# Patient Record
Sex: MALE | Race: WHITE | NOT HISPANIC OR LATINO | Employment: FULL TIME | URBAN - METROPOLITAN AREA
[De-identification: names, ages, dates, MRNs, and addresses within clinical notes are randomized per-mention and may not be internally consistent; named-entity substitution may affect disease eponyms.]

---

## 2018-03-29 ENCOUNTER — COMMUNICATION - HEALTHEAST (OUTPATIENT)
Dept: TELEHEALTH | Facility: CLINIC | Age: 48
End: 2018-03-29

## 2018-03-29 ENCOUNTER — COMMUNICATION - HEALTHEAST (OUTPATIENT)
Dept: FAMILY MEDICINE | Facility: CLINIC | Age: 48
End: 2018-03-29

## 2018-03-29 ENCOUNTER — OFFICE VISIT - HEALTHEAST (OUTPATIENT)
Dept: FAMILY MEDICINE | Facility: CLINIC | Age: 48
End: 2018-03-29

## 2018-03-29 DIAGNOSIS — Z00.00 ROUTINE GENERAL MEDICAL EXAMINATION AT A HEALTH CARE FACILITY: ICD-10-CM

## 2018-03-29 DIAGNOSIS — Z23 IMMUNIZATION DUE: ICD-10-CM

## 2018-03-29 DIAGNOSIS — L30.9 ECZEMA: ICD-10-CM

## 2018-03-29 DIAGNOSIS — E66.9 OBESITY: ICD-10-CM

## 2018-03-29 DIAGNOSIS — E11.9 TYPE 2 DIABETES MELLITUS WITHOUT COMPLICATION (H): ICD-10-CM

## 2018-03-29 DIAGNOSIS — I10 HYPERTENSION GOAL BP (BLOOD PRESSURE) < 140/90: ICD-10-CM

## 2018-03-29 DIAGNOSIS — F43.23 ADJUSTMENT DISORDER WITH MIXED ANXIETY AND DEPRESSED MOOD: ICD-10-CM

## 2018-03-29 DIAGNOSIS — E78.5 HYPERLIPIDEMIA: ICD-10-CM

## 2018-03-29 DIAGNOSIS — J20.9 ACUTE BRONCHITIS: ICD-10-CM

## 2018-03-29 LAB
ALBUMIN SERPL-MCNC: 3.9 G/DL (ref 3.5–5)
ALP SERPL-CCNC: 86 U/L (ref 45–120)
ALT SERPL W P-5'-P-CCNC: 65 U/L (ref 0–45)
ANION GAP SERPL CALCULATED.3IONS-SCNC: 11 MMOL/L (ref 5–18)
AST SERPL W P-5'-P-CCNC: 37 U/L (ref 0–40)
BILIRUB SERPL-MCNC: 0.7 MG/DL (ref 0–1)
BUN SERPL-MCNC: 14 MG/DL (ref 8–22)
CALCIUM SERPL-MCNC: 9.9 MG/DL (ref 8.5–10.5)
CHLORIDE BLD-SCNC: 102 MMOL/L (ref 98–107)
CO2 SERPL-SCNC: 25 MMOL/L (ref 22–31)
CREAT SERPL-MCNC: 0.69 MG/DL (ref 0.7–1.3)
CREAT UR-MCNC: 67.1 MG/DL
GFR SERPL CREATININE-BSD FRML MDRD: >60 ML/MIN/1.73M2
GLUCOSE BLD-MCNC: 112 MG/DL (ref 70–125)
HBA1C MFR BLD: 7 % (ref 3.5–6)
LDLC SERPL CALC-MCNC: 83 MG/DL
MICROALBUMIN UR-MCNC: 1.09 MG/DL (ref 0–1.99)
MICROALBUMIN/CREAT UR: 16.2 MG/G
POTASSIUM BLD-SCNC: 4.4 MMOL/L (ref 3.5–5)
PROT SERPL-MCNC: 8.3 G/DL (ref 6–8)
SODIUM SERPL-SCNC: 138 MMOL/L (ref 136–145)

## 2018-03-29 ASSESSMENT — MIFFLIN-ST. JEOR: SCORE: 1904.78

## 2018-04-27 ENCOUNTER — OFFICE VISIT - HEALTHEAST (OUTPATIENT)
Dept: BEHAVIORAL HEALTH | Facility: CLINIC | Age: 48
End: 2018-04-27

## 2018-04-27 DIAGNOSIS — F33.0 MILD EPISODE OF RECURRENT MAJOR DEPRESSIVE DISORDER (H): ICD-10-CM

## 2018-04-27 DIAGNOSIS — F10.20 MODERATE ALCOHOL USE DISORDER (H): ICD-10-CM

## 2018-04-27 DIAGNOSIS — F41.1 GENERALIZED ANXIETY DISORDER: ICD-10-CM

## 2018-04-27 DIAGNOSIS — Z63.4 BEREAVEMENT, UNCOMPLICATED: ICD-10-CM

## 2018-04-27 SDOH — SOCIAL STABILITY - SOCIAL INSECURITY: DISSAPEARANCE AND DEATH OF FAMILY MEMBER: Z63.4

## 2018-05-17 ENCOUNTER — AMBULATORY - HEALTHEAST (OUTPATIENT)
Dept: BEHAVIORAL HEALTH | Facility: CLINIC | Age: 48
End: 2018-05-17

## 2018-05-17 ENCOUNTER — OFFICE VISIT - HEALTHEAST (OUTPATIENT)
Dept: BEHAVIORAL HEALTH | Facility: CLINIC | Age: 48
End: 2018-05-17

## 2018-05-17 ENCOUNTER — COMMUNICATION - HEALTHEAST (OUTPATIENT)
Dept: BEHAVIORAL HEALTH | Facility: CLINIC | Age: 48
End: 2018-05-17

## 2018-05-17 DIAGNOSIS — F10.20 MODERATE ALCOHOL USE DISORDER (H): ICD-10-CM

## 2018-05-17 DIAGNOSIS — Z63.4 BEREAVEMENT, UNCOMPLICATED: ICD-10-CM

## 2018-05-17 DIAGNOSIS — F33.0 MILD EPISODE OF RECURRENT MAJOR DEPRESSIVE DISORDER (H): ICD-10-CM

## 2018-05-17 DIAGNOSIS — F41.1 GENERALIZED ANXIETY DISORDER: ICD-10-CM

## 2018-05-17 SDOH — SOCIAL STABILITY - SOCIAL INSECURITY: DISSAPEARANCE AND DEATH OF FAMILY MEMBER: Z63.4

## 2018-05-29 ENCOUNTER — AMBULATORY - HEALTHEAST (OUTPATIENT)
Dept: EDUCATION SERVICES | Facility: CLINIC | Age: 48
End: 2018-05-29

## 2018-05-29 DIAGNOSIS — E11.9 TYPE 2 DIABETES MELLITUS WITHOUT COMPLICATION (H): ICD-10-CM

## 2018-05-31 ENCOUNTER — OFFICE VISIT - HEALTHEAST (OUTPATIENT)
Dept: BEHAVIORAL HEALTH | Facility: CLINIC | Age: 48
End: 2018-05-31

## 2018-05-31 DIAGNOSIS — F41.1 GENERALIZED ANXIETY DISORDER: ICD-10-CM

## 2018-05-31 DIAGNOSIS — F33.0 MILD EPISODE OF RECURRENT MAJOR DEPRESSIVE DISORDER (H): ICD-10-CM

## 2018-05-31 DIAGNOSIS — Z63.4 BEREAVEMENT, UNCOMPLICATED: ICD-10-CM

## 2018-05-31 DIAGNOSIS — F10.20 MODERATE ALCOHOL USE DISORDER (H): ICD-10-CM

## 2018-05-31 SDOH — SOCIAL STABILITY - SOCIAL INSECURITY: DISSAPEARANCE AND DEATH OF FAMILY MEMBER: Z63.4

## 2018-06-18 ENCOUNTER — OFFICE VISIT - HEALTHEAST (OUTPATIENT)
Dept: BEHAVIORAL HEALTH | Facility: CLINIC | Age: 48
End: 2018-06-18

## 2018-06-18 DIAGNOSIS — Z63.4 BEREAVEMENT, UNCOMPLICATED: ICD-10-CM

## 2018-06-18 DIAGNOSIS — F10.20 MODERATE ALCOHOL USE DISORDER (H): ICD-10-CM

## 2018-06-18 DIAGNOSIS — F41.1 GENERALIZED ANXIETY DISORDER: ICD-10-CM

## 2018-06-18 DIAGNOSIS — F33.0 MILD EPISODE OF RECURRENT MAJOR DEPRESSIVE DISORDER (H): ICD-10-CM

## 2018-06-18 SDOH — SOCIAL STABILITY - SOCIAL INSECURITY: DISSAPEARANCE AND DEATH OF FAMILY MEMBER: Z63.4

## 2018-07-09 ENCOUNTER — OFFICE VISIT - HEALTHEAST (OUTPATIENT)
Dept: BEHAVIORAL HEALTH | Facility: CLINIC | Age: 48
End: 2018-07-09

## 2018-07-09 DIAGNOSIS — F33.0 MILD EPISODE OF RECURRENT MAJOR DEPRESSIVE DISORDER (H): ICD-10-CM

## 2018-07-09 DIAGNOSIS — F41.1 GENERALIZED ANXIETY DISORDER: ICD-10-CM

## 2018-07-23 ENCOUNTER — COMMUNICATION - HEALTHEAST (OUTPATIENT)
Dept: FAMILY MEDICINE | Facility: CLINIC | Age: 48
End: 2018-07-23

## 2018-07-23 ENCOUNTER — OFFICE VISIT - HEALTHEAST (OUTPATIENT)
Dept: BEHAVIORAL HEALTH | Facility: CLINIC | Age: 48
End: 2018-07-23

## 2018-07-23 DIAGNOSIS — F41.1 GENERALIZED ANXIETY DISORDER: ICD-10-CM

## 2018-07-23 DIAGNOSIS — F43.23 ADJUSTMENT DISORDER WITH MIXED ANXIETY AND DEPRESSED MOOD: ICD-10-CM

## 2018-07-23 DIAGNOSIS — I10 HYPERTENSION GOAL BP (BLOOD PRESSURE) < 140/90: ICD-10-CM

## 2018-07-23 DIAGNOSIS — E78.5 HYPERLIPIDEMIA: ICD-10-CM

## 2018-07-23 DIAGNOSIS — F33.0 MILD EPISODE OF RECURRENT MAJOR DEPRESSIVE DISORDER (H): ICD-10-CM

## 2018-08-01 ENCOUNTER — AMBULATORY - HEALTHEAST (OUTPATIENT)
Dept: EDUCATION SERVICES | Facility: CLINIC | Age: 48
End: 2018-08-01

## 2018-08-01 DIAGNOSIS — E08.65 DIABETES MELLITUS DUE TO UNDERLYING CONDITION WITH HYPERGLYCEMIA (H): ICD-10-CM

## 2018-08-01 LAB — HBA1C MFR BLD: 7.2 % (ref 3.5–6)

## 2018-08-14 ENCOUNTER — AMBULATORY - HEALTHEAST (OUTPATIENT)
Dept: BEHAVIORAL HEALTH | Facility: CLINIC | Age: 48
End: 2018-08-14

## 2018-08-14 ENCOUNTER — OFFICE VISIT - HEALTHEAST (OUTPATIENT)
Dept: BEHAVIORAL HEALTH | Facility: CLINIC | Age: 48
End: 2018-08-14

## 2018-08-14 DIAGNOSIS — F33.0 MILD EPISODE OF RECURRENT MAJOR DEPRESSIVE DISORDER (H): ICD-10-CM

## 2018-08-14 DIAGNOSIS — F41.1 GENERALIZED ANXIETY DISORDER: ICD-10-CM

## 2018-08-22 ENCOUNTER — COMMUNICATION - HEALTHEAST (OUTPATIENT)
Dept: BEHAVIORAL HEALTH | Facility: CLINIC | Age: 48
End: 2018-08-22

## 2019-04-02 ENCOUNTER — COMMUNICATION - HEALTHEAST (OUTPATIENT)
Dept: FAMILY MEDICINE | Facility: CLINIC | Age: 49
End: 2019-04-02

## 2019-04-02 ENCOUNTER — OFFICE VISIT - HEALTHEAST (OUTPATIENT)
Dept: FAMILY MEDICINE | Facility: CLINIC | Age: 49
End: 2019-04-02

## 2019-04-02 DIAGNOSIS — I10 HYPERTENSION GOAL BP (BLOOD PRESSURE) < 140/90: ICD-10-CM

## 2019-04-02 DIAGNOSIS — R05.9 COUGH: ICD-10-CM

## 2019-04-02 DIAGNOSIS — R07.0 THROAT PAIN: ICD-10-CM

## 2019-04-02 DIAGNOSIS — J18.9 PNEUMONIA OF LEFT LOWER LOBE DUE TO INFECTIOUS ORGANISM: ICD-10-CM

## 2019-04-02 LAB — DEPRECATED S PYO AG THROAT QL EIA: NORMAL

## 2019-04-03 LAB — GROUP A STREP BY PCR: NORMAL

## 2019-04-04 ENCOUNTER — OFFICE VISIT - HEALTHEAST (OUTPATIENT)
Dept: FAMILY MEDICINE | Facility: CLINIC | Age: 49
End: 2019-04-04

## 2019-04-04 DIAGNOSIS — I10 ESSENTIAL HYPERTENSION, BENIGN: ICD-10-CM

## 2019-04-04 DIAGNOSIS — E11.9 TYPE 2 DIABETES MELLITUS WITHOUT COMPLICATION, WITHOUT LONG-TERM CURRENT USE OF INSULIN (H): ICD-10-CM

## 2019-04-04 DIAGNOSIS — E78.00 PURE HYPERCHOLESTEROLEMIA: ICD-10-CM

## 2019-04-04 DIAGNOSIS — J18.9 PNEUMONIA OF LEFT LOWER LOBE DUE TO INFECTIOUS ORGANISM: ICD-10-CM

## 2019-04-04 LAB — HBA1C MFR BLD: 6.7 % (ref 3.5–6)

## 2019-04-05 ENCOUNTER — COMMUNICATION - HEALTHEAST (OUTPATIENT)
Dept: FAMILY MEDICINE | Facility: CLINIC | Age: 49
End: 2019-04-05

## 2019-04-05 LAB
ALBUMIN SERPL-MCNC: 3.7 G/DL (ref 3.5–5)
ALP SERPL-CCNC: 74 U/L (ref 45–120)
ALT SERPL W P-5'-P-CCNC: 49 U/L (ref 0–45)
ANION GAP SERPL CALCULATED.3IONS-SCNC: 10 MMOL/L (ref 5–18)
AST SERPL W P-5'-P-CCNC: 30 U/L (ref 0–40)
BILIRUB SERPL-MCNC: 0.4 MG/DL (ref 0–1)
BUN SERPL-MCNC: 14 MG/DL (ref 8–22)
CALCIUM SERPL-MCNC: 9.9 MG/DL (ref 8.5–10.5)
CHLORIDE BLD-SCNC: 100 MMOL/L (ref 98–107)
CO2 SERPL-SCNC: 28 MMOL/L (ref 22–31)
CREAT SERPL-MCNC: 0.78 MG/DL (ref 0.7–1.3)
GFR SERPL CREATININE-BSD FRML MDRD: >60 ML/MIN/1.73M2
GLUCOSE BLD-MCNC: 189 MG/DL (ref 70–125)
LDLC SERPL CALC-MCNC: 77 MG/DL
POTASSIUM BLD-SCNC: 4 MMOL/L (ref 3.5–5)
PROT SERPL-MCNC: 7.6 G/DL (ref 6–8)
SODIUM SERPL-SCNC: 138 MMOL/L (ref 136–145)

## 2019-04-17 ENCOUNTER — COMMUNICATION - HEALTHEAST (OUTPATIENT)
Dept: FAMILY MEDICINE | Facility: CLINIC | Age: 49
End: 2019-04-17

## 2019-04-17 DIAGNOSIS — I10 HYPERTENSION GOAL BP (BLOOD PRESSURE) < 140/90: ICD-10-CM

## 2019-04-17 DIAGNOSIS — E78.5 HYPERLIPIDEMIA: ICD-10-CM

## 2019-05-18 ENCOUNTER — COMMUNICATION - HEALTHEAST (OUTPATIENT)
Dept: FAMILY MEDICINE | Facility: CLINIC | Age: 49
End: 2019-05-18

## 2019-05-18 DIAGNOSIS — E11.9 TYPE 2 DIABETES MELLITUS WITHOUT COMPLICATION (H): ICD-10-CM

## 2019-07-02 ENCOUNTER — COMMUNICATION - HEALTHEAST (OUTPATIENT)
Dept: FAMILY MEDICINE | Facility: CLINIC | Age: 49
End: 2019-07-02

## 2019-07-02 DIAGNOSIS — I10 HYPERTENSION GOAL BP (BLOOD PRESSURE) < 140/90: ICD-10-CM

## 2019-10-16 ENCOUNTER — OFFICE VISIT - HEALTHEAST (OUTPATIENT)
Dept: FAMILY MEDICINE | Facility: CLINIC | Age: 49
End: 2019-10-16

## 2019-10-16 DIAGNOSIS — G44.009 VASOMOTOR HEADACHE: ICD-10-CM

## 2019-10-16 DIAGNOSIS — I10 ESSENTIAL HYPERTENSION, BENIGN: ICD-10-CM

## 2019-10-16 DIAGNOSIS — E11.9 TYPE 2 DIABETES MELLITUS WITHOUT COMPLICATION, WITHOUT LONG-TERM CURRENT USE OF INSULIN (H): ICD-10-CM

## 2019-10-16 DIAGNOSIS — Z00.00 ROUTINE GENERAL MEDICAL EXAMINATION AT A HEALTH CARE FACILITY: ICD-10-CM

## 2019-10-16 DIAGNOSIS — E78.00 PURE HYPERCHOLESTEROLEMIA: ICD-10-CM

## 2019-10-16 DIAGNOSIS — R94.5 ABNORMAL RESULTS OF LIVER FUNCTION STUDIES: ICD-10-CM

## 2019-10-16 DIAGNOSIS — F43.23 ADJUSTMENT DISORDER WITH MIXED ANXIETY AND DEPRESSED MOOD: ICD-10-CM

## 2019-10-16 DIAGNOSIS — F41.1 GENERALIZED ANXIETY DISORDER: ICD-10-CM

## 2019-10-16 LAB
ALT SERPL W P-5'-P-CCNC: 52 U/L (ref 0–45)
CREAT UR-MCNC: 113.6 MG/DL
HBA1C MFR BLD: 6.8 % (ref 3.5–6)
MICROALBUMIN UR-MCNC: 1.39 MG/DL (ref 0–1.99)
MICROALBUMIN/CREAT UR: 12.2 MG/G

## 2019-10-16 ASSESSMENT — MIFFLIN-ST. JEOR: SCORE: 1901.95

## 2019-10-16 ASSESSMENT — PATIENT HEALTH QUESTIONNAIRE - PHQ9: SUM OF ALL RESPONSES TO PHQ QUESTIONS 1-9: 6

## 2019-10-17 ENCOUNTER — COMMUNICATION - HEALTHEAST (OUTPATIENT)
Dept: FAMILY MEDICINE | Facility: CLINIC | Age: 49
End: 2019-10-17

## 2019-10-18 ENCOUNTER — AMBULATORY - HEALTHEAST (OUTPATIENT)
Dept: OTHER | Facility: CLINIC | Age: 49
End: 2019-10-18

## 2019-10-18 ENCOUNTER — DOCUMENTATION ONLY (OUTPATIENT)
Dept: OTHER | Facility: CLINIC | Age: 49
End: 2019-10-18

## 2019-10-24 ENCOUNTER — COMMUNICATION - HEALTHEAST (OUTPATIENT)
Dept: FAMILY MEDICINE | Facility: CLINIC | Age: 49
End: 2019-10-24

## 2019-11-06 ENCOUNTER — RECORDS - HEALTHEAST (OUTPATIENT)
Dept: ADMINISTRATIVE | Facility: OTHER | Age: 49
End: 2019-11-06

## 2019-11-07 ENCOUNTER — COMMUNICATION - HEALTHEAST (OUTPATIENT)
Dept: FAMILY MEDICINE | Facility: CLINIC | Age: 49
End: 2019-11-07

## 2019-11-07 DIAGNOSIS — G44.009 VASOMOTOR HEADACHE: ICD-10-CM

## 2019-11-11 ENCOUNTER — RECORDS - HEALTHEAST (OUTPATIENT)
Dept: HEALTH INFORMATION MANAGEMENT | Facility: CLINIC | Age: 49
End: 2019-11-11

## 2020-03-13 ENCOUNTER — VIRTUAL VISIT (OUTPATIENT)
Dept: FAMILY MEDICINE | Facility: OTHER | Age: 50
End: 2020-03-13

## 2020-03-15 ENCOUNTER — OFFICE VISIT - HEALTHEAST (OUTPATIENT)
Dept: FAMILY MEDICINE | Facility: CLINIC | Age: 50
End: 2020-03-15

## 2020-03-15 DIAGNOSIS — J02.9 SORE THROAT: ICD-10-CM

## 2020-03-15 LAB — DEPRECATED S PYO AG THROAT QL EIA: NORMAL

## 2020-03-16 LAB — GROUP A STREP BY PCR: NORMAL

## 2020-03-18 NOTE — PROGRESS NOTES
"Date: 2020 10:17:57  Clinician: Grabiel Carey  Clinician NPI: [id number]  Patient: Grant Slaughter  Patient : 1970  Patient Address: [id number], [place] [id number]  Patient Phone: [phone]  Visit Protocol: URI  Patient Summary:  Grant is a 49 year old ( : 1970 ) male who initiated a Visit for cold, sinus infection, or influenza. When asked the question \"Please sign me up to receive news, health information and promotions. \", Grant responded \"No\".    Grant states his symptoms started gradually 7-9 days ago. After his symptoms started, they improved and then got worse again.   His symptoms consist of chills, wheezing, a sore throat, a cough, nasal congestion, malaise, rhinitis, and myalgia. He is experiencing mild difficulty breathing with activities but can speak normally in full sentences. Grant also feels feverish but was unable to measure his temperature.   Symptom details     Nasal secretions: The color of his mucus is green, clear, yellow, white, and blood-tinged.    Cough: Grant coughs almost every minute and his cough is not more bothersome at night. Phlegm comes into his throat when he coughs. He does not believe his cough is caused by post-nasal drip. The color of the phlegm is white and clear.     Sore throat: Grant reports having severe throat pain (7-9 on a 10 point pain scale), has exudate on his tonsils, and can swallow liquids. He is not sure if the lymph nodes in his neck are enlarged. A rash has not appeared on the skin since the sore throat started.     Wheezing: Grant has not ever been diagnosed with asthma. The wheezing interferes with his normal daily activities.     Grant denies having teeth pain, ear pain, headache, and facial pain or pressure. He also denies taking antibiotic medication for the symptoms, having recent facial or sinus surgery in the past 60 days, and having a sinus infection within the past year.   Precipitating events  Within the past week, Grant " has not been exposed to someone with strep throat. He has not recently been exposed to someone with influenza. Grant has been in close contact with the following high risk individuals: people with asthma, heart disease or diabetes.   Pertinent COVID-19 (Coronavirus) information  Grant has not traveled internationally or to the areas where COVID-19 (Coronavirus) is widespread in the last 14 days before the start of his symptoms.   Grant has not had close contact with a laboratory-confirmed positive COVID-19 patient or someone under quarantine for suspected COVID-19 within 14 days of symptom onset.   Grant is not a healthcare worker or does not work in a healthcare facility.   Triage Point(s) temporarily suspended for COVID-19 (Coronavirus) screening  Grant reported the following symptoms which were previously protocol referral points. These protocol referral points have temporarily been removed for purposes of COVID-19 (Coronavirus) screening.   Wheezing that keeps Grant from doing daily activities   Pertinent medical history  Grant needs a return to work/school note.   Weight: 225 lbs   Grant does not smoke or use smokeless tobacco.   Weight: 225 lbs    MEDICATIONS: citalopram oral, atorvastatin oral, amlodipine-benazepril oral, metformin oral, amiloride-hydrochlorothiazide oral, losartan oral, ALLERGIES: Allegra  Clinician Response:  Dear Grant,   Dear Grant,  Based on the information you have provided, it does not appear you need Coronavirus (COVID-19) testing. Unfortunately, based on your information we are also not able to provide a diagnosis. We feel an in-person visit with a provider is more appropriate for your condition based on your wheezing and worsening symptoms. We'd be honored to see you in one of our clinics or urgent cares. Please call [phone] to schedule an appointment.  We request that you bring documentation of your completed [facility] visit to your clinic appointment. Failure to do so may result in a  request to repeat your [facility] visit. Documentation could include a printout from your visit or show the  the completed visit on your phone.  Why no testing?  At this time, we recommend testing primarily for those people who have typical symptoms of cough and fever and have either traveled to a known high risk area of infection or who have been exposed to someone with Coronavirus by close contact.   For more information about COVID19 and options for caring for yourself at home, please visit the CDC website at https://www.cdc.gov/coronavirus/2019-ncov/about/steps-when-sick.html   For more options for care at [facility], please visit our website at [email or web address]     COVID-19 (Coronavirus) General Information  With the increase in the number of COVID-19 (Coronavirus) cases, we understand you may have some questions. Below is some helpful information on COVID-19 (Coronavirus).  How can I protect myself and others from the COVID-19 (Coronavirus)?  Because there is currently no vaccine to prevent infection, the best way to protect yourself is to avoid being exposed to this virus. Put distance between yourself and other people if COVID-19 (Coronavirus) is spreading in your community. The virus is thought to spread mainly from person-to-person.     Between people who are in close contact with one another (within about 6 about) for prolonged period (10 minutes or longer).    Through respiratory droplets produced when an infected person coughs or sneezes.     The CDC recommends the following additional steps to protect yourself and others:     Wash your hands often with soap and water for at least 20 seconds, especially after blowing your nose, coughing, or sneezing; going to the bathroom; and before eating or preparing food.  Use an alcohol-based hand  that contains at least 60 percent alcohol if soap and water are not available.        Avoid touching your eyes,  nose and mouth with unwashed hands.    Avoid close contact with people who are sick.    Stay home when you are sick.    Cover your cough or sneeze with a tissue, then throw the tissue in the trash.    Clean and disinfect frequently touched objects and surfaces.     You can help stop COVID-19 (Coronavirus) by knowing the signs and symptoms:     Fever    Cough    Shortness of breath     Contact your healthcare provider if   Develop symptoms   AND   Have been in close contact with a person known to have COVID-19 (Coronavirus) or live in or have recently traveled from an area with ongoing spread of COVID-19 (Coronavirus). Call ahead before you go to a doctor's office or emergency room. Tell them about your recent travel and your symptoms.   For the most up to date information, visit the CDC's website.  Steps to help prevent the spread of COVID-19 (Coronavirus) if you are sick  If you are sick with COVID-19 (Coronavirus) or suspect you are infected with the virus that causes COVID-19 (Coronavirus), follow the steps below to help prevent the disease from spreading&nbsp;to people in your home and community.     Stay home except to get medical care. Home isolation may be started in consultation with your healthcare clinician.    Separate yourself from other people and animals in your home.    Call ahead before visiting your doctor if you have a medical appointment.    Wear a facemask when you are around other people.    Cover your cough and sneezes.    Clean your hands often.    Avoid sharing personal household items.    Clean and disinfect frequently touched objects and surfaces everyday.    You will need to have someone drop off medications or household supplies (if needed) at your house without coming inside or in contact with you or others living in your house.    Monitor your symptoms and seek prompt medical care if your illness is worsening (e.g. Difficulty breathing).    Discontinue home isolation only in  "consultation with your healthcare provider.     For more detailed and up to date information on what to do if you are sick, visit this link: What to Do If You Are Sick With Coronavirus Disease 2019 (COVID-19).  Do I need to be tested for COVID-19 (Coronavirus)?     At this time, the limited number of tests available are controlled by the state and local health departments and are being reserved for more seriously ill patients, those with known exposure to confirmed patients, and those with recent travel (within 14 days) to countries with high rates of COVID-19 (Coronavirus).    Decisions on which patients receive testing will be based on the local spread of COVID-19 (Coronavirus) as well as the symptoms. Your healthcare provider will make the final decision on whether you should be tested.    In the meantime, if you have concerns that you may have been exposed, it is reasonable to practice \"social distancing.\"&nbsp; If you are ill with a cold or flu-like illness, please monitor your symptoms and reach out to your healthcare provider if your symptoms worsen.    For more up to date information, visit this link: COVID-19 (Coronavirus) Frequently Asked Questions and Answers.      Diagnosis: Cough  Diagnosis ICD: R05  "

## 2020-03-27 ENCOUNTER — COMMUNICATION - HEALTHEAST (OUTPATIENT)
Dept: FAMILY MEDICINE | Facility: CLINIC | Age: 50
End: 2020-03-27

## 2020-03-27 DIAGNOSIS — I10 HYPERTENSION GOAL BP (BLOOD PRESSURE) < 140/90: ICD-10-CM

## 2020-04-06 ENCOUNTER — OFFICE VISIT - HEALTHEAST (OUTPATIENT)
Dept: FAMILY MEDICINE | Facility: CLINIC | Age: 50
End: 2020-04-06

## 2020-04-06 DIAGNOSIS — E11.9 TYPE 2 DIABETES MELLITUS WITHOUT COMPLICATION (H): ICD-10-CM

## 2020-04-06 DIAGNOSIS — F43.23 ADJUSTMENT DISORDER WITH MIXED ANXIETY AND DEPRESSED MOOD: ICD-10-CM

## 2020-04-06 DIAGNOSIS — R74.8 ABNORMAL LIVER ENZYMES: ICD-10-CM

## 2020-04-06 DIAGNOSIS — F10.20 MODERATE ALCOHOL USE DISORDER (H): ICD-10-CM

## 2020-04-06 DIAGNOSIS — E78.00 PURE HYPERCHOLESTEROLEMIA: ICD-10-CM

## 2020-04-06 DIAGNOSIS — E78.5 HYPERLIPIDEMIA: ICD-10-CM

## 2020-04-06 DIAGNOSIS — E11.9 TYPE 2 DIABETES MELLITUS WITHOUT COMPLICATION, WITHOUT LONG-TERM CURRENT USE OF INSULIN (H): ICD-10-CM

## 2020-04-06 DIAGNOSIS — I10 HYPERTENSION GOAL BP (BLOOD PRESSURE) < 140/90: ICD-10-CM

## 2020-04-06 RX ORDER — LOSARTAN POTASSIUM 100 MG/1
100 TABLET ORAL DAILY
Qty: 90 TABLET | Refills: 3 | Status: SHIPPED | OUTPATIENT
Start: 2020-04-06 | End: 2022-05-03

## 2020-04-06 RX ORDER — HYDROCHLOROTHIAZIDE 25 MG/1
25 TABLET ORAL DAILY
Qty: 90 TABLET | Refills: 3 | Status: SHIPPED | OUTPATIENT
Start: 2020-04-06 | End: 2024-04-30

## 2020-04-07 ENCOUNTER — AMBULATORY - HEALTHEAST (OUTPATIENT)
Dept: LAB | Facility: CLINIC | Age: 50
End: 2020-04-07

## 2020-04-07 DIAGNOSIS — F10.20 MODERATE ALCOHOL USE DISORDER (H): ICD-10-CM

## 2020-04-07 DIAGNOSIS — R74.8 ABNORMAL LIVER ENZYMES: ICD-10-CM

## 2020-04-07 DIAGNOSIS — E78.00 PURE HYPERCHOLESTEROLEMIA: ICD-10-CM

## 2020-04-07 DIAGNOSIS — E11.9 TYPE 2 DIABETES MELLITUS WITHOUT COMPLICATION, WITHOUT LONG-TERM CURRENT USE OF INSULIN (H): ICD-10-CM

## 2020-04-07 LAB
ALBUMIN SERPL-MCNC: 3.8 G/DL (ref 3.5–5)
ALP SERPL-CCNC: 76 U/L (ref 45–120)
ALT SERPL W P-5'-P-CCNC: 39 U/L (ref 0–45)
ANION GAP SERPL CALCULATED.3IONS-SCNC: 12 MMOL/L (ref 5–18)
AST SERPL W P-5'-P-CCNC: 23 U/L (ref 0–40)
BILIRUB SERPL-MCNC: 0.3 MG/DL (ref 0–1)
BUN SERPL-MCNC: 16 MG/DL (ref 8–22)
CALCIUM SERPL-MCNC: 9.4 MG/DL (ref 8.5–10.5)
CHLORIDE BLD-SCNC: 103 MMOL/L (ref 98–107)
CO2 SERPL-SCNC: 27 MMOL/L (ref 22–31)
CREAT SERPL-MCNC: 0.78 MG/DL (ref 0.7–1.3)
GFR SERPL CREATININE-BSD FRML MDRD: >60 ML/MIN/1.73M2
GLUCOSE BLD-MCNC: 179 MG/DL (ref 70–125)
HBA1C MFR BLD: 7 % (ref 3.5–6)
LDLC SERPL CALC-MCNC: 99 MG/DL
POTASSIUM BLD-SCNC: 4.1 MMOL/L (ref 3.5–5)
PROT SERPL-MCNC: 7.5 G/DL (ref 6–8)
SODIUM SERPL-SCNC: 142 MMOL/L (ref 136–145)

## 2020-04-08 ENCOUNTER — COMMUNICATION - HEALTHEAST (OUTPATIENT)
Dept: FAMILY MEDICINE | Facility: CLINIC | Age: 50
End: 2020-04-08

## 2021-04-16 ENCOUNTER — OFFICE VISIT - HEALTHEAST (OUTPATIENT)
Dept: FAMILY MEDICINE | Facility: CLINIC | Age: 51
End: 2021-04-16

## 2021-04-16 DIAGNOSIS — I10 ESSENTIAL HYPERTENSION, BENIGN: ICD-10-CM

## 2021-04-16 DIAGNOSIS — J31.0 CHRONIC RHINITIS: ICD-10-CM

## 2021-04-16 DIAGNOSIS — F33.0 MILD EPISODE OF RECURRENT MAJOR DEPRESSIVE DISORDER (H): ICD-10-CM

## 2021-04-16 DIAGNOSIS — E78.00 PURE HYPERCHOLESTEROLEMIA: ICD-10-CM

## 2021-04-16 DIAGNOSIS — Z00.00 HEALTHCARE MAINTENANCE: ICD-10-CM

## 2021-04-16 DIAGNOSIS — E11.9 TYPE 2 DIABETES MELLITUS WITHOUT COMPLICATION, WITHOUT LONG-TERM CURRENT USE OF INSULIN (H): ICD-10-CM

## 2021-04-16 DIAGNOSIS — F10.20 MODERATE ALCOHOL USE DISORDER (H): ICD-10-CM

## 2021-04-16 LAB
ALBUMIN SERPL-MCNC: 4.1 G/DL (ref 3.5–5)
ALP SERPL-CCNC: 77 U/L (ref 45–120)
ALT SERPL W P-5'-P-CCNC: 59 U/L (ref 0–45)
ANION GAP SERPL CALCULATED.3IONS-SCNC: 11 MMOL/L (ref 5–18)
AST SERPL W P-5'-P-CCNC: 37 U/L (ref 0–40)
BILIRUB SERPL-MCNC: 0.6 MG/DL (ref 0–1)
BUN SERPL-MCNC: 8 MG/DL (ref 8–22)
CALCIUM SERPL-MCNC: 9.3 MG/DL (ref 8.5–10.5)
CHLORIDE BLD-SCNC: 99 MMOL/L (ref 98–107)
CO2 SERPL-SCNC: 28 MMOL/L (ref 22–31)
CREAT SERPL-MCNC: 0.74 MG/DL (ref 0.7–1.3)
CREAT UR-MCNC: 78.5 MG/DL
ERYTHROCYTE [DISTWIDTH] IN BLOOD BY AUTOMATED COUNT: 12.6 % (ref 11–14.5)
GFR SERPL CREATININE-BSD FRML MDRD: >60 ML/MIN/1.73M2
GLUCOSE BLD-MCNC: 116 MG/DL (ref 70–125)
HBA1C MFR BLD: 7.1 %
HCT VFR BLD AUTO: 45.8 % (ref 40–54)
HGB BLD-MCNC: 15.5 G/DL (ref 14–18)
HIV 1+2 AB+HIV1 P24 AG SERPL QL IA: NEGATIVE
MCH RBC QN AUTO: 30.5 PG (ref 27–34)
MCHC RBC AUTO-ENTMCNC: 33.8 G/DL (ref 32–36)
MCV RBC AUTO: 90 FL (ref 80–100)
MICROALBUMIN UR-MCNC: 2.36 MG/DL (ref 0–1.99)
MICROALBUMIN/CREAT UR: 30.1 MG/G
PLATELET # BLD AUTO: 254 THOU/UL (ref 140–440)
PMV BLD AUTO: 9 FL (ref 7–10)
POTASSIUM BLD-SCNC: 3.8 MMOL/L (ref 3.5–5)
PROT SERPL-MCNC: 7.7 G/DL (ref 6–8)
RBC # BLD AUTO: 5.09 MILL/UL (ref 4.4–6.2)
SODIUM SERPL-SCNC: 138 MMOL/L (ref 136–145)
WBC: 6.6 THOU/UL (ref 4–11)

## 2021-04-16 ASSESSMENT — MIFFLIN-ST. JEOR: SCORE: 1887.78

## 2021-04-16 NOTE — ASSESSMENT & PLAN NOTE
A1c 7.1, controlled on Metformin extended release 500 twice daily.  Referral to ophthalmology.  Also on losartan, atorvastatin 20.

## 2021-04-16 NOTE — ASSESSMENT & PLAN NOTE
Patient indicates that he drinks heavily at times, mostly in social situations and not at other times.  Does not think he has a problem with this.  Discussed healthy drinking offered help.  Patient declined

## 2021-04-16 NOTE — ASSESSMENT & PLAN NOTE
Borderline blood pressure today on losartan 50, amlodipine 5 and hydrochlorothiazide 25.  Check BMP.  No changes for now, follow-up 6months

## 2021-04-16 NOTE — ASSESSMENT & PLAN NOTE
Patient has believe this is been allergies.  Has some sneezing and clear discharge but a lot of pressure.  We will treat for nonspecific specific rhinitis with fluticasone.

## 2021-04-19 ENCOUNTER — COMMUNICATION - HEALTHEAST (OUTPATIENT)
Dept: FAMILY MEDICINE | Facility: CLINIC | Age: 51
End: 2021-04-19

## 2021-04-19 DIAGNOSIS — E78.5 HYPERLIPIDEMIA: ICD-10-CM

## 2021-04-19 DIAGNOSIS — F43.23 ADJUSTMENT DISORDER WITH MIXED ANXIETY AND DEPRESSED MOOD: ICD-10-CM

## 2021-04-19 DIAGNOSIS — I10 HYPERTENSION GOAL BP (BLOOD PRESSURE) < 140/90: ICD-10-CM

## 2021-04-19 LAB — HCV AB SERPL QL IA: NEGATIVE

## 2021-04-20 ENCOUNTER — AMBULATORY - HEALTHEAST (OUTPATIENT)
Dept: NURSING | Facility: CLINIC | Age: 51
End: 2021-04-20

## 2021-04-20 ENCOUNTER — COMMUNICATION - HEALTHEAST (OUTPATIENT)
Dept: FAMILY MEDICINE | Facility: CLINIC | Age: 51
End: 2021-04-20

## 2021-04-20 RX ORDER — AMLODIPINE BESYLATE 5 MG/1
TABLET ORAL
Qty: 90 TABLET | Refills: 3 | Status: SHIPPED | OUTPATIENT
Start: 2021-04-20 | End: 2022-04-11

## 2021-04-20 RX ORDER — CITALOPRAM HYDROBROMIDE 20 MG/1
TABLET ORAL
Qty: 90 TABLET | Refills: 3 | Status: SHIPPED | OUTPATIENT
Start: 2021-04-20 | End: 2022-04-11

## 2021-04-20 RX ORDER — ATORVASTATIN CALCIUM 20 MG/1
TABLET, FILM COATED ORAL
Qty: 90 TABLET | Refills: 3 | Status: SHIPPED | OUTPATIENT
Start: 2021-04-20 | End: 2022-04-11

## 2021-04-28 ENCOUNTER — RECORDS - HEALTHEAST (OUTPATIENT)
Dept: ADMINISTRATIVE | Facility: OTHER | Age: 51
End: 2021-04-28

## 2021-04-28 LAB — RETINOPATHY: NEGATIVE

## 2021-05-03 ENCOUNTER — RECORDS - HEALTHEAST (OUTPATIENT)
Dept: HEALTH INFORMATION MANAGEMENT | Facility: CLINIC | Age: 51
End: 2021-05-03

## 2021-05-11 ENCOUNTER — AMBULATORY - HEALTHEAST (OUTPATIENT)
Dept: NURSING | Facility: CLINIC | Age: 51
End: 2021-05-11

## 2021-05-26 ASSESSMENT — PATIENT HEALTH QUESTIONNAIRE - PHQ9: SUM OF ALL RESPONSES TO PHQ QUESTIONS 1-9: 6

## 2021-05-27 ENCOUNTER — RECORDS - HEALTHEAST (OUTPATIENT)
Dept: ADMINISTRATIVE | Facility: CLINIC | Age: 51
End: 2021-05-27

## 2021-05-27 NOTE — PROGRESS NOTES
Cough last week.  waxes and wanes   Stayed home   Walk in clinic    Scan   Dx pneumonia  3:48    Now is feeling better.   Cough was bad today at work.  On antibiotic 48 hours.  A little weak.   Will go lay on couch after today.    No shortness of breath  No fever    Works at [employer].   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    Missed three days.  This week Mon - Wed    EXAM DATE:         04/02/2019     EXAM: X-RAY CHEST, 2 VIEWS, FRONTAL AND LATERAL  LOCATION: [facility]  DATE/TIME: 4/2/2019 3:30 PM     INDICATION: cough with chills  COMPARISON: None.     FINDINGS: There appears to be somewhat nodular infiltrate of the base of the  left lung concerning for pneumonia. Heart size and pulmonary vascularity are  normal. No pleural effusion or pneumothorax.     NOTE: ABNORMAL REPORT     THE DICTATION ABOVE DESCRIBES AN ABNORMALITY FOR WHICH FOLLOW-UP IS NEEDED.      Non smoker  No known prior lung disease      Diabetes denies polyuria.  Hyperlipidemia on statin denies muscle pain  Hypertension denies chest pain or headache.  Adjustment disorder with depressed mood denies suicidal ideation    No longer seeing therapist         OBJECTIVE:   Vitals:    04/04/19 1540   BP: 130/70   Pulse: 82   Resp: 20   Temp: 97.2  F (36.2  C)   SpO2: 95%      Wt is noted.  No diaphoresis  Eyes: nl eom, anicteric   External ears, nose: nl    Neck: nl nodes, supple, thyroid normal   Lungs: clear to ausc   Heart: regular rhythm  Abd: soft nontender     No cva (renal) tenderness  Neuro: no weakness  Skin no rash  Joints: uninflamed   No ketotic breath odor noted  Mental: euthymic  Ext: nontender calves   Gait: normal    Body mass index is 32.11 kg/m .  ASSESSMENT/PLAN:    1. Type 2 diabetes mellitus without complication, without long-term current use of insulin (H)  Glycosylated Hemoglobin A1c   2. Essential hypertension, benign  Comprehensive Metabolic Panel   3. Pure  hypercholesterolemia  LDL Cholesterol, Direct   4. Pneumonia of left lower lobe due to infectious organism (H)       3 wks follow up for film  Chronic issues stable/ same treatment.

## 2021-05-27 NOTE — PROGRESS NOTES
Chief Complaint   Patient presents with     Cough     x1wk, ST, weakness         HPI:    A week of dry cough with chills, and throat pain, fatigue. Minimal nasal congestion. No objective fever, chest pain, shortness of breath.      ROS: Pertinent ROS noted in HPI.     Allergies   Allergen Reactions     No Known Drug Allergies        Patient Active Problem List   Diagnosis     Obesity     ACP Staging Stage 2 Hypertension: Greater Than Or = 160/100     Eczema     Acanthosis Nigricans     Generalized anxiety disorder     Mild episode of recurrent major depressive disorder (H)     Moderate alcohol use disorder (H)     Bereavement, uncomplicated       No family history on file.    Social History     Socioeconomic History     Marital status: Single     Spouse name: Not on file     Number of children: Not on file     Years of education: Not on file     Highest education level: Not on file   Occupational History     Not on file   Social Needs     Financial resource strain: Not on file     Food insecurity:     Worry: Not on file     Inability: Not on file     Transportation needs:     Medical: Not on file     Non-medical: Not on file   Tobacco Use     Smoking status: Never Smoker     Smokeless tobacco: Never Used   Substance and Sexual Activity     Alcohol use: Not on file     Drug use: Not on file     Sexual activity: Not on file   Lifestyle     Physical activity:     Days per week: Not on file     Minutes per session: Not on file     Stress: Not on file   Relationships     Social connections:     Talks on phone: Not on file     Gets together: Not on file     Attends Mandaen service: Not on file     Active member of club or organization: Not on file     Attends meetings of clubs or organizations: Not on file     Relationship status: Not on file     Intimate partner violence:     Fear of current or ex partner: Not on file     Emotionally abused: Not on file     Physically abused: Not on file     Forced sexual activity: Not  on file   Other Topics Concern     Not on file   Social History Narrative     Not on file     Objective:    Vitals:    04/02/19 1509   BP: 127/82   Pulse: 87   Resp: 16   Temp: 98.3  F (36.8  C)   SpO2: 95%       Gen:NAD  Throat: oropharynx clear, tonsils normal  Ears: TMs clear without effusion, ear canals normal with small cerumen  Nose: no discharge  Neck: No adenopathy  CV: RRR, normal S1S2, no M, R, G  Pulm: CTAB, normal effort    Recent Results (from the past 24 hour(s))   Rapid Strep A Screen-Throat swab   Result Value Ref Range    Rapid Strep A Antigen No Group A Strep detected, presumptive negative No Group A Strep detected, presumptive negative     CXR  - ordered and reviewed by me, noted left lung base infiltrates c/w pneumonia, discussed during visit.   Xr Chest 2 Views    Result Date: 4/2/2019  EXAM DATE:         04/02/2019 EXAM: X-RAY CHEST, 2 VIEWS, FRONTAL AND LATERAL LOCATION: [facility] DATE/TIME: 4/2/2019 3:30 PM INDICATION: cough with chills COMPARISON: None. FINDINGS: There appears to be somewhat nodular infiltrate of the base of the left lung concerning for pneumonia. Heart size and pulmonary vascularity are normal. No pleural effusion or pneumothorax. NOTE: ABNORMAL REPORT THE DICTATION ABOVE DESCRIBES AN ABNORMALITY FOR WHICH FOLLOW-UP IS NEEDED.         Pneumonia of left lower lobe due to infectious organism (H)  -     doxycycline (VIBRA-TABS) 100 MG tablet; Take 1 tablet (100 mg total) by mouth 2 (two) times a day for 7 days.    Cough  -     XR Chest 2 Views  -     benzonatate (TESSALON) 200 MG capsule; Take 1 capsule (200 mg total) by mouth 3 (three) times a day as needed for cough.    Throat pain  -     Rapid Strep A Screen-Throat swab  -     Group A Strep, RNA Direct Detection, Throat    F/u as directed.

## 2021-05-27 NOTE — TELEPHONE ENCOUNTER
RN cannot approve Refill Request    RN can NOT refill this medication PCP messaged that patient is overdue for Labs and Office Visit.       Allison Celestin, [facility] Triage/Med Refill 4/3/2019    Requested Prescriptions   Pending Prescriptions Disp Refills     losartan (COZAAR) 100 MG tablet [Pharmacy Med Name: LOSARTAN POTASSIUM 100 MG TAB] 90 tablet 2     Sig: TAKE 1 TABLET (100 MG TOTAL) BY MOUTH DAILY.    Angiotensin Receptor Blocker Protocol Failed - 4/2/2019  1:07 AM       Failed - PCP or prescribing provider visit in past 12 months      Last office visit with prescriber/PCP: Visit date not found OR same dept: Visit date not found OR same specialty: Visit date not found  Last physical: 3/29/2018 Last MTM visit: Visit date not found   Next visit within 3 mo: Visit date not found  Next physical within 3 mo: Visit date not found  Prescriber OR PCP: Martinez Carey MD  Last diagnosis associated with med order: 1. Hypertension goal BP (blood pressure) < 140/90  - losartan (COZAAR) 100 MG tablet [Pharmacy Med Name: LOSARTAN POTASSIUM 100 MG TAB]; Take 1 tablet (100 mg total) by mouth daily.  Dispense: 90 tablet; Refill: 2  - hydroCHLOROthiazide (HYDRODIURIL) 25 MG tablet [Pharmacy Med Name: HYDROCHLOROTHIAZIDE 25 MG TAB]; Take 1 tablet (25 mg total) by mouth daily.  Dispense: 90 tablet; Refill: 2    If protocol passes may refill for 12 months if within 3 months of last provider visit (or a total of 15 months).            Failed - Serum potassium within the past 12 months    No results found for: LN-POTASSIUM         Failed - Blood pressure filed in past 12 months    BP Readings from Last 1 Encounters:   04/02/19 127/82            Failed - Serum creatinine within the past 12 months    Creatinine   Date Value Ref Range Status   03/29/2018 0.69 (L) 0.70 - 1.30 mg/dL Final             hydroCHLOROthiazide (HYDRODIURIL) 25 MG tablet [Pharmacy Med Name: HYDROCHLOROTHIAZIDE 25 MG TAB] 90 tablet 2     Sig: TAKE 1 TABLET (25  MG TOTAL) BY MOUTH DAILY.    Diuretics/Combination Diuretics Refill Protocol  Failed - 4/2/2019  1:07 AM       Failed - Visit with PCP or prescribing provider visit in past 12 months    Last office visit with prescriber/PCP: Visit date not found OR same dept: Visit date not found OR same specialty: Visit date not found  Last physical: 3/29/2018 Last MTM visit: Visit date not found   Next visit within 3 mo: Visit date not found  Next physical within 3 mo: Visit date not found  Prescriber OR PCP: Martinez Carey MD  Last diagnosis associated with med order: 1. Hypertension goal BP (blood pressure) < 140/90  - losartan (COZAAR) 100 MG tablet [Pharmacy Med Name: LOSARTAN POTASSIUM 100 MG TAB]; Take 1 tablet (100 mg total) by mouth daily.  Dispense: 90 tablet; Refill: 2  - hydroCHLOROthiazide (HYDRODIURIL) 25 MG tablet [Pharmacy Med Name: HYDROCHLOROTHIAZIDE 25 MG TAB]; Take 1 tablet (25 mg total) by mouth daily.  Dispense: 90 tablet; Refill: 2    If protocol passes may refill for 12 months if within 3 months of last provider visit (or a total of 15 months).            Failed - Serum Potassium in past 12 months     No results found for: LN-POTASSIUM         Failed - Serum Sodium in past 12 months     No results found for: LN-SODIUM         Failed - Blood pressure on file in past 12 months    BP Readings from Last 1 Encounters:   04/02/19 127/82            Failed - Serum Creatinine in past 12 months     Creatinine   Date Value Ref Range Status   03/29/2018 0.69 (L) 0.70 - 1.30 mg/dL Final

## 2021-05-28 NOTE — TELEPHONE ENCOUNTER
Refill Approved    Rx renewed per Medication Renewal Policy. Medication was last renewed on 3/29/18.    Allison Celestin, [facility] Triage/Med Refill 4/18/2019     Requested Prescriptions   Pending Prescriptions Disp Refills     amLODIPine (NORVASC) 5 MG tablet [Pharmacy Med Name: AMLODIPINE BESYLATE 5 MG TAB] 90 tablet 3     Sig: TAKE 1 TABLET (5 MG TOTAL) BY MOUTH DAILY.       Calcium-Channel Blockers Protocol Passed - 4/17/2019  1:28 AM        Passed - PCP or prescribing provider visit in past 12 months or next 3 months     Last office visit with prescriber/PCP: 4/4/2019 Martinez Carey MD OR same dept: 4/4/2019 Martinez Carey MD OR same specialty: 4/4/2019 Martinez Carey MD  Last physical: 3/29/2018 Last MTM visit: Visit date not found   Next visit within 3 mo: Visit date not found  Next physical within 3 mo: Visit date not found  Prescriber OR PCP: Martinez Carey MD  Last diagnosis associated with med order: 1. Hypertension goal BP (blood pressure) < 140/90  - amLODIPine (NORVASC) 5 MG tablet [Pharmacy Med Name: AMLODIPINE BESYLATE 5 MG TAB]; Take 1 tablet (5 mg total) by mouth daily.  Dispense: 90 tablet; Refill: 3    2. Hyperlipidemia  - atorvastatin (LIPITOR) 20 MG tablet [Pharmacy Med Name: ATORVASTATIN 20 MG TABLET]; Take 1 tablet (20 mg total) by mouth daily.  Dispense: 90 tablet; Refill: 3    If protocol passes may refill for 12 months if within 3 months of last provider visit (or a total of 15 months).             Passed - Blood pressure filed in past 12 months     BP Readings from Last 1 Encounters:   04/04/19 130/70             atorvastatin (LIPITOR) 20 MG tablet [Pharmacy Med Name: ATORVASTATIN 20 MG TABLET] 90 tablet 3     Sig: TAKE 1 TABLET (20 MG TOTAL) BY MOUTH DAILY.       Statins Refill Protocol (Hmg CoA Reductase Inhibitors) Passed - 4/17/2019  1:28 AM        Passed - PCP or prescribing provider visit in past 12 months      Last office visit with prescriber/PCP: 4/4/2019 Martinez Carey MD OR same dept:  4/4/2019 Martinez Carey MD OR same specialty: 4/4/2019 Martinez Carey MD  Last physical: 3/29/2018 Last MTM visit: Visit date not found   Next visit within 3 mo: Visit date not found  Next physical within 3 mo: Visit date not found  Prescriber OR PCP: Martinez Carey MD  Last diagnosis associated with med order: 1. Hypertension goal BP (blood pressure) < 140/90  - amLODIPine (NORVASC) 5 MG tablet [Pharmacy Med Name: AMLODIPINE BESYLATE 5 MG TAB]; Take 1 tablet (5 mg total) by mouth daily.  Dispense: 90 tablet; Refill: 3    2. Hyperlipidemia  - atorvastatin (LIPITOR) 20 MG tablet [Pharmacy Med Name: ATORVASTATIN 20 MG TABLET]; Take 1 tablet (20 mg total) by mouth daily.  Dispense: 90 tablet; Refill: 3    If protocol passes may refill for 12 months if within 3 months of last provider visit (or a total of 15 months).

## 2021-05-28 NOTE — TELEPHONE ENCOUNTER
RN cannot approve Refill Request    RN can NOT refill this medication Protocol failed and NO refill given.         Allison Celestin, [facility] Triage/Med Refill 5/20/2019    Requested Prescriptions   Pending Prescriptions Disp Refills     metFORMIN (GLUCOPHAGE-XR) 500 MG 24 hr tablet [Pharmacy Med Name: METFORMIN  MG TABLET] 180 tablet 3     Sig: TAKE 1 TABLET BY MOUTH 2 TIMES A DAY.       Metformin Refill Protocol Failed - 5/18/2019  8:27 AM        Failed - Microalbumin in last year      Microalbumin, Random Urine   Date Value Ref Range Status   03/29/2018 1.09 0.00 - 1.99 mg/dL Final                  Passed - Blood pressure in last 12 months     BP Readings from Last 1 Encounters:   04/04/19 130/70             Passed - LFT or AST or ALT in last 12 months     Albumin   Date Value Ref Range Status   04/04/2019 3.7 3.5 - 5.0 g/dL Final     Bilirubin, Total   Date Value Ref Range Status   04/04/2019 0.4 0.0 - 1.0 mg/dL Final     Alkaline Phosphatase   Date Value Ref Range Status   04/04/2019 74 45 - 120 U/L Final     AST   Date Value Ref Range Status   04/04/2019 30 0 - 40 U/L Final     ALT   Date Value Ref Range Status   04/04/2019 49 (H) 0 - 45 U/L Final     Protein, Total   Date Value Ref Range Status   04/04/2019 7.6 6.0 - 8.0 g/dL Final                Passed - GFR or Serum Creatinine in last 6 months     GFR MDRD Non Af Amer   Date Value Ref Range Status   04/04/2019 >60 >60 mL/min/1.73m2 Final     GFR MDRD Af Amer   Date Value Ref Range Status   04/04/2019 >60 >60 mL/min/1.73m2 Final             Passed - Visit with PCP or prescribing provider visit in last 6 months or next 3 months     Last office visit with prescriber/PCP: 4/4/2019 OR same dept: 4/4/2019 Martinez Carey MD OR same specialty: 4/4/2019 Martinez Carey MD Last physical: Visit date not found Last MTM visit: Visit date not found         Next appt within 3 mo: Visit date not found  Next physical within 3 mo: Visit date not found  Prescriber OR  PCP: Martinez Carey MD  Last diagnosis associated with med order: 1. Type 2 diabetes mellitus without complication (H)  - metFORMIN (GLUCOPHAGE-XR) 500 MG 24 hr tablet [Pharmacy Med Name: METFORMIN  MG TABLET]; TAKE 1 TABLET BY MOUTH 2 TIMES A DAY.  Dispense: 180 tablet; Refill: 2     If protocol passes may refill for 12 months if within 3 months of last provider visit (or a total of 15 months).           Passed - A1C in last 6 months     Hemoglobin A1c   Date Value Ref Range Status   04/04/2019 6.7 (H) 3.5 - 6.0 % Final

## 2021-05-30 ENCOUNTER — HEALTH MAINTENANCE LETTER (OUTPATIENT)
Age: 51
End: 2021-05-30

## 2021-05-30 NOTE — TELEPHONE ENCOUNTER
Refill Approved    Rx renewed per Medication Renewal Policy. Medication was last renewed on 4/3/90.    Allison Celestin, [facility] Triage/Med Refill 7/3/2019     Requested Prescriptions   Pending Prescriptions Disp Refills     losartan (COZAAR) 100 MG tablet [Pharmacy Med Name: LOSARTAN POTASSIUM 100 MG TAB] 90 tablet 0     Sig: TAKE 1 TABLET BY MOUTH EVERY DAY       Angiotensin Receptor Blocker Protocol Passed - 7/2/2019 11:43 AM        Passed - PCP or prescribing provider visit in past 12 months       Last office visit with prescriber/PCP: 4/4/2019 Martinez Carey MD OR same dept: 4/4/2019 Martinez Carey MD OR same specialty: 4/4/2019 Martinez Carey MD  Last physical: 3/29/2018 Last MTM visit: Visit date not found   Next visit within 3 mo: Visit date not found  Next physical within 3 mo: Visit date not found  Prescriber OR PCP: Maritnez Carey MD  Last diagnosis associated with med order: 1. Hypertension goal BP (blood pressure) < 140/90  - losartan (COZAAR) 100 MG tablet [Pharmacy Med Name: LOSARTAN POTASSIUM 100 MG TAB]; TAKE 1 TABLET BY MOUTH EVERY DAY  Dispense: 90 tablet; Refill: 0  - hydroCHLOROthiazide (HYDRODIURIL) 25 MG tablet [Pharmacy Med Name: HYDROCHLOROTHIAZIDE 25 MG TAB]; TAKE 1 TABLET BY MOUTH EVERY DAY  Dispense: 90 tablet; Refill: 0    If protocol passes may refill for 12 months if within 3 months of last provider visit (or a total of 15 months).             Passed - Serum potassium within the past 12 months     Lab Results   Component Value Date    Potassium 4.0 04/04/2019             Passed - Blood pressure filed in past 12 months     BP Readings from Last 1 Encounters:   04/04/19 130/70             Passed - Serum creatinine within the past 12 months     Creatinine   Date Value Ref Range Status   04/04/2019 0.78 0.70 - 1.30 mg/dL Final             hydroCHLOROthiazide (HYDRODIURIL) 25 MG tablet [Pharmacy Med Name: HYDROCHLOROTHIAZIDE 25 MG TAB] 90 tablet 0     Sig: TAKE 1 TABLET BY MOUTH EVERY DAY        Diuretics/Combination Diuretics Refill Protocol  Passed - 7/2/2019 11:43 AM        Passed - Visit with PCP or prescribing provider visit in past 12 months     Last office visit with prescriber/PCP: 4/4/2019 Martinez Carey MD OR same dept: 4/4/2019 Martinez Carey MD OR same specialty: 4/4/2019 Martinez Carey MD  Last physical: 3/29/2018 Last MTM visit: Visit date not found   Next visit within 3 mo: Visit date not found  Next physical within 3 mo: Visit date not found  Prescriber OR PCP: Martinez Carey MD  Last diagnosis associated with med order: 1. Hypertension goal BP (blood pressure) < 140/90  - losartan (COZAAR) 100 MG tablet [Pharmacy Med Name: LOSARTAN POTASSIUM 100 MG TAB]; TAKE 1 TABLET BY MOUTH EVERY DAY  Dispense: 90 tablet; Refill: 0  - hydroCHLOROthiazide (HYDRODIURIL) 25 MG tablet [Pharmacy Med Name: HYDROCHLOROTHIAZIDE 25 MG TAB]; TAKE 1 TABLET BY MOUTH EVERY DAY  Dispense: 90 tablet; Refill: 0    If protocol passes may refill for 12 months if within 3 months of last provider visit (or a total of 15 months).             Passed - Serum Potassium in past 12 months      Lab Results   Component Value Date    Potassium 4.0 04/04/2019             Passed - Serum Sodium in past 12 months      Lab Results   Component Value Date    Sodium 138 04/04/2019             Passed - Blood pressure on file in past 12 months     BP Readings from Last 1 Encounters:   04/04/19 130/70             Passed - Serum Creatinine in past 12 months      Creatinine   Date Value Ref Range Status   04/04/2019 0.78 0.70 - 1.30 mg/dL Final

## 2021-06-01 VITALS — HEIGHT: 71 IN | WEIGHT: 225 LBS | BODY MASS INDEX: 31.5 KG/M2

## 2021-06-01 VITALS — WEIGHT: 231.5 LBS | BODY MASS INDEX: 32.75 KG/M2

## 2021-06-01 VITALS — BODY MASS INDEX: 32.7 KG/M2 | WEIGHT: 231.2 LBS

## 2021-06-02 VITALS — BODY MASS INDEX: 32.11 KG/M2 | WEIGHT: 227 LBS

## 2021-06-02 VITALS — BODY MASS INDEX: 31.33 KG/M2 | WEIGHT: 221.5 LBS

## 2021-06-02 NOTE — TELEPHONE ENCOUNTER
Name of form/paperwork: Other:  Prescriber Response Form  Have you been seen for this request: N/A  Do we have the form: Form received at [facility].  Writer will fax form to [phone].  Fax will include a total of 2 pages.  When is form needed by: Not specified on form  How would you like the form returned: Fax  Fax Number: [phone]  Patient Notified form requests are processed in 3-5 business days: No  (If patient needs form sooner, please note that in this message.)  Okay to leave a detailed message? No
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
## 2021-06-02 NOTE — TELEPHONE ENCOUNTER
Checked with providers CA - she has not seen forms as well. Do you have the copy on hand? If so, can you please re-fax them to us and re-route this message to Dr. Carey's pool? Thanks!

## 2021-06-02 NOTE — TELEPHONE ENCOUNTER
Pt called back. I asked the pt if he knew of any forms or paperwork from the pharmacy that needs to be filled out by Dr. Carey and per pt said not that he is aware of. Completing task.

## 2021-06-02 NOTE — TELEPHONE ENCOUNTER
Still have not seen forms, will check with providers CA when she returns to work on Wednesday, 10/30/2019.

## 2021-06-03 VITALS
BODY MASS INDEX: 32.5 KG/M2 | RESPIRATION RATE: 20 BRPM | HEART RATE: 77 BPM | DIASTOLIC BLOOD PRESSURE: 78 MMHG | SYSTOLIC BLOOD PRESSURE: 134 MMHG | TEMPERATURE: 97.6 F | WEIGHT: 227 LBS | HEIGHT: 70 IN

## 2021-06-03 NOTE — TELEPHONE ENCOUNTER
Refill Approved    Rx renewed per Medication Renewal Policy. Medication was last renewed on 10/16/19.    Allison Celestin, [facility] Triage/Med Refill 11/8/2019     Requested Prescriptions   Pending Prescriptions Disp Refills     fluticasone propionate (FLONASE) 50 mcg/actuation nasal spray [Pharmacy Med Name: FLUTICASONE PROP 50 MCG SPRAY]  2     Sig: SPRAY 2 SPRAYS INTO EACH NOSTRIL EVERY DAY       Nasal Steroid Refill Protocol Passed - 11/7/2019 12:35 PM        Passed - Patient has had office visit/physical in last 2 years     Last office visit with prescriber/PCP: 4/4/2019 OR same dept: 4/4/2019 Martinez Carey MD OR same specialty: 4/4/2019 Martinez Carey MD Last physical: 10/16/2019 Last MTM visit: Visit date not found    Next appt within 3 mo: Visit date not found  Next physical within 3 mo: Visit date not found  Prescriber OR PCP: Martinez Carey MD  Last diagnosis associated with med order: 1. Vasomotor headache  - fluticasone propionate (FLONASE) 50 mcg/actuation nasal spray [Pharmacy Med Name: FLUTICASONE PROP 50 MCG SPRAY]; SPRAY 2 SPRAYS INTO EACH NOSTRIL EVERY DAY; Refill: 2     If protocol passes may refill for 12 months if within 3 months of last provider visit (or a total of 15 months).

## 2021-06-04 VITALS
SYSTOLIC BLOOD PRESSURE: 106 MMHG | WEIGHT: 220.4 LBS | DIASTOLIC BLOOD PRESSURE: 75 MMHG | BODY MASS INDEX: 31.85 KG/M2 | OXYGEN SATURATION: 95 % | HEART RATE: 95 BPM | RESPIRATION RATE: 16 BRPM | TEMPERATURE: 98.2 F

## 2021-06-05 VITALS
TEMPERATURE: 97.8 F | DIASTOLIC BLOOD PRESSURE: 89 MMHG | HEART RATE: 88 BPM | SYSTOLIC BLOOD PRESSURE: 139 MMHG | OXYGEN SATURATION: 95 % | WEIGHT: 226.5 LBS | BODY MASS INDEX: 33.55 KG/M2 | HEIGHT: 69 IN

## 2021-06-06 NOTE — PROGRESS NOTES
Pt came here for sore throat. Has been going on for 1 week. Pt has no cough. Has slight chest thightness, pt has pain with swallowing.     Allergies   Allergen Reactions     No Known Drug Allergies        No past medical history on file.      Current Outpatient Medications:      amLODIPine (NORVASC) 5 MG tablet, TAKE 1 TABLET (5 MG TOTAL) BY MOUTH DAILY., Disp: 90 tablet, Rfl: 3     atorvastatin (LIPITOR) 20 MG tablet, TAKE 1 TABLET (20 MG TOTAL) BY MOUTH DAILY., Disp: 90 tablet, Rfl: 3     citalopram (CELEXA) 20 MG tablet, Take 1 tablet (20 mg total) by mouth daily., Disp: 90 tablet, Rfl: 3     fluticasone propionate (FLONASE) 50 mcg/actuation nasal spray, SPRAY 2 SPRAYS INTO EACH NOSTRIL EVERY DAY, Disp: 48 g, Rfl: 2     hydroCHLOROthiazide (HYDRODIURIL) 25 MG tablet, TAKE 1 TABLET BY MOUTH EVERY DAY, Disp: 90 tablet, Rfl: 2     losartan (COZAAR) 100 MG tablet, TAKE 1 TABLET BY MOUTH EVERY DAY, Disp: 90 tablet, Rfl: 2     metFORMIN (GLUCOPHAGE-XR) 500 MG 24 hr tablet, TAKE 1 TABLET BY MOUTH 2 TIMES A DAY., Disp: 180 tablet, Rfl: 3     Social History     Tobacco Use     Smoking status: Never Smoker     Smokeless tobacco: Never Used   Substance Use Topics     Alcohol use: Not on file       ROS:  12 point ROS is done and aside that mention above all other review of system is negative    OBJECTIVE:  /75   Pulse 95   Temp 98.2  F (36.8  C) (Oral)   Resp 16   Wt 220 lb 6.4 oz (100 kg)   SpO2 95%   BMI 31.85 kg/m    GENERAL APPEARANCE: ill, alert and moderate  distress  EYES: conjunctiva clear  EARS:no cerumen.   Ear canals no erythema, TM's intact no erythema.    NOSE/MOUTH: Nose and mouth is normal, no erythema or lesions  THROAT: positive erythema w/ no tonsillar enlargement . positive exudates  NECK: supple, nontender, no lymphadenopathy  RESP: lungs clear to auscultation - no rales, rhonchi or wheezes  CV: regular rates and rhythm, normal S1 S2, no murmur noted  NEURO: awake, alert        Recent  Results (from the past 168 hour(s))   Rapid Strep A Screen-Throat    Collection Time: 03/15/20 12:54 PM    Specimen: Throat   Result Value Ref Range    Rapid Strep A Antigen No Group A Strep detected, presumptive negative No Group A Strep detected, presumptive negative        ASSESSMENT:   1. Sore throat  Rapid Strep A Screen-Throat    Group A Strep, RNA Direct Detection, Throat          PLAN:    Tylenol and ibuprofen prn for  Pain or fever,  Lots of rest and fluids.  Follow up in 4-7  if not better or sooner if getting worse .    Angela Nash MD

## 2021-06-07 NOTE — TELEPHONE ENCOUNTER
Patient Returning Call  Reason for call:  Patient calling back  Information relayed to patient:  Relayed below message from the provider.  Patient has additional questions:  No  If YES, what are your questions/concerns:  none  Okay to leave a detailed message?: No call back needed

## 2021-06-07 NOTE — PROGRESS NOTES
"Grant Slaughter is a 49 y.o. male who is being evaluated via a billable telephone visit.      The patient has been notified of following:     \"This telephone visit will be conducted via a call between you and your physician/provider. We have found that certain health care needs can be provided without the need for a physical exam.  This service lets us provide the care you need with a short phone conversation.  If a prescription is necessary we can send it directly to your pharmacy.  If lab work is needed we can place an order for that and you can then stop by our lab to have the test done at a later time.    If during the course of the call the physician/provider feels a telephone visit is not appropriate, you will not be charged for this service.\"     Patient has given verbal consent to a Telephone visit? Yes    Grant Slaughter complains of    Chief Complaint   Patient presents with     Follow-up     labs     Medication Refill     Letter for School/Work     for work       I have reviewed and updated the patient's Past Medical History, Social History, Family History and Medication List.    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  Recent virtual happy hour.  Works call center    No cig  Diabetes denies polyuria.  Abnl liver denies icterus  Hyperlipidemia on statin denies muscle pain  Hypertension denies chest pain or headache.  ASSESSMENT/PLAN:     Chronic stable/ labs  follow up persisting mild abnl lft ? Etoh..  follow up per result  coronary artery disease risk management    Additional diagnoses and related orders:  1. Moderate alcohol use disorder (H)  Comprehensive Metabolic Panel   2. Type 2 diabetes mellitus without complication, without long-term current use of insulin (H)  LDL Cholesterol, Direct    Glycosylated Hemoglobin A1c   3. Pure hypercholesterolemia  LDL Cholesterol, Direct   4. Abnormal liver enzymes  " Comprehensive Metabolic Panel   5. Type 2 diabetes mellitus without complication (H)  metFORMIN (GLUCOPHAGE-XR) 500 MG 24 hr tablet   6. Hypertension goal BP (blood pressure) < 140/90  losartan (COZAAR) 100 MG tablet    hydroCHLOROthiazide (HYDRODIURIL) 25 MG tablet    amLODIPine (NORVASC) 5 MG tablet   7. Adjustment disorder with mixed anxiety and depressed mood  citalopram (CELEXA) 20 MG tablet   8. Hyperlipidemia  atorvastatin (LIPITOR) 20 MG tablet       Chronic issues stable/ same treatment  Current Outpatient Medications on File Prior to Visit   Medication Sig Dispense Refill     amLODIPine (NORVASC) 5 MG tablet TAKE 1 TABLET (5 MG TOTAL) BY MOUTH DAILY. 90 tablet 3     atorvastatin (LIPITOR) 20 MG tablet TAKE 1 TABLET (20 MG TOTAL) BY MOUTH DAILY. 90 tablet 3     citalopram (CELEXA) 20 MG tablet Take 1 tablet (20 mg total) by mouth daily. 90 tablet 3     hydroCHLOROthiazide (HYDRODIURIL) 25 MG tablet TAKE 1 TABLET BY MOUTH EVERY DAY 90 tablet 0     losartan (COZAAR) 100 MG tablet TAKE 1 TABLET BY MOUTH EVERY DAY 90 tablet 0     metFORMIN (GLUCOPHAGE-XR) 500 MG 24 hr tablet TAKE 1 TABLET BY MOUTH 2 TIMES A DAY. 180 tablet 3     fluticasone propionate (FLONASE) 50 mcg/actuation nasal spray SPRAY 2 SPRAYS INTO EACH NOSTRIL EVERY DAY 48 g 2     No current facility-administered medications on file prior to visit.       2:04 PM

## 2021-06-09 ENCOUNTER — COMMUNICATION - HEALTHEAST (OUTPATIENT)
Dept: FAMILY MEDICINE | Facility: CLINIC | Age: 51
End: 2021-06-09

## 2021-06-09 DIAGNOSIS — E11.9 TYPE 2 DIABETES MELLITUS WITHOUT COMPLICATION (H): ICD-10-CM

## 2021-06-10 RX ORDER — METFORMIN HCL 500 MG
TABLET, EXTENDED RELEASE 24 HR ORAL
Qty: 180 TABLET | Refills: 3 | Status: SHIPPED | OUTPATIENT
Start: 2021-06-10 | End: 2022-05-03

## 2021-06-16 PROBLEM — F41.1 GENERALIZED ANXIETY DISORDER: Status: ACTIVE | Noted: 2018-05-04

## 2021-06-16 PROBLEM — Z63.4 BEREAVEMENT, UNCOMPLICATED: Status: ACTIVE | Noted: 2018-05-04

## 2021-06-16 PROBLEM — F10.20 MODERATE ALCOHOL USE DISORDER (H): Status: ACTIVE | Noted: 2018-05-04

## 2021-06-16 PROBLEM — F33.0 MILD EPISODE OF RECURRENT MAJOR DEPRESSIVE DISORDER (H): Status: ACTIVE | Noted: 2018-05-04

## 2021-06-16 PROBLEM — Z00.00 HEALTHCARE MAINTENANCE: Status: ACTIVE | Noted: 2021-04-16

## 2021-06-16 PROBLEM — E78.00 PURE HYPERCHOLESTEROLEMIA: Status: ACTIVE | Noted: 2019-04-04

## 2021-06-16 PROBLEM — E11.9 TYPE 2 DIABETES MELLITUS WITHOUT COMPLICATION, WITHOUT LONG-TERM CURRENT USE OF INSULIN (H): Status: ACTIVE | Noted: 2019-04-04

## 2021-06-16 PROBLEM — J31.0 CHRONIC RHINITIS: Status: ACTIVE | Noted: 2021-04-16

## 2021-06-16 NOTE — TELEPHONE ENCOUNTER
Former patient of ??? & has not established care with another provider.  Please assign refill request to covering provider per Clinic standard process.      RN cannot approve Refill Request    RN can NOT refill this medication no pcp. Last office visit: 4/4/2019 Martinez Carey MD Last Physical: 10/16/2019 Last MTM visit: Visit date not found Last visit same specialty: 4/16/2021 Clifford Evans MD.  Next visit within 3 mo: Visit date not found  Next physical within 3 mo: Visit date not found      Edgardo Bishop, [facility] Triage/Med Refill 4/20/2021    Requested Prescriptions   Pending Prescriptions Disp Refills     atorvastatin (LIPITOR) 20 MG tablet [Pharmacy Med Name: ATORVASTATIN 20 MG TABLET] 90 tablet 3     Sig: TAKE 1 TABLET BY MOUTH EVERY DAY       Statins Refill Protocol (Hmg CoA Reductase Inhibitors) Passed - 4/19/2021  6:29 PM        Passed - PCP or prescribing provider visit in past 12 months      Last office visit with prescriber/PCP: 4/4/2019 Martinez Carey MD OR same dept: 4/16/2021 Clifford Evans MD OR same specialty: 4/16/2021 Clifford Evans MD  Last physical: 10/16/2019 Last MTM visit: Visit date not found   Next visit within 3 mo: Visit date not found  Next physical within 3 mo: Visit date not found  Prescriber OR PCP: Martinez Carey MD  Last diagnosis associated with med order: 1. Hyperlipidemia  - atorvastatin (LIPITOR) 20 MG tablet [Pharmacy Med Name: ATORVASTATIN 20 MG TABLET]; TAKE 1 TABLET BY MOUTH EVERY DAY  Dispense: 90 tablet; Refill: 3    2. Adjustment disorder with mixed anxiety and depressed mood  - citalopram (CELEXA) 20 MG tablet [Pharmacy Med Name: CITALOPRAM HBR 20 MG TABLET]; TAKE 1 TABLET BY MOUTH EVERY DAY  Dispense: 90 tablet; Refill: 3    3. Hypertension goal BP (blood pressure) < 140/90  - amLODIPine (NORVASC) 5 MG tablet [Pharmacy Med Name: AMLODIPINE BESYLATE 5 MG TAB]; TAKE 1 TABLET BY MOUTH EVERY DAY  Dispense: 90 tablet; Refill: 3    If protocol passes  may refill for 12 months if within 3 months of last provider visit (or a total of 15 months).                citalopram (CELEXA) 20 MG tablet [Pharmacy Med Name: CITALOPRAM HBR 20 MG TABLET] 90 tablet 3     Sig: TAKE 1 TABLET BY MOUTH EVERY DAY       SSRI Refill Protocol  Passed - 4/19/2021  6:29 PM        Passed - PCP or prescribing provider visit in last year     Last office visit with prescriber/PCP: 4/4/2019 Martinez Carey MD OR same dept: 4/16/2021 Clifford Evans MD OR same specialty: 4/16/2021 Clifford Evans MD  Last physical: 10/16/2019 Last MTM visit: Visit date not found   Next visit within 3 mo: Visit date not found  Next physical within 3 mo: Visit date not found  Prescriber OR PCP: Martinez Carey MD  Last diagnosis associated with med order: 1. Hyperlipidemia  - atorvastatin (LIPITOR) 20 MG tablet [Pharmacy Med Name: ATORVASTATIN 20 MG TABLET]; TAKE 1 TABLET BY MOUTH EVERY DAY  Dispense: 90 tablet; Refill: 3    2. Adjustment disorder with mixed anxiety and depressed mood  - citalopram (CELEXA) 20 MG tablet [Pharmacy Med Name: CITALOPRAM HBR 20 MG TABLET]; TAKE 1 TABLET BY MOUTH EVERY DAY  Dispense: 90 tablet; Refill: 3    3. Hypertension goal BP (blood pressure) < 140/90  - amLODIPine (NORVASC) 5 MG tablet [Pharmacy Med Name: AMLODIPINE BESYLATE 5 MG TAB]; TAKE 1 TABLET BY MOUTH EVERY DAY  Dispense: 90 tablet; Refill: 3    If protocol passes may refill for 12 months if within 3 months of last provider visit (or a total of 15 months).                amLODIPine (NORVASC) 5 MG tablet [Pharmacy Med Name: AMLODIPINE BESYLATE 5 MG TAB] 90 tablet 3     Sig: TAKE 1 TABLET BY MOUTH EVERY DAY       Calcium-Channel Blockers Protocol Passed - 4/19/2021  6:29 PM        Passed - PCP or prescribing provider visit in past 12 months or next 3 months     Last office visit with prescriber/PCP: 4/4/2019 Martinez Carey MD OR same dept: 4/16/2021 Clifford Evans MD OR same specialty: 4/16/2021 Clifford Evans  MD Joe  Last physical: 10/16/2019 Last MTM visit: Visit date not found   Next visit within 3 mo: Visit date not found  Next physical within 3 mo: Visit date not found  Prescriber OR PCP: Martinez Carey MD  Last diagnosis associated with med order: 1. Hyperlipidemia  - atorvastatin (LIPITOR) 20 MG tablet [Pharmacy Med Name: ATORVASTATIN 20 MG TABLET]; TAKE 1 TABLET BY MOUTH EVERY DAY  Dispense: 90 tablet; Refill: 3    2. Adjustment disorder with mixed anxiety and depressed mood  - citalopram (CELEXA) 20 MG tablet [Pharmacy Med Name: CITALOPRAM HBR 20 MG TABLET]; TAKE 1 TABLET BY MOUTH EVERY DAY  Dispense: 90 tablet; Refill: 3    3. Hypertension goal BP (blood pressure) < 140/90  - amLODIPine (NORVASC) 5 MG tablet [Pharmacy Med Name: AMLODIPINE BESYLATE 5 MG TAB]; TAKE 1 TABLET BY MOUTH EVERY DAY  Dispense: 90 tablet; Refill: 3    If protocol passes may refill for 12 months if within 3 months of last provider visit (or a total of 15 months).             Passed - Blood pressure filed in past 12 months     BP Readings from Last 1 Encounters:   04/16/21 139/89

## 2021-06-16 NOTE — PROGRESS NOTES
Assessment/ Plan  Problem List Items Addressed This Visit        Unprioritized    Essential hypertension, benign     Borderline blood pressure today on losartan 50, amlodipine 5 and hydrochlorothiazide 25.  Check BMP.  No changes for now, follow-up 6 months         Relevant Orders    HM2(CBC w/o Differential) (Completed)    Comprehensive Metabolic Panel    Mild episode of recurrent major depressive disorder (H)     Renewal of citalopram         Moderate alcohol use disorder (H)     Patient indicates that he drinks heavily at times, mostly in social situations and not at other times.  Does not think he has a problem with this.  Discussed healthy drinking offered help.  Patient declined         Type 2 diabetes mellitus without complication, without long-term current use of insulin (H) - Primary     A1c 7.1, controlled on Metformin extended release 500 twice daily.  Referral to ophthalmology.  Also on losartan, atorvastatin 20.         Relevant Orders    Glycosylated Hemoglobin A1c (Completed)    Microalbumin, Random Urine    Ambulatory referral to Ophthalmology - [facility]    Pure hypercholesterolemia     Atorvastatin 20, await lipid and CMP         Healthcare maintenance     Patient will look online for Covid vaccine         Relevant Orders    Hepatitis C Antibody (Anti-HCV)    HIV Antigen/Antibody Screening [place]    Chronic rhinitis     Patient has believe this is been allergies.  Has some sneezing and clear discharge but a lot of pressure.  We will treat for nonspecific specific rhinitis with fluticasone.         Relevant Medications    fluticasone propionate (FLONASE) 50 mcg/actuation nasal spray        Subjective  CC:  Chief Complaint   Patient presents with     Establish Care     HPI:  50-year-old, previous patient of Dr. Carey.  Comes in for follow-up on diabetes.  Takes Metformin, does not check blood sugars.  Also history of hypertension, on medications discussed above.  No problems.  Employed,  "single.  Non-smoker.  Social alcohol use.  PFSH:  Patient Active Problem List   Diagnosis     Obesity     Essential hypertension, benign     Eczema     Acanthosis Nigricans     Generalized anxiety disorder     Mild episode of recurrent major depressive disorder (H)     Moderate alcohol use disorder (H)     Bereavement, uncomplicated     Type 2 diabetes mellitus without complication, without long-term current use of insulin (H)     Pure hypercholesterolemia     Healthcare maintenance     Chronic rhinitis     Current medications reviewed as follows:  Current Outpatient Medications on File Prior to Visit   Medication Sig     amLODIPine (NORVASC) 5 MG tablet Take 1 tablet (5 mg total) by mouth daily.     atorvastatin (LIPITOR) 20 MG tablet Take 1 tablet (20 mg total) by mouth daily.     citalopram (CELEXA) 20 MG tablet Take 1 tablet (20 mg total) by mouth daily.     hydroCHLOROthiazide (HYDRODIURIL) 25 MG tablet Take 1 tablet (25 mg total) by mouth daily.     losartan (COZAAR) 100 MG tablet Take 1 tablet (100 mg total) by mouth daily.     metFORMIN (GLUCOPHAGE-XR) 500 MG 24 hr tablet Take 1 tablet (500 mg total) by mouth 2 (two) times a day.     [DISCONTINUED] fluticasone propionate (FLONASE) 50 mcg/actuation nasal spray SPRAY 2 SPRAYS INTO EACH NOSTRIL EVERY DAY     No current facility-administered medications on file prior to visit.      Social History     Tobacco Use   Smoking Status Never Smoker   Smokeless Tobacco Never Used     Social History     Social History Narrative     Not on file     Patient Care Team:  Provider, No Primary Care as PCP - Martinez Barnett MD as Assigned PCP  ROS  As above      Objective  Physical Exam  Vitals:    04/16/21 1435   BP: 139/89   Patient Site: Right Arm   Patient Position: Sitting   Cuff Size: Adult Large   Pulse: 88   Temp: 97.8  F (36.6  C)   TempSrc: Oral   SpO2: 95%   Weight: (!) 226 lb 8 oz (102.7 kg)   Height: 5' 9\" (1.753 m)     Body mass index is 33.45 kg/m .  Gen- " alert, oriented x3  No acute distress  Chest- Normal inspiration and expiration.    Clear to ascultation.    No chest wall deformity or scar.  CV- Heart regular rate and rhythm  normal tones   no murmurs   No gallops or rubs.  Ext- warm and dry   no edema  Skin-  no visualized rash  Foot monofilament test performed-  Sensation intact      Diagnostics:   Results for orders placed or performed in visit on 04/16/21   HM2(CBC w/o Differential)   Result Value Ref Range    WBC 6.6 4.0 - 11.0 thou/uL    RBC 5.09 4.40 - 6.20 mill/uL    Hemoglobin 15.5 14.0 - 18.0 g/dL    Hematocrit 45.8 40.0 - 54.0 %    MCV 90 80 - 100 fL    MCH 30.5 27.0 - 34.0 pg    MCHC 33.8 32.0 - 36.0 g/dL    RDW 12.6 11.0 - 14.5 %    Platelets 254 140 - 440 thou/uL    MPV 9.0 7.0 - 10.0 fL   Glycosylated Hemoglobin A1c   Result Value Ref Range    Hemoglobin A1c 7.1 (H) <=5.6 %           Please note: Voice recognition software was used in this dictation.  It may therefore contain typographical errors.

## 2021-06-17 NOTE — PATIENT INSTRUCTIONS - HE
Patient Instructions by Anthony Temple MD at 4/2/2019  2:50 PM     Author: Anthony Temple MD Service: -- Author Type: Physician    Filed: 4/2/2019  4:05 PM Encounter Date: 4/2/2019 Status: Addendum    : Anthony Temple MD (Physician)    Related Notes: Original Note by Anthony Temple MD (Physician) filed at 4/2/2019  4:04 PM       Advised fluids, close follow up in Urgent Care or Emergency Department if your symptoms worsen.      Patient Education     Treating Pneumonia  Pneumonia is an infection of one or both of the lungs. Pneumonia:    Is usually caused by either a virus or a bacteria    Can be very serious, especially in infants, young children, and older adults. Its also serious for those with other long-term health problems or weakened immune systems.    Is sometimes treated at home and sometimes in the hospital    Antibiotic medicines  Antibiotics may be prescribed for pneumonia caused by bacteria. They may be pills (oral medicines), or shots (injections). Or they may be given by IV (intravenously) into a vein. If you are taking oral medicines at home:    Fill your prescription and start taking your medicine as soon as you can.    You will likely start to feel better in a day or 2, but dont stop taking the antibiotic.    Use a pill organizer to help you remember to take your medicine.    Let your healthcare provider know if you have side effects.    Take your medicine exactly as directed on the label. Talk to your provider or pharmacist if you have any questions.  Antiviral medicines  Antiviral medicine may be prescribed for pneumonia caused by a virus. For example, antiviral medicine may be prescribed for pneumonia caused by the flu virus. Antibiotics do not work against viruses. If you are taking antiviral medicine at home:    Fill your prescription and start taking your medicine as soon as you can.    Talk with your provider or pharmacist about possible side effects.    Take the  medicine exactly as instructed.  To relieve symptoms  There are many medicines that can help relieve symptoms of pneumonia. Some are prescription and some are over-the-counter.  Your healthcare provider may recommend:    Acetaminophen or ibuprofen to lower your fever and to lessen headache or other pain    Cough medicine to loosen mucus or to reduce coughing  Make sure you check with your healthcare provider or pharmacist before taking any over-the-counter medicines.  Special treatments  If you are hospitalized for pneumonia, you may have other therapies, including:    Inhaled medicines to help with breathing or chest congestion    Supplemental oxygen to increase low oxygen levels  Drink fluids and eat healthy  You should eat healthy to help your body fight the infection. Drinking a lot of fluids helps to replace fluids lost from fever and to loosen mucus in your chest.    Diet. Make healthy food choices, including fruits and vegetables, lean meats and other proteins, 100% whole grain and low- or no-fat dairy products.    Fluids. Drink at least 6 to 8 tall glasses a day. Water and 100% fruit or vegetable juice are best.  Get plenty of rest and sleep  You may be more tired than usual for a while. It is important to get enough sleep at night. Its also important to rest during the day. Talk with your healthcare provider if coughing or other symptoms are interfering with your sleep.  Preventing the spread of germs  The best thing you can do to prevent spreading germs is to wash your hands often. You should:    Rub your hand with soap and water for 20 to 30 seconds.    Clean in between your fingers, the backs of your hands, and around your nails.    Dry your hands on a separate towel or use paper towels.  You should also:    Keep alcohol-based hand  nearby.    Make sure you also clean surfaces that you touch. Use a product that kills all types of germs.    Stay away from others until you are feeling better.  When  to call your healthcare provider  Call your healthcare provider if you have any of the following:    Symptoms get worse    Fever continues    Shortness of breath gets worse    Increased mucus or mucus that is darker in color    Coughing gets worse    Lips or fingers are bluish in color    Side effects from your medicine   Date Last Reviewed: 12/1/2016 2000-2017 [employer]. [address] [place] [id number]. All rights reserved. This information is not intended as a substitute for professional medical care. Always follow your healthcare professional's instructions.

## 2021-06-17 NOTE — PROGRESS NOTES
Standard Diagnostic Assessment    Date(s): 2018  Start Time: 8:00am  Stop Time: 9:00am  Patient Name: Grant Slaughter  Age: 47   1970      Referral Source: Martinez Carey MD  Therapist: Alicia PRICE        Persons Present: Patient and Therapist    Chief Complaint (in the patients words; reason patient believes they have been referred):   Loss of parents    Patient s expectation for treatment (patient stated initial goal; i.e.:  I want to let go of my worries , Medication treatment if indicated):  Therapy only. Grief work- to be proactive about the loneliness it created.    Presenting Problem/History:  Issues/Stressors:   -History of depression and anxiety  -Parents passed away in       Physical Problems: none reported    Social Problems: Unstable relationships, Problems with relatives, Decreased social activity and Loss of interest in activities    Behavioral Problems: Subtance abuse    Cognitive Problems: Perfectionism, Recurrent bad memories and Worries    Emotional Problems: Anxious, Angry, Nervous, Emptiness, Mood swings and Feelings of guilt        Functional Impairments:   Personal: 4  Family: 4  Work: 4  Social: 4     How does the presenting problem affect patients daily functioning:     When patient's anxiety is really bad, he is unable to calm down. He feels frantic. He reports being unable to function at work.  He experiences mood swings and reports unstable relationships. He has decreased social activity and no longer finds interest in activities.     Onset/Frequency/Duration presenting problem symptoms:    Reports depression and anxiety for over a decade. It has increased in the last year because of loss of parents.     How does the patient perceive his/her problem?  He reports a tendency to be organized and needing a plan in place in order to not feel anxious. He believes he is obsessive about things, such as keeping the kitchen clean. He believes his symptoms of depression and  anxiety are worse since his parents . However, he has had long standing history of depression and anxiety. He also reports a family history of depression and anxiety, which he believes plays a role.     Family/Social History:     Current living situation (Household members, housing status, stability, multiple moves, potential eviction):  By himself. Housing stable. Currently at his father's home waiting for it to either foreclose or to negotiate.     Marriages/Significant other (including patients evaluation of the relationship quality):  Single    Children (sex and ages, any significant issues):  None Reported     Parents (ages, living or , how many years ):   Both passed away in 2017.   They  when patient was 17 due to fahter spending money on drugs and alcohol.  Dad was loud and outgoing- a character  Mom was quiet and kind.   Both remarried. Dad had a 2nd divorce. Step dad passed away from cancer 2 years before mom.     Siblings (birth order, ages, significant issues):   Patient is the youngest child.  1 sister ([place])- strained relationship. 5 years older.   1 sister (Sifuentes Edvin)- good relationship. 13 years older.  A brother who  at about 2 days old, before patient was born.   Another sister who  at age 16 from a car accident when patient was 2.5 years old.    Climate in family of origin (how does the patient perceive their childhood experience):  Born in [place]. Moved to [place] at age 6 after sister  in attempts to get away from [facility]. Live din [place] until 25. Parents weren't strict on him because he was the youngest. He reports some problems when parents . He recalls his father's bad drug and alcohol problem added a lot of stress to life. He reports times his dad was homeless, would steal and lie.     Education (type and level of education):  Bachelor's degree from [employer]   Problems with Learning or School (developmental issues, learning disabilities,  behavioral concerns in school)  None Reported   Developmental factors (developmental milestones, head injuries, CVA s, etc. 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        Financial Concerns (basic status, housing, food, clothing are they on any assistance including SSI/SSDI):   Needs are met.    Significant life events (what does the patient identify as a personal life changing/influencing event):  Parents   20- Uncle passed away in home with mom and patient. At [employer] and he was allergic. Patient witnessed it all. Recalls this was the moment he had to become a man all of the sudden. He had to call and tell the family members.   - Job loss. Left after conflict with board and staff. Period of unemployment due to bad economy. Felt job was secure and then it all crashed. Still dreams about it at times.       Sexual/physical/emotional/financial abuse/traumatic event. (any child protection involvement; who reported, Impact on patient/family/other):   None Reported   PTSD Symptoms:     [] Yes, including      [x] No      Contextual Non-personal factors contributing to the patients concerns (divorce in family, nation/natural disasters):  Parents       Significant personal relationships including patient s evaluation of the relationship quality (Co-worker s, neighbor s, AA groups, Congregational peers, etc.):   Sister in [place] and her oldest son (age 32) with 5 great nephews.   Good friend network, but the only single one in the group.  One friend lost his sister so they have connected about loss.    Support network(s)/Resources (including strength and quality of social networks, who does the client consider supportive, other agencies or services patient uses):   Starting to volunteer at [employer].  No  other social networks or agency support.     Belief system:    Kindness. Be good to people. Believes in a high power. Not organized Zoroastrianism.       Cultural influences and impact on patient (ask about all aspects of culture and ask which are relevant to the patient. Go beyond nationality and ethnicity. Consider biases, life style, community style, i.e.: urban, poverty, abuse, etc). See page 5 Diagnostic Assessment, Clinical Training for descriptors):  Patient is a  male who is 47 years old. He was born and raised in [place]. He is single with no children. He is the youngest child. Patient believes bad things happen in 3s. He learned this from his mother because of her losses. She would say this phrase based on her experiences. His 2 parents have  in the last year, so now he is waiting for the 3rd thing.   He has found that being needed is satisfying, such as caring for his parents, as it feels good to be trusted.     Cultural impact on health and health care (how does patient s culture influence how the patient receives health care):   Stopped citalopram for a week once, learned he needed it. Therefore, has learned to be adherent with medications. Has not established a primary care physician at this time. Therefore, not clear how much preventative health care he engages in.     Legal Problems (DUI S, divorce, law suits, etc.):  None Reported     Strengths/personal resources (what does the patient do well, what is going well in life, positive personality characteristics):  Top performer at job.  Organizational. Caring, helping others. Kindness and selin.      Weaknesses (what does patient identify as a weakness):  Don't have social energy.   Having to push self out of my comfort zone now.    Hobbies/Interests:    Walks, exercises. Time with family or friends. Watching sports or attending games. Politics. Music. News and podcasts.      Assessment of client needs (based on baseline measurements, symptoms,  "behaviors, skills, abilities, resources, vulnerabilities, safety needs):    Psychotherapy     Continue medication adherence    Mindfulness/relaxation techniques to decrease anxiety    CD support group/treatment    Grief/Loss group      Family Mental Health/Medical History    Family Mental Health:    Sister- depression  \"most of family\"    Family history of Suicide:  Aunt's     Family history Chemical Dependency:    Sister- drugs and alcohol  Dad-alcohol   \"we all struggled... Dad, me, other sister.\"    Family Medical history:   Mom- cancer      Patient Medical History    Hospitalizations (When/Where):     None Reported       Medical diagnoses/concerns: (i.e.: Heart disease, thyroid problems,  Bld. Pressure,  seizures,  head Inj., Other)   Patient Active Problem List   Diagnosis     Obesity     ACP Staging Stage 2 Hypertension: Greater Than Or = 160/100     Eczema     Acanthosis Nigricans         Current physician/other non psychiatric medical provider s:    Dr. Carey                  Date of last medical exam:   03/29/2018    Current Medications:  Current Outpatient Prescriptions   Medication Sig Dispense Refill     amLODIPine (NORVASC) 5 MG tablet Take 1 tablet (5 mg total) by mouth daily. 90 tablet 3     atorvastatin (LIPITOR) 20 MG tablet Take 1 tablet (20 mg total) by mouth daily. 90 tablet 3     citalopram (CELEXA) 20 MG tablet Take 1 tablet (20 mg total) by mouth daily. 90 tablet 3     hydroCHLOROthiazide (HYDRODIURIL) 25 MG tablet Take 1 tablet (25 mg total) by mouth daily. 90 tablet 3     losartan (COZAAR) 100 MG tablet Take 1 tablet (100 mg total) by mouth daily. 90 tablet 3     metFORMIN (GLUCOPHAGE-XR) 500 MG 24 hr tablet Take 1 tablet (500 mg total) by mouth 2 (two) times a day. 180 tablet 3     No current facility-administered medications for this visit.     *Reports he started Citalopram after the job loss in 2008      Past Mental Health History:    Previous mental health diagnosis & Date of " "Diagnosis:  Depression and anxiety- exact date unknown. First psychotropic treatment was in 2008.    Hx of Mental Health Treatment or Services:  Therapy in childhood. No other details provided- unable to recall.    JOHN Received:      [] Yes   [x] No      Hx of MH Tx/Hospitalizations (When/Where: must include a review of patient s record.  If not available, why, what if anything are you doing to obtain a record?):   None Reported     Hx of Psychiatric Medications:  Citalopram 20 mg for about 10 years or so. Helps reduce anxiety.      Suicidal/Homicidal Risk Assessment:  Suicidal: None reported- \"I couldn't do that to my nephews\"   Ideation:Denies SI  History of Past Attempt(s): description: none reported  Crisis Plan: Reviewed crisis plan to call 911, go to nearest ER, or contact other supports/services. Provided printout of contact information for [facility] crisis, suicide hotline, and  urgent care.      Homicidal: None reported   Ideation:Denies HI  History of Aggression towards others: none reported  Crisis Plan: No concern noted. Reviewed crisis plan to call 911 if concerns arise    History of destruction to property:  Description: None Reported   Crisis Plan: no concerns noted. Reviewed crisis plan to call 911 if concerns arise    Chemical Use/Abuse History  Alcohol:   [] None Reported    [x] Yes   [] No  Type: Beer   Frequency (daily, weekly, occasionally): 3-4 nights/week. Drinks 4-6 drinks in a sitting.   Age of first use: teenage years    Date of last use: this week  \"On and off alcohol problem\"          Street Drugs:   [] None Reported    [] Yes   [x] No  Type: No current use.   Past history of trying marijuana \"a couple times in college\"       Prescription Drugs:   [] None Reported    [] Yes   [x] No  Type: Denies any use/concerns     Tobacco:   [] None Reported    [] Yes   [x] No  Type: No current use   Reports a \"minor amount in high school\"    Caffeine:   [] None Reported    [x] Yes   [] No  Type: " coffee   Frequency (daily, weekly, occasionally): 3-4 cups.   Age of first use: college    Date of last use: today  Stops by 9am and changes to water.    Currently in a treatment program:   [] Yes   [x] No    Where:  No treatment  JOHN Received:    [] Yes   [x] No       Collaborative info requested/received:   [] Yes   [x] No    Comments: no treatment  History of CD Treatment:      [x] None Reported             Description: no history     CAGE-AID (screening to determine a patients use/abuse/dependency):      0/4      Non- Substance Abuse addictive Behaviors/Compulsive Behaviors:  [] Gambling     [] Sex     [] Pornography    [] Shopping     [] Eating     [] Self-Injury  [] Other           [x] None Reported    [] Hoarding  Comments:   None Reported     MENTAL STATUS EVALUATION  Grooming: Well groomed  Attire: Appropriate  Age: Appears Stated  Behavior Towards Examiner: Cooperative  Motor Activity: Within normal   Eye Contact: Appropriate  Mood: Sad  Affect: Congruent w/content of speech, Anxious and Depressed  Speech/Language: Within normal  Attention: Within normal  Concentration: Within normal  Thought Process: Within normal  Thought Content: Hallucinations: Within noraml  Delusions: Within normal  Orientation: X 3  Memory: No Evidence of Impairment  Judgement: No Evidence of Impairment  Estimated Intelligence: Average  Demonstrated Insight: Adequate  Fund of Knowledge: adequate      Clinical Impressions/Assessment/Recommendations:   Clinical summary: Cause, prognosis, likely consequences of symptoms. Strengths, Cultural influences, Life situations, relationships, health concern, and how Dx interacts or impacts with client s life.   Patient is a 47 year old,  male who presented for a diagnostic assessment as referred by primary care physician, Dr. Carey, at the [facility] for symptoms of grief/loss and history of depression and anxiety. Therapist and patient reviewed consent and privacy policy. Patient  reported understanding and signed document- sent to be scanned into EMR. Patient presented on time and was well groomed. Patient was born in [facility], but moved to [place] from age [age]. Patient recalls a childhood that included parents who  when he was 17. He was the youngest child and has 2 older sisters who are living. He lost an older sister when she was 16 from a car accident. There was also an older brother who  around 2 days old before patient was alive. He recalls his mother being affected by the loss of children. His father had problems with drugs and alcohol, which added stress. In 2017, both of his parents . His mother  from cancer in in July. His father  from a fall/TBI in September. This loss has been difficult for him as he was very close to his parents. Patient currently lives in [place] by himself. He is single and without children. He has a good friend network, is close to his sister in [place], and his nephews. He has a strained relationship with his sister in [place] who has substance use and mental health problems. 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 Patient endorses the following concerns that are impacting daily functioning: unstable relationships, problems with relatives, decreased social activity, loss of interest in activities, perfectionism, bad memories, worries, anxious, angry, nervous, emptiness, mood swings, feelings of guilt. Patient experiences the following health concerns: hypertension, obesity. Patient finds difficulty with functioning at work, socializing with others, dealing with people he doesn't know, and maintaining friendships. Patient posesses the following strengths: organized, caring, kind, helpful, top performer at work.     Prioritization of needed mental Health ancillary or other services.   Patient is interested in psychotherapy services for grief  "work. He is not interested in psychiatry services at this time. He is on medication and the clinic physician, Dr. Carey, is managing it at this time.     How Diagnostic criteria is met: (Include symptoms, frequency, duration, functional impairments).  Patient reports a history of depression and anxiety for over a decade. He reports symptoms have increased in the last year due to the loss of his parents. He is currently prescribed citalopram to manage symptoms. He reports taking it for over 10 years. He endorses the following symptoms: depressed mood, anhedonia, fatigue, decreased socialization. Patient scored 4 on PHQ-9 and reports some difficulty in functioning. Patient denies SI. PANSI measure indicates high risk for suicide due to low protective factors. In regards to suicide, he states, \"I couldn't do that to my nephews.\" He endorses the following symptoms of anxiety: anxious, inability to control worry, worry about different things, fear something awful will happen. He scored 8 on SHARON-7 measure and reports some difficulty in functioning. He reports a need to plan and be organized to decrease anxiety. When he is anxious, he struggles to calm down and becomes frantic. Based on patient's history and current reported symptoms, patient meets DSM-5 criteria for Major Depressive Disorder, recurrent, mild and Generalized Anxiety Disorder.    Patient reports feelings of grief/loss due to the passing of his parents. He feels lonely and reports emptiness. Both of his parents passed away in . His mother  of cancer in 2017. His father  from fall/TBI in 2017. He expresses concern about upcoming anniversaries of their death and holidays like mother's day. Based on patient's history and current reported symptoms, patient meets DSM-5 criteria for Bereavement, uncomplicated.   Patient reports drinking beer 3-4 nights a week. When he drinks, he drinks about 4-6 drinks. He reports an \"on and off alcohol " "problem.\" He does not have any other substance use concerns. He meets the following criteria: increase amount of drinking and frequency, unsuccessful efforts to cut down/contorl use, craving, and continued use despite knowledge of problems, tolerance. Based on patient's history and current reported symptoms, patient meets DSM-5 criteria for Alcohol Use Disorder, moderate.    Explanation for any provisional diagnosis. Hypothesis why alternative diagnosis was considered and ruled out.  He does not report any past manic episodes or current concerns with telma/hypomania. Ruled out bipolar disorder.     Recommendations   Patient would benefit from continued psychotherapy services every 1-2weeks to further address presenting symptoms and concerns. Patient may also benefit from the following services and referrals:     Continue medication adherence    Mindfulness/relaxation techniques to decrease anxiety    CD support group/treatment    Grief/Loss group      Diagnosis   Major Depressive Disorder, recurrent, mild  Generalized Anxiety Disorder  Moderate Alcohol Use Disorder  Bereavement    Provisional Diagnosis   There is no provisional diagnosis for this patient as patient clearly meets DSM-5 criteria for the diagnosis listed above.    WHODAS 2.0 12-item version: Total = 4 or 8%     Scores presented in qualifiers to represent level of disability.  MILD Problem (slight, low, ...) 5-24%    H1= 30  H2= 0  H3= 2    Sources/references used in completing this assessment:   -Face to face interview  -Patient Chart  -Measures completed: WHODAS, PANSI, CAGE, PHQ-9, SHARON-7, Mood Disorder (current)  PANSI: Positive Factors = 2.83 (<3.4 = high risk)               Negative Factors = 1 (>1.6 = high risk)               Indicates high risk for suicide.    PHQ-9: 4 . Difficulty with daily functioning= some .              Indicates mild severity.    SHARON-7: 8 . Difficulty with daily functioning= somewhat .               Indicates mild-moderate " "severity.     Mood: 1 total \"yes\". Problem severity =  somewhat.      Assessment of client resolving presenting mental health concerns:  Ability  [] low     [x] average     [] high  Motivation [] low     [x] average     [] high  Willingness [] low     [x] average     [] high    Initial Assessment Objectives (ex: Refer to psychiatry/psych testing, Return for follow up psychotherapy, Refer to, Obtain, Administer measures, etc.):  1. Patient and therapist will develop therapeutic relationship.  2. Patient to present for follow up appointment to initiate psychotherapy services.  3. Patient to continue to follow up with primary care physician as needed and take medications as prescribed.  4. Develop comprehensive treatment plan.      Is patient's family involved in the treatment?  [x] No     [] Yes  If yes, How?  Patient did not request family involvement at this time.    If no, Why?  Patient did not request family involvement at this time.        Therapist s Signature/Supervision/co-signature statement:   [facility] Cabrera [facility] 04[facility]27/2018      "

## 2021-06-17 NOTE — PROGRESS NOTES
New pt from [facility].  Unhappy there as change in the care.  Personality.  Communication.  Provider vs pt. Difference in values and approaching    diabetes mellitus  Hyperlipidemia  Hypertension  eczema    Recent uri  10 days.  Dry.   Feels bronchial.  In chest.   Not better.  Some diarrhea.  No headache.  No fever.         2packs as teenager    hosp surg    fx arm.   Fell our of car at age [age].  In [facility]: father heart and diabetes mellitus started in 50s  Was smoker.  Mother  cancer last year lung quit cig in 50s.  Dad  from a fall.  Accident.  Was hanging a plant.  Lost both last year.    Family members with depression.  Sister on meds.    Pt mood and wants therapy    Med  Met 500 2 am  Los 100  Ator 20  hctz 25  Citalopram 20.  Decade and a half.   Reduces anxiety.  When bad unable to calm down and is frantic and unable to function at work  Amlodipine 5    nkda    hab neg cig   occ etoh  Fhsh: single  Works at [employer] at and t      Works with [employer] business which are past due.    Exercise.  Walks a lot      Uses a small gym at work.   Elliptical and treadmill      Exercise ball u tube    Diabetes denies polyuria.  Hypertension denies chest pain or headache.  Hyperlipidemia on statin denies muscle pain  Adjustment disorder with depressed mood denies suicidal ideation  Obesity denies polyuria      ROS:  Constitutional: denies fever.   No wt change  Vision: denies change in vision.  A year since dilated exam  ENT: denies cough or congestion  Thyroid: denies unusual fatigue  Resp: denies shortness of breath  Card: denies palpitations  GI: denies vomiting or abnormal stool, melena, hematochezia  : denies dysuria  Neuro: denies numbness or weakness  Derm: denies rash  Joints: denies redness or swelling  Endo: denies polyuria.   Night urine none.  Mental health: has been able to function through the past year.     Has extended family.  Nephew in [place]  Extremities: no edema  Mobility:  stable    Otherwise negative review of systems    ROS: as noted above    OBJECTIVE:   Vitals:    03/29/18 0937   BP: 124/82   Pulse: 88   Resp: 20   Temp: 97  F (36.1  C)      Wt is noted.  No diaphoresis  Eyes: nl eom, anicteric   External ears, nose: nl    Neck: nl nodes, supple, thyroid normal   Lungs: rhonchi   Heart: regular rhythm  Abd: soft nontender     No cva (renal) tenderness  Neuro: no weakness  Skin no rash  Joints: uninflamed   No ketotic breath odor noted  Mental: euthymic  Ext: nontender calves   Gait: normal    Bmi:  31      ASSESSMENT/PLAN:   Health Maintenance/ Plan: anticipatory guidance, discussion of risk factors, lifestyle modification, and risk factor management.      follow up per labs  Suggest standing desk for calorie burning    Additional diagnoses and related orders:  1. Obesity  Comprehensive Metabolic Panel   2. Type 2 diabetes mellitus without complication  Glycosylated Hemoglobin A1c    Microalbumin, Random Urine    Ambulatory referral to Diabetes Education Program (Existing Diagnosis)    metFORMIN (GLUCOPHAGE-XR) 500 MG 24 hr tablet   3. Hyperlipidemia  LDL Cholesterol, Direct    Comprehensive Metabolic Panel    atorvastatin (LIPITOR) 20 MG tablet   4. Acute bronchitis  azithromycin (ZITHROMAX) 250 MG tablet    dextromethorphan-guaiFENesin (ROBITUSSIN-DM)  mg/5 mL liquid   5. Adjustment disorder with mixed anxiety and depressed mood  Ambulatory referral to Psychology    citalopram (CELEXA) 20 MG tablet   6. Hypertension goal BP (blood pressure) < 140/90  losartan (COZAAR) 100 MG tablet    hydroCHLOROthiazide (HYDRODIURIL) 25 MG tablet    amLODIPine (NORVASC) 5 MG tablet   7. Immunization due  Tdap vaccine greater than or equal to 8yo IM   8. Eczema
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
## 2021-06-18 NOTE — PROGRESS NOTES
Mental Health Visit Note    5/31/2018    Start time: 4:00pm    Stop Time: 5:00pm   Session # 2    Grant Slaughter is a 47 y.o. male is being seen today for    Chief Complaint   Patient presents with     MH Follow Up     Patient presented for follow up psychotherapy to address symptoms of depression, anxiety, grief and substance use.   .     New symptoms or complaints: concerned about upcoming anniversary of dad's fall. occasional work and family stress.    Functional Impairment:   Personal: 4  Family: 4  Work: 3  Social:3    Clinical assessment of mental status:   Grooming: Well groomed  Attire: Appropriate  Age: Appears Stated  Behavior Towards Examiner: Cooperative  Motor Activity: Within normal   Eye Contact: Appropriate  Mood: Sad  Affect: Congruent w/content of speech and Anxious  Speech/Language: Within normal  Attention: Within normal  Concentration: Within normal  Thought Process: Within normal  Thought Content: Hallucinations: Within noraml  Delusions: Within normal  Orientation: X 3  Memory: No Evidence of Impairment  Judgement: No Evidence of Impairment  Estimated Intelligence: Average  Demonstrated Insight: Adequate  Fund of Knowledge: adequate       Suicidal/Homicidal Ideation present: None Reported This Session    Patient's impression of their current status: Patient reports he has been spending a lot of time with family. He is very close with his nephews. He has occasional work stress, but manages it relatively well. He misses coming home and being able to vent to his parents about any work stress. He notes some interpersonal stress with his sister at this time. He plans to contact her this evening as she also appears to be grieving the loss of their parents. He noted how they all have different triggers for grief and it is hard to know what might trigger each other. Next week is the anniversary of his father's fall, which started his health decline. He is wondering how he will handle the first year  anniversary. Last year he was busy with the stress of it and the care-taking responsibilities. He noted being so distracted last year that he didn't even grieve.     Therapist impression of patients current state: Patient presented on time for session. He was well groomed and oriented. Mood was sad at times; not tearful. He was reflective and appears to have many memories going through his mind. Engaged in problem-solving together regarding ways to work through the upcoming anniversaries of his parents' illnesses and deaths. Explored willingness to allow the grieving process rather that overly avoid and distract. He expresses a desire to find a balance for this because he noted if he doesn't grieve now, he will be holding onto it and it will come up sometime. Normalized the grieving process. He has plans to be with many family members over the next 3 weekends, which should be helpful for him. Encouraged him to reach out to friends on week nights or engage in other healthy coping skills such as going for a walk, going to a sporting event, writing/journaling about feelings. Discussed monitoring alcohol use. Provided some grief resources including a support group, journaling topics for grief, and a remembrance poem.     Type of psychotherapeutic technique provided: Insight oriented, Client centered, Solution-focused and motivational interviewing    Progress toward short term goals:Progress as expected, taking medications as prescribed, still waiting to hear back on volunteer positions. Functioning at work. Encouraged more physical activity.    Review of long term goals: Effective 05/17/2018-08/17/2018. Patient signed off and scanned into EMR.    Diagnosis:   1. Generalized anxiety disorder    2. Mild episode of recurrent major depressive disorder    3. Moderate alcohol use disorder    4. Bereavement, uncomplicated       Plan and Follow up: Patient is scheduled for a follow up sessions on 6/18/18, 7/9/18,  7/23/18.      Discharge Criteria/Planning: Patient will continue with follow-up until therapy can be discontinued without return of signs and symptoms.    Alicia Cabrera [facility] 5/31/2018

## 2021-06-18 NOTE — PROGRESS NOTES
Mental Health Visit Note    5/17/2018    Start time: 3:04pm    Stop Time: 4:02pm   Session # 1 (initial DA)    Grant Slaughter is a 47 y.o. male is being seen today for    Chief Complaint   Patient presents with     MH Follow Up     Presented for follow up psychotherapy appointment to address symptoms of anxiety, depression and bereavement   .     New symptoms or complaints: Grieving loss of mother over mother's day.     Functional Impairment:   Personal: 4  Family: 4  Work: 3  Social:3    Clinical assessment of mental status:   Grooming: Well groomed  Attire: Appropriate  Age: Appears Stated  Behavior Towards Examiner: Cooperative  Motor Activity: Within normal   Eye Contact: Appropriate  Mood: Depressed  Affect: Blunted and Flat  Speech/Language: Within normal  Attention: Within normal  Concentration: Brief  Thought Process: Within normal  Thought Content: Hallucinations: Within noraml  Delusions: Within normal  Orientation: X 3  Memory: No Evidence of Impairment  Judgement: No Evidence of Impairment  Estimated Intelligence: Average  Demonstrated Insight: Adequate  Fund of Knowledge: adequate       Suicidal/Homicidal Ideation present: None Reported This Session    Patient's impression of their current status: Patient presented for initial follow up session. He had a difficult mother's day as it was the first one since his mother passed away. He met with family at her grave. It was his first time there since the burial. He spent time processing his emotions and grief by writing a letter to his mom. He felt this was a way to give her recognition and honor her. He felt emotionally drained after writing the letter and took the next day for self care. His evening routine often consists of a few hours on the couch watching TV before bed. He has not been physically active. He notes often eating a frozen pizza for dinner. There continues to be family stress with his sister who has addiction concerns and is in inpatient  treatment. He share more about her history of lies and manipulation, which have been painful and difficult to forgive. He expressed that he doesn't want to talk much about this in session as it makes him angry to think about- there is a lot to repair within that relationship.    Therapist impression of patients current state: Patient presented on time for session. He comes from work. Mood was euthymic, with some sadness and depression present as he continues to grief the loss of his mother around mother's day.  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 He has a tendency to isolate at times, but does have some great social support especially through his nephews. He did reach out to them around mother's day to connect so that he wasn't alone- provided positive reinforcement for that. He does have some activities and social events in the next few weeks that he is looking forward to. He presents with future oriented thinking. Discussed the importance of an evening routine and good sleep hygiene. Discussed overall health and wellbeing and how mental health and physical health often impact each other. He has put his own health aside while he was caring for his parents. He is getting established with the diabetes educator in 2 weeks. Therapist and patient discussed goals for treatment as well as overall goals of managing diabetes, improving nutrition, physical activity, and getting a standing desk at work.     Type of psychotherapeutic technique provided: Insight oriented, Client centered, Solution-focused and motivational interviewing    Progress toward short term goals:Progress as expected, presented for initial follow up to develop therapeutic relationship and goals for treatment    Review of long term goals: Treatment Plan updated and Effective 05/17/2018-08/17/2018    Diagnosis:   1. Generalized anxiety disorder    2. Mild episode of recurrent major  depressive disorder    3. Moderate alcohol use disorder    4. Bereavement, uncomplicated       Plan and Follow up: Patient is scheduled for a follow up sessions on 5/31/2018, 6/18/18, 7/9/18, 7/23/18.      Discharge Criteria/Planning: Patient will continue with follow-up until therapy can be discontinued without return of signs and symptoms.    Aliciadebbie Cabrera [facility] 5/17/2018

## 2021-06-18 NOTE — PROGRESS NOTES
Assessment: Met with Grant today for the first time, new to [facility], dx with diabetes last summer, attended dm classes at [facility] at that time.  Comes in today for follow up education, has been caring for elderly parents over the last year and was not managing his dm well, no bg checks or attention to food choices or portions.     Instructed on basics of heart healthy eating using plate method, food models and serving dishes to illustrate cho foods, affect on bg and need for portion control and variety at meals. Instructed on cho counting and label reading for cho, fat and serving size, devised meal plan with 3-4 cho per meal. Provided meal plans for easy breakfast, lunch,dinner and snacks.  Instructed on need for meal consistency with portion control, variety and moderation.  Encouraged cooking on weekends for dinner and lunches during the week.  Receptive to education, verbalized understanding and motivated to implement needed changes to gain better control of bg and lose wt.    Recall indicates three meals per day, has been trying to make better choices more recently but still finds himself going to the vending machine for snacks and getting take out, frozen pizza more than he should, has tried to cut back to 2-3 times per week for take out.  Found above instruction helpful as well as meal planning and snack ideas, motivated and ready to make change, applauded motivation however this will take time to implement with success, small changes and steps will make the difference over time, Grant agreed.  Making intentional changes that he can maintain.    Started walking again and enjoys this, 20-45 minutes 2-3 days per week, would like to increase as able but understands just like change in eating habits must make slow but intentional changes.    Has not been checking bg but wants to start, will contact insurance company regarding what meter and contact CDE for RX. Has upcoming appt with Alicia where  meter instruction can take place or reviewed, Grant is confident he will be able to work meter without instruction.  Instructed on bg goals, AC and PC, log book provided. Taking medications without difficulty, no questions today.     Nice man who is motivated to make life style changes to improve physical and mental health.     Plan:  As above, follow up 7.2018    Subjective and Objective:      Grant Slaughter is referred by  for Diabetes Education.     Lab Results   Component Value Date    HGBA1C 7.0 (H) 03/29/2018     Current diabetes medications:    Metformin XR one 500 mg tablet two times a day      Goals       monitoring            Check bg once daily, rotate fasting and 2 PC. 5.2018            Follow up:   CDE (certified diabetic educator)      Education:     Monitoring   Meter (per above goals): Discussed and will purchase and start checking  Monitoring: Discussed and Literature provided  BG goals: Discussed and Literature provided    Nutrition Management  Nutrition Management: Assessed, Discussed and Literature provided  Weight: Assessed  Portions/Balance: Assessed, Discussed and Literature provided  Carb ID/Count: Assessed, Discussed and Literature provided  Label Reading: Assessed, Discussed and Literature provided  Heart Healthy Fats: Assessed, Discussed and Literature provided  Menu Planning: Assessed, Discussed and Literature provided  Dining Out: Discussed  Physical Activity: Assessed and Discussed  Medications: Assessed, Discussed and Competent  Orals: Competent    Diabetes Disease Process: Discussed    Acute Complications: Prevent, Detect, Treat:  Hypoglycemia: Assessed and Discussed  Hyperglycemia: Assessed, Discussed and Literature provided  Sick Days: Not addressed  Driving: Not addressed    Chronic Complications  Foot Care:Needs instruction/review at follow-up  Skin Care: Needs instruction/review at follow-up  Eye: Needs instruction/review at follow-up  ABC: Assessed and Discussed  Teeth:Needs  instruction/review at follow-up  Goal Setting and Problem Solving: Discussed  Barriers: Assessed  Psychosocial Adjustments: Assessed      Time spent with the patient: 60 minutes for diabetes education and counseling.   Previous Education: yes  Visit Type:MIAN Salcedo  5/30/2018

## 2021-06-18 NOTE — PROGRESS NOTES
Outpatient Mental Health Treatment Plan    Name:  Grant Slaughter  :  1970  MRN:  [id number]    Treatment Plan:  Initial Treatment Plan  Intake/initial treatment plan date:  2018  Benefit and risks and alternatives have been discussed: Yes  Is this treatment appropriate with minimal intrusion/restrictions: Yes  Estimated duration of treatment:  Approximately 20 sessions.  Anticipated frequency of services:  Every 1-2 weeks  Necessity for frequency: This frequency is needed to establish therapeutic goals and for continuity of care in order to monitor progress.  Necessity for treatment: To address cognitive, behavioral, and/or emotional barriers in order to work toward goals and to improve quality of life.    Plan:           ?   ? Anxiety    Goal:  Decrease average anxiety level from 3 to 1.   Strategies: ? [x]Learn and practice relaxation techniques and other coping strategies (e.g., thought stopping, reframing, meditation)     ? [x] Increase involvement in meaningful activities     ? [x] Discuss sleep hygiene     ? [x] Explore thoughts and expectations about self and others     ? [x] Identify and monitor triggers for panic/anxiety symptoms     ? [x] Implement physical activity routine (with physician approval)     ? [x] Consider introduction of bibliotherapy and/or videos     ? [x] Continue compliance with medical treatment plan (or explore barriers)                                  ?Degree to which this is a problem: 3  Degree to which goal is met: 1  Date of Review: 2018-2018       ? Depression    Goal:  Decrease average depression level from 3 to 1.   Strategies:    ?[x] Decrease social isolation     [x] Increase involvement in meaningful activities     ?[x] Discuss sleep hygiene     ?[x] Explore thoughts and expectations about self and others     ?[x] Process grief (loss of significant person, independence, role, etc.)     ?[x] Assess for suicide risk     ?[x] Implement physical activity  routine (with physician approval)     [x] Consider introduction of bibliotherapy and/or videos     [x] Continue compliance with medical treatment plan (or explore barriers)       ?  Degree to which this is a problem: 3  Degree to which goal is met: 1  Date of Review: 05/17/2018-08/17/2018      Other: Grief   Goal: Process the loss of his parents and return to stable level of functioning       Strategies:   - Express unresolved emotions regarding losses  - Display an understanding of the grief process and how this process may be exacerbated by or may exacerbate mental illness symptoms  - Develop an understanding of how avoidance of the grief process may affect functioning in all areas  - Develop alternative diversions and other coping mechanisms that are related to loss issues  - Redevelop a supportive social system and improve interpersonal relationships    Degree to which this is a problem: 4  Degree to which goal is met: 1  Date of Review: 05/17/2018-08/17/2018   ?          Substance use  Goal:  Decrease frequency and amount of alcohol use.   Strategies: ? [x] Consider referral for chemical dependency evaluation     ? [x] Discuss barriers to participating in AA or other peer-facilitated groups         [x] Address environmental factors which may interfere with sobriety     ? [x] Explore short-term versus long-term consequences of use     ? [x] Continue compliance with medical treatment plan (or explore barriers)     ?  Degree to which this is a problem: 3  Degree to which goal is met: 1  Date of Review: 05/17/2018-08/17/2018      Patient would benefit from continued psychotherapy services every 1-2weeks to further address presenting symptoms and concerns. Patient may also benefit from the following services and referrals:     Continue medication adherence    Mindfulness/relaxation techniques to decrease anxiety    CD support group/treatment    Grief/Loss group     *Patient identifies the following personal goals:  "Manage diabetes, cook more/eat less processed foods, walking & hiking more. He would like a standing desk at work to increase movement and help burn calories. He has been connected with the clinic diabetes educator for a future appointment. Dr. Carey is following up on request for standing desk at work.    Functional Impairment:  1=Not at all/Rarely  2=Some days  3=Most Days  4=Every Day    Personal : 4  Family : 4  Social : 3   Work/school : 3    Diagnosis   Major Depressive Disorder, recurrent, mild  Generalized Anxiety Disorder  Moderate Alcohol Use Disorder  Bereavement     Provisional Diagnosis   There is no provisional diagnosis for this patient as patient clearly meets DSM-5 criteria for the diagnosis listed above.     WHODAS 2.0 12-item version: Total = 4 or 8%      Scores presented in qualifiers to represent level of disability.  MILD Problem (slight, low, ...) 5-24%     H1= 30  H2= 0  H3= 2      Clinical assessments and measures completed:. SHARON-7, PHQ-9, Mood Questionnaire current, CAGE-AID and PANSI   PANSI: Positive Factors = 2.83 (<3.4 = high risk)               Negative Factors = 1 (>1.6 = high risk)               Indicates high risk for suicide.     PHQ-9: 4 . Difficulty with daily functioning= some .              Indicates mild severity.     SHARON-7: 8 . Difficulty with daily functioning= somewhat .               Indicates mild-moderate severity.      Mood: 1 total \"yes\". Problem severity =  somewhat.    CAGE: 0/4    Strengths:  \"Top performer at job.  Organizational. Caring, helping others. Kindness and selin.\"   Patient is open and engaged in session. He has good medication adherence.   Limitations: \"Don't have social energy. Having to push self out of my comfort zone now.\"  Patient put his own health and well-being aside while caring for his parents. Lacks insight into impact of alcohol use, which is likely being used as a coping mechanism to manage stressors/anxiety and depressed mood.  Cultural " Considerations: Patient is a  male who is 47 years old. He was born and raised in [place]. He is single with no children. He is the youngest child. Patient believes bad things happen in 3s. He learned this from his mother because of her losses. She would say this phrase based on her experiences. His 2 parents have  in the last year, so now he is waiting for the 3rd thing. He has found that being needed is satisfying, such as caring for his parents, as it feels good to be trusted.   Persons responsible for this plan: Patient, Provider and Other: Coordinate care with PCP as needed.            Psychotherapist Signature           Patient Signature:              Guardian Signature             Provider: Performed and documented by DEE Root   Date:  2018

## 2021-06-19 NOTE — PROGRESS NOTES
Mental Health Visit Note    7/23/2018    Start time: 3:08pm    Stop Time: 3:56pm   Session # 5    Grant Slaughter is a 47 y.o. male is being seen today for    Chief Complaint   Patient presents with     MH Follow Up     Patient presented for follow up psychotherapy visit to address symptoms of depression, anxiety and interpersonal stress.        New symptoms or complaints: None    Functional Impairment:   Personal: 3  Family: 3  Work: 3  Social: 2    Clinical assessment of mental status: reviewed 7/23/2018- current MSE  Grooming: Well groomed  Attire: Appropriate  Age: Appears Stated  Behavior Towards Examiner: Cooperative  Motor Activity: Within normal   Eye Contact: Appropriate  Mood: Euthymic  Affect: Congruent w/content of speech and Anxious  Speech/Language: Within normal  Attention: Within normal  Concentration: Within normal  Thought Process: Within normal  Thought Content: Hallucinations: Within noraml  Delusions: Within normal  Orientation: X 3  Memory: No Evidence of Impairment  Judgement: No Evidence of Impairment  Estimated Intelligence: Average  Demonstrated Insight: Adequate  Fund of Knowledge: adequate       Suicidal/Homicidal Ideation present: None Reported This Session    Patient's impression of their current status: Patient continues to be actively engaged at work and with his extended family. 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 He plans to continue the volunteer position. Time with his extended family went well. He continues to be very close with his nephew and wonders about moving to [place] to provide more help to the family. However, he wonders what kind of job he would find- perhaps a call center. He reports he wouldn't move until he receives foreclosure notice on his home, which is his father's who passed away. He has some anxiety knowing that the notice is coming, but unsure when. When he sees the information it the  "newspaper, it causes more anxiety. He shared more about his relationship with his sisters and his tendency to care for others.     Therapist impression of patients current state: Patient presented for follow up psychotherapy session. He arrives to session after work and is well groomed, alert and oriented. Patient developed insight into his thoughts about moving to [place] and assessed pros and cons. He expressed how his sister told him once that he \"didn't help enough.\" This was when she was SI/HI and it stuck in his mind ever since. Developed insight into how this has molded him into wanting to be a caregiver and support for others. He continues to think of that and take responsibility. He presents with some traits of co-dependence. Therapist also recommended an Alanon group or possibly a book he could read to provide additional information and support. Continued work on managing anxiety and assessing his strong identity as a caregiver would be beneficial. His depression appears to be well managed today.   Prior to next session he will continue to engage in further exploration of his own interest, needs and desires/goals may be beneficial.    Type of psychotherapeutic technique provided: Insight oriented, Client centered, CBT and motivational interviewing.    Progress toward short term goals:Progress as expected, did some processing and exploring about his own interests and needs, but would benefit from more. He continues to put his extended family's needs before his own.     Review of long term goals: Not done at today's visit and Effective 05/17/2018-08/17/2018.  Update at next session.    Diagnosis:   1. Generalized anxiety disorder    2. Mild episode of recurrent major depressive disorder (H)       Plan and Follow up: Patient is scheduled for a follow up sessions on 8/14/18.      Discharge Criteria/Planning: Patient will continue with follow-up until therapy can be discontinued without return of signs and " symptoms.    Alicia Cabrera [facility] 7/23/2018

## 2021-06-19 NOTE — PROGRESS NOTES
Outpatient Mental Health Treatment Plan     Name:  Grant Slaughter  :  1970  MRN:  [id number]     Treatment Plan: Updated Treatment Plan  Intake/initial treatment plan date:  2018. Updated 18.  Benefit and risks and alternatives have been discussed: Yes  Is this treatment appropriate with minimal intrusion/restrictions: Yes  Estimated duration of treatment:  Approximately 20 sessions.  Anticipated frequency of services:  Every 1-2 weeks  Necessity for frequency: This frequency is needed to establish therapeutic goals and for continuity of care in order to monitor progress.  Necessity for treatment: To address cognitive, behavioral, and/or emotional barriers in order to work toward goals and to improve quality of life.     Plan:                                                                                                                                          ?                        ? Anxiety                                            Goal:              Decrease average anxiety level from 2 to 1.                        Strategies:                 ? [x]Learn and practice relaxation techniques and other coping strategies (e.g., thought stopping, reframing, meditation)                                                                    ? [x] Increase involvement in meaningful activities                                                                    ? [x] Discuss sleep hygiene                                                                    ? [x] Explore thoughts and expectations about self and others                                                                    ? [x] Identify and monitor triggers for panic/anxiety symptoms                                                                    ? [x] Implement physical activity routine (with physician approval)                                                                    ? [x] Consider introduction of bibliotherapy and/or videos                                                                     ? [x] Continue compliance with medical treatment plan (or explore barriers)                              ?Degree to which this is a problem: 2  Degree to which goal is met: 1  Date of Review: 08/14/18-11/14/18                             ? Depression                                     Goal:              Decrease average depression level from 2 to 1.                        Strategies:                 ?[x] Decrease social isolation                                                                    [x] Increase involvement in meaningful activities                                                                    ?[x] Discuss sleep hygiene                                                                    ?[x] Explore thoughts and expectations about self and others                                                                    ?[x] Process grief (loss of significant person, independence, role, etc.)                                                                    ?[x] Assess for suicide risk                                                                    ?[x] Implement physical activity routine (with physician approval)                                                                    [x] Consider introduction of bibliotherapy and/or videos                                                                    [x] Continue compliance with medical treatment plan (or explore barriers)                                                                       ?  Degree to which this is a problem: 2  Degree to which goal is met: 1  Date of Review: 08/14/18-11/14/18        Other: Grief                Goal: Process the loss of his parents and return to stable level of functioning       Strategies:   - Express unresolved emotions regarding losses  - Display an understanding of the grief process and how this process may be exacerbated by or may exacerbate mental  illness symptoms  - Develop an understanding of how avoidance of the grief process may affect functioning in all areas  - Develop alternative diversions and other coping mechanisms that are related to loss issues  - Redevelop a supportive social system and improve interpersonal relationships     Degree to which this is a problem: 3  Degree to which goal is met: 1  Date of Review:     08/14/18-11/14/18                                                ?          Substance use  Goal:  Decrease frequency and amount of alcohol use.                        Strategies:                 ? [x] Consider referral for chemical dependency evaluation                                                                    ? [x] Discuss barriers to participating in AA or other peer-facilitated groups                                                                        [x] Address environmental factors which may interfere with sobriety                                                                    ? [x] Explore short-term versus long-term consequences of use                                                                    ? [x] Continue compliance with medical treatment plan (or explore barriers)                                                                    ?  Degree to which this is a problem: 2  Degree to which goal is met: 1  Date of Review: 08/14/18-11/14/18        Patient would benefit from continued psychotherapy services every 1-2weeks to further address presenting symptoms and concerns. Patient may also benefit from the following services and referrals:     Continue medication adherence    Mindfulness/relaxation techniques to decrease anxiety    CD support group/treatment    Grief/Loss group      *Patient identifies the following personal goals: Manage diabetes, cook more/eat less processed foods, walking & hiking more. He would like a standing desk at work to increase movement and help burn calories. He has been  "connected with the clinic diabetes educator for a future appointment. Dr. Carey is following up on request for standing desk at work.     Functional Impairment:  1=Not at all/Rarely  2=Some days  3=Most Days  4=Every Day    Personal : 4  Family : 4  Social : 3   Work/school : 3     Diagnosis   Major Depressive Disorder, recurrent, mild  Generalized Anxiety Disorder  Moderate Alcohol Use Disorder  Bereavement      Provisional Diagnosis   There is no provisional diagnosis for this patient as patient clearly meets DSM-5 criteria for the diagnosis listed above.      WHODAS 2.0 12-item version: Total = 4 or 8%       Scores presented in qualifiers to represent level of disability.  MILD Problem (slight, low, ...) 5-24%      H1= 30  H2= 0  H3= 2        Clinical assessments and measures completed:. SHARON-7, PHQ-9, Mood Questionnaire current, CAGE-AID and PANSI   PANSI: Positive Factors = 2.83 (<3.4 = high risk)               Negative Factors = 1 (>1.6 = high risk)               Indicates high risk for suicide.      PHQ-9: 4 . Difficulty with daily functioning= some .              Indicates mild severity.      SHARON-7: 8 . Difficulty with daily functioning= somewhat .               Indicates mild-moderate severity.       Mood: 1 total \"yes\". Problem severity =  somewhat.     CAGE: 0/4     Strengths:  \"Top performer at job.  Organizational. Caring, helping others. Kindness and selin.\"   Patient is open and engaged in session. He has good medication adherence.   Limitations: \"Don't have social energy. Having to push self out of my comfort zone now.\"  Patient put his own health and well-being aside while caring for his parents. Lacks insight into impact of alcohol use, which is likely being used as a coping mechanism to manage stressors/anxiety and depressed mood.  Cultural Considerations: Patient is a  male who is 47 years old. He was born and raised in [place]. He is single with no children. He is the youngest child. Patient " believes bad things happen in 3s. He learned this from his mother because of her losses. She would say this phrase based on her experiences. His 2 parents have  in the last year, so now he is waiting for the 3rd thing. He has found that being needed is satisfying, such as caring for his parents, as it feels good to be trusted.   Persons responsible for this plan: Patient, Provider and Other: Coordinate care with PCP as needed.         Psychotherapist Signature           Patient Signature:              Guardian Signature             Provider: Performed and documented by DEE Root   Date:  2018

## 2021-06-19 NOTE — LETTER
Letter by Anthony Temple MD at      Author: Anthony Temple MD Service: -- Author Type: --    Filed:  Encounter Date: 4/2/2019 Status: (Other)         April 2, 2019     Patient: Grant Slaughter   YOB: 1970   Date of Visit: 4/2/2019       To Whom It May Concern:    It is my medical opinion that Grant Slaughter may return to work on 4/4/19.    Please excuse patient for missing work on 4/2/19 and 4/3/19 due to his medical condition.     If you have any questions or concerns, please don't hesitate to call.    Sincerely,        Electronically signed by Anthony Temple MD

## 2021-06-19 NOTE — LETTER
Letter by Martinez Carey MD at      Author: Martinez Carey MD Service: -- Author Type: --    Filed:  Encounter Date: 4/5/2019 Status: (Other)         Grant Slaughter  [address]  [place] [address]             April 5, 2019        Dear Mr. Slaughter,    Below are the results from your recent visit:    Resulted Orders   LDL Cholesterol, Direct   Result Value Ref Range    Direct LDL 77 <=129 mg/dl   Comprehensive Metabolic Panel   Result Value Ref Range    Sodium 138 136 - 145 mmol/L    Potassium 4.0 3.5 - 5.0 mmol/L    Chloride 100 98 - 107 mmol/L    CO2 28 22 - 31 mmol/L    Anion Gap, Calculation 10 5 - 18 mmol/L    Glucose 189 (H) 70 - 125 mg/dL    BUN 14 8 - 22 mg/dL    Creatinine 0.78 0.70 - 1.30 mg/dL    GFR MDRD Af Amer >60 >60 mL/min/1.73m2    GFR MDRD Non Af Amer >60 >60 mL/min/1.73m2    Bilirubin, Total 0.4 0.0 - 1.0 mg/dL    Calcium 9.9 8.5 - 10.5 mg/dL    Protein, Total 7.6 6.0 - 8.0 g/dL    Albumin 3.7 3.5 - 5.0 g/dL    Alkaline Phosphatase 74 45 - 120 U/L    AST 30 0 - 40 U/L    ALT 49 (H) 0 - 45 U/L    Narrative    Fasting Glucose reference range is 70-99 mg/dL per  American Diabetes Association (ADA) guidelines.   Glycosylated Hemoglobin A1c   Result Value Ref Range    Hemoglobin A1c 6.7 (H) 3.5 - 6.0 %       Numbers are good except for a minimal elevation in the liver test.  This could be from alcohol or the cholesterol med.  No concern if stable.  Usual visit.   Or earlier if symptoms    Please call with questions or contact us using [employer].    Sincerely,        Electronically signed by Martinez Carey MD

## 2021-06-19 NOTE — PROGRESS NOTES
Mental Health Visit Note    8/14/2018    Start time: 4:05pm    Stop Time: 4:50pm   Session # 6    Grant Slaughter is a 47 y.o. male is being seen today for    Chief Complaint   Patient presents with     MH Follow Up     Patient presented for follow up psychotherapy to address symptosm of depression, anxiety and grief/loss      Session Type: Patient is presenting for an Individual session.      New symptoms or complaints: None    Functional Impairment:   Personal: 3  Family: 3  Work: 3  Social: 2    Clinical assessment of mental status: Current MSE effective 8/14/18  Grooming: Well groomed  Attire: Appropriate  Age: Appears Stated  Behavior Towards Examiner: Cooperative  Motor Activity: Within normal   Eye Contact: Appropriate  Mood: Euthymic  Affect: Congruent w/content of speech and Anxious  Speech/Language: Within normal  Attention: Within normal  Concentration: Within normal  Thought Process: Within normal  Thought Content: Hallucinations: Within noraml  Delusions: Within normal  Orientation: X 3  Memory: No Evidence of Impairment  Judgement: No Evidence of Impairment  Estimated Intelligence: Average  Demonstrated Insight: Adequate  Fund of Knowledge: adequate       Suicidal/Homicidal Ideation present: None Reported This Session    Patient's impression of their current status: Patient continues to spend a lot of time with his family. He endorses anxiety about receiving foreclosure notice and will need to move by April. He is not sure where he will live. He reports feeling stressed about going through his parents things in the house prior to the foreclosure. He continues to function well at work, but does endorse stress at work at times. 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     Therapist impression of patients current state: Patient presented for follow up psychotherapy session. He continues to present with symptoms of anxiety and depression, but therapist is noticing improvements. He continues to work on  engaging in more physical activity and management of health conditions such as diabetes. Therapist and patient reviewed and updated treatment plan together in session. Encouraged continued implementation of healthy lifestyle changes to improve overall health and well-being and manage anxiety and depression.      Type of psychotherapeutic technique provided: Insight oriented, Client centered, CBT and motivational interviewing.    Progress toward short term goals:Progress as expected, has been engaging in interests and quality time with family.     Review of long term goals: Treatment Plan updated and Effective 8/14/18-11/14/18.    Diagnosis:   1. Generalized anxiety disorder    2. Mild episode of recurrent major depressive disorder (H)       Plan and Follow up: Patient is scheduled for a follow up sessions on 9/11/18.      Discharge Criteria/Planning: Patient will continue with follow-up until therapy can be discontinued without return of signs and symptoms.    Alicia Cabrera [facility] 8/14/2018

## 2021-06-19 NOTE — LETTER
Letter by Martinez Carey MD at      Author: Martinez Carey MD Service: -- Author Type: --    Filed:  Encounter Date: 4/5/2019 Status: (Other)         Grant Slaughter  [address]  [place] [address]             April 5, 2019        Dear Mr. Slaughter,    Below are the results from your recent visit:    Resulted Orders   LDL Cholesterol, Direct   Result Value Ref Range    Direct LDL 77 <=129 mg/dl   Comprehensive Metabolic Panel   Result Value Ref Range    Sodium 138 136 - 145 mmol/L    Potassium 4.0 3.5 - 5.0 mmol/L    Chloride 100 98 - 107 mmol/L    CO2 28 22 - 31 mmol/L    Anion Gap, Calculation 10 5 - 18 mmol/L    Glucose 189 (H) 70 - 125 mg/dL    BUN 14 8 - 22 mg/dL    Creatinine 0.78 0.70 - 1.30 mg/dL    GFR MDRD Af Amer >60 >60 mL/min/1.73m2    GFR MDRD Non Af Amer >60 >60 mL/min/1.73m2    Bilirubin, Total 0.4 0.0 - 1.0 mg/dL    Calcium 9.9 8.5 - 10.5 mg/dL    Protein, Total 7.6 6.0 - 8.0 g/dL    Albumin 3.7 3.5 - 5.0 g/dL    Alkaline Phosphatase 74 45 - 120 U/L    AST 30 0 - 40 U/L    ALT 49 (H) 0 - 45 U/L    Narrative    Fasting Glucose reference range is 70-99 mg/dL per  American Diabetes Association (ADA) guidelines.   Glycosylated Hemoglobin A1c   Result Value Ref Range    Hemoglobin A1c 6.7 (H) 3.5 - 6.0 %       All test results are normal or not clinically relevant.      Please call with questions or contact us using [employer].    Sincerely,        Electronically signed by Martinez Carey MD

## 2021-06-19 NOTE — PROGRESS NOTES
Mental Health Visit Note    7/9/2018    Start time: 3:10pm    Stop Time: 4:10pm   Session # 4    Grant Slaughter is a 47 y.o. male is being seen today for    Chief Complaint   Patient presents with     MH Follow Up     Patient presented for follow up psychotherapy visit to address symptoms of depression and grief/loss        New symptoms or complaints: Interpersonal stress with sister. Anniversary of mother's death.     Functional Impairment:   Personal: 3  Family: 3  Work: 3  Social: 2    Clinical assessment of mental status:   Grooming: Well groomed  Attire: Appropriate  Age: Appears Stated  Behavior Towards Examiner: Cooperative  Motor Activity: Within normal   Eye Contact: Appropriate  Mood: Euthymic  Affect: Congruent w/content of speech and Anxious  Speech/Language: Within normal  Attention: Within normal  Concentration: Within normal  Thought Process: Within normal  Thought Content: Hallucinations: Within noraml  Delusions: Within normal  Orientation: X 3  Memory: No Evidence of Impairment  Judgement: No Evidence of Impairment  Estimated Intelligence: Average  Demonstrated Insight: Adequate  Fund of Knowledge: adequate       Suicidal/Homicidal Ideation present: None Reported This Session    Patient's impression of their current status: Patient spent time with his family in [place] over the last weekend. July 5th was the anniversary of his mother's death and he coped well. He had a dream that his mother and father were still alive. He shared more about his feelings leading up to the death of his parents (chaotic) and his feelings upon their passing (relief/free from pain). 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 He has a family member who is he meeting for the first time this weekend and he is anxious about it. He reports good nutrition, exercising 3x/week and drinking twice in the last week. He was informed that his sister left CD  treatment and expresses frustration and concern for her.    Therapist impression of patients current state: Patient presented for follow up psychotherapy session. He is open and engaged in session. His mood is euthymic, but he addresses some worries today. We developed insight into how his interpersonal relationship with his sister causes more problems/stress than the loss of his parents. He has a tendency to want to avoid talking about her because he feels so strongly and is easily irritated/hurt by her past actions.  Developed insight into his desire to be in the caregiver role. For example, he noted he is considering moving to [place] to help family out with their children. He is not sure if he would be happy there, but believes they would benefit from the help. Prior to next session, encouraged further insight development into this would be helpful as he tends to put others first before himself. Further exploration of his own interest, needs and desires/goals may be beneficial.    Type of psychotherapeutic technique provided: Insight oriented, Client centered, CBT and motivational interviewing    Progress toward short term goals:Progress as expected, coped well with the anniversary of his mother's death. Utilizing support of family and socializing a lot with them. Functioning well at work. Taking medications. Decrease in alcohol use. Exercised 3 days/week.    Review of long term goals: Not done at today's visit and Effective 05/17/2018-08/17/2018.     Diagnosis:   1. Generalized anxiety disorder    2. Mild episode of recurrent major depressive disorder (H)       Plan and Follow up: Patient is scheduled for a follow up sessions on 7/23/18.      Discharge Criteria/Planning: Patient will continue with follow-up until therapy can be discontinued without return of signs and symptoms.    Alicia Cabrera [facility] 7/9/2018

## 2021-06-19 NOTE — LETTER
Letter by Martinez Carey MD at      Author: Martinez Carey MD Service: -- Author Type: --    Filed:  Encounter Date: 10/17/2019 Status: Signed         Grant [address]  [place] [address]             October 17, 2019        Dear Mr. Slaughter,    Below are the results from your recent visit:    Resulted Orders   ALT (SGPT)   Result Value Ref Range    ALT 52 (H) 0 - 45 U/L   Glycosylated Hemoglobin A1c   Result Value Ref Range    Hemoglobin A1c 6.8 (H) 3.5 - 6.0 %   Microalbumin, Random Urine   Result Value Ref Range    Microalbumin, Random Urine 1.39 0.00 - 1.99 mg/dL    Creatinine, Urine 113.6 mg/dL    Microalbumin/Creatinine Ratio Random Urine 12.2 <=19.9 mg/g    Narrative    Microalbumin, Random Urine  <2.0 mg/dL . . . . . . . . Normal  3.0-30.0 mg/dL . . . . . . Microalbuminuria  >30.0 mg/dL . . . . . .  . Clinical Proteinuria    Microalbumin/Creatinine Ratio, Random Urine  <20 mg/g . . . . .. . . . Normal   mg/g . . . . . . . Microalbuminuria  >300 mg/g . . . . . . . . Clinical Proteinuria           Lab results are stable.  The minor liver abnormality is likely from alcohol.  Try to decrease.  Check in 3 months      Please call with questions or contact us using [employer].    Sincerely,        Electronically signed by Martinez Carey MD

## 2021-06-19 NOTE — PROGRESS NOTES
Assessment: Follow up with Grant, comes in without meter, has not been checking bg.  Last A1c noted at 7.0, recheck today 7.2.     Current dm medication:  Metformin XR two 500 mg tablets daily    A1c 7.2 today, 7.0  3.2018    No bg checks to report.    Recall indicates more cooking at home, bring lunch to work, less vending machine snacks and less fast food, dining out/frozen pizza.  Cooks on the weekend and tries to make enough for lunches 2-3 days per week.   However finds himself snacking more and not really eating lunch.   Eating vege, fairly large serving in the AM, finds he is not hungry at lunch, popcorn in the afternoon and dinner when he gets home. Does admit to eating more at dinner, finds himself quite hungry due to little food/meals at work.    Devised simple meals plans for small meals/snacks at work to help reduce portions at dinner. Grant is in agreement and would like to decrease intake at dinner, believes this in addition to less walking may be why he has not seen wt loss.  Wt is stable however given above and walking 2-3 days per week we should see some wt loss.      Walking 2-3 days per week but duration is not long, does not always have the time, working longer days to get off early on fridays. Encouraged walking longer distance on weekend and continue shorter walks during the week, will consider this.     No interest in checking bg at this time, A1c note and with more exercise and more consistent eating during the day and less at dinner, we should see improvement.     Plan:  Follow up 1.2019    Subjective and Objective:      Grant WHALEY Kasandra is referred by  for Diabetes Education.     Lab Results   Component Value Date    HGBA1C 7.2 (H) 08/01/2018       Current diabetes medications:    Metformin XR two 500 mg tablets daily      Goals       monitoring            Check bg once daily, rotate fasting and 2 PC. 5.2018  NOT MET        nutrition            1. Read label for serving size and cho  content  Sometimes  2. Measure foods sometimes  3. Cook ahead on weekends MET  4. Limit fast food and/or frozen pizza to 2 times per week. 5.2018  MET            Follow up:   CDE (certified diabetic educator)      Education:     Monitoring not checking bg  Meter (per above goals): Not addressed  Monitoring: Discussed  BG goals: Not addressed    Nutrition Management  Nutrition Management: Assessed and Discussed  Weight: Assessed, discussed  Portions/Balance: Assessed and Discussed  Carb ID/Count: Competent  Label Reading: Assessed and Discussed  Heart Healthy Fats: Discussed  Menu Planning: Assessed and Discussed  Dining Out: Discussed  Physical Activity: Assessed and Discussed  Medications: Assessed, Discussed and Competent  Orals: Competent    Acute Complications: Prevent, Detect, Treat:  Hypoglycemia: Assessed  Hyperglycemia: Assessed and Discussed  Sick Days: Not addressed    Chronic Complications  Foot Care:Discussed  Skin Care: Discussed  Eye: Discussed and Competent  ABC: Assessed and Discussed  Teeth:Discussed and Competent  Goal Setting and Problem Solving: Discussed  Barriers: Assessed  Psychosocial Adjustments: Assessed      Time spent with the patient: 60 minutes for diabetes education and counseling.   Previous Education: yes  Visit Type:DSMT        Bharti Salcedo  8/1/2018

## 2021-06-20 NOTE — LETTER
Letter by Martinez Carey MD at      Author: Martinez Carey MD Service: -- Author Type: --    Filed:  Encounter Date: 4/8/2020 Status: (Other)         Grant Slaughter  [address]  [place] [address]             April 8, 2020        Dear Mr. Slaughter,    Below are the results from your recent visit:    Resulted Orders   LDL Cholesterol, Direct   Result Value Ref Range    Direct LDL 99 <=129 mg/dl   Comprehensive Metabolic Panel   Result Value Ref Range    Sodium 142 136 - 145 mmol/L    Potassium 4.1 3.5 - 5.0 mmol/L    Chloride 103 98 - 107 mmol/L    CO2 27 22 - 31 mmol/L    Anion Gap, Calculation 12 5 - 18 mmol/L    Glucose 179 (H) 70 - 125 mg/dL    BUN 16 8 - 22 mg/dL    Creatinine 0.78 0.70 - 1.30 mg/dL    GFR MDRD Af Amer >60 >60 mL/min/1.73m2    GFR MDRD Non Af Amer >60 >60 mL/min/1.73m2    Bilirubin, Total 0.3 0.0 - 1.0 mg/dL    Calcium 9.4 8.5 - 10.5 mg/dL    Protein, Total 7.5 6.0 - 8.0 g/dL    Albumin 3.8 3.5 - 5.0 g/dL    Alkaline Phosphatase 76 45 - 120 U/L    AST 23 0 - 40 U/L    ALT 39 0 - 45 U/L    Narrative    Fasting Glucose reference range is 70-99 mg/dL per  American Diabetes Association (ADA) guidelines.   Glycosylated Hemoglobin A1c   Result Value Ref Range    Hemoglobin A1c 7.0 (H) 3.5 - 6.0 %       All test results are good.  Check in four to six months      Please call with questions or contact us using [employer].    Sincerely,        Electronically signed by Martinez Carey MD

## 2021-06-23 ENCOUNTER — COMMUNICATION - HEALTHEAST (OUTPATIENT)
Dept: FAMILY MEDICINE | Facility: CLINIC | Age: 51
End: 2021-06-23

## 2021-06-23 DIAGNOSIS — I10 HYPERTENSION GOAL BP (BLOOD PRESSURE) < 140/90: ICD-10-CM

## 2021-06-23 RX ORDER — LOSARTAN POTASSIUM 100 MG/1
100 TABLET ORAL DAILY
Qty: 90 TABLET | Refills: 3 | Status: SHIPPED | OUTPATIENT
Start: 2021-06-23 | End: 2023-03-01

## 2021-06-23 RX ORDER — HYDROCHLOROTHIAZIDE 25 MG/1
25 TABLET ORAL DAILY
Qty: 90 TABLET | Refills: 3 | Status: SHIPPED | OUTPATIENT
Start: 2021-06-23 | End: 2022-05-03

## 2021-06-25 NOTE — TELEPHONE ENCOUNTER
Former patient of ??? & has not established care with another provider.  Please assign refill request to covering provider per Clinic standard process.      RN cannot approve Refill Request    RN can NOT refill this medication no pcp. Last office visit: 4/4/2019 Martinez Carey MD Last Physical: 10/16/2019 Last MTM visit: Visit date not found Last visit same specialty: 4/16/2021 Clifford Evans MD.  Next visit within 3 mo: Visit date not found  Next physical within 3 mo: Visit date not found      Edgardo Bishop, [facility] Triage/Med Refill 6/10/2021    Requested Prescriptions   Pending Prescriptions Disp Refills     metFORMIN (GLUCOPHAGE-XR) 500 MG 24 hr tablet [Pharmacy Med Name: METFORMIN HCL  MG TABLET] 180 tablet 3     Sig: TAKE 1 TABLET BY MOUTH TWICE A DAY       Metformin Refill Protocol Passed - 6/9/2021  5:15 PM        Passed - Blood pressure in last 12 months     BP Readings from Last 1 Encounters:   04/16/21 139/89             Passed - LFT or AST or ALT in last 12 months     Albumin   Date Value Ref Range Status   04/16/2021 4.1 3.5 - 5.0 g/dL Final     Bilirubin, Total   Date Value Ref Range Status   04/16/2021 0.6 0.0 - 1.0 mg/dL Final     Alkaline Phosphatase   Date Value Ref Range Status   04/16/2021 77 45 - 120 U/L Final     AST   Date Value Ref Range Status   04/16/2021 37 0 - 40 U/L Final     ALT   Date Value Ref Range Status   04/16/2021 59 (H) 0 - 45 U/L Final     Protein, Total   Date Value Ref Range Status   04/16/2021 7.7 6.0 - 8.0 g/dL Final                Passed - GFR or Serum Creatinine in last 6 months     GFR MDRD Non Af Amer   Date Value Ref Range Status   04/16/2021 >60 >60 mL/min/1.73m2 Final     GFR MDRD Af Amer   Date Value Ref Range Status   04/16/2021 >60 >60 mL/min/1.73m2 Final             Passed - Visit with PCP or prescribing provider visit in last 6 months or next 3 months     Last office visit with prescriber/PCP: Visit date not found OR same dept: 4/16/2021  Clifford Evans MD OR same specialty: 4/16/2021 Clifford Evans MD Last physical: Visit date not found Last MTM visit: Visit date not found         Next appt within 3 mo: Visit date not found  Next physical within 3 mo: Visit date not found  Prescriber OR PCP: Martinez Carey MD  Last diagnosis associated with med order: 1. Type 2 diabetes mellitus without complication (H)  - metFORMIN (GLUCOPHAGE-XR) 500 MG 24 hr tablet [Pharmacy Med Name: METFORMIN HCL  MG TABLET]; TAKE 1 TABLET BY MOUTH TWICE A DAY  Dispense: 180 tablet; Refill: 3     If protocol passes may refill for 12 months if within 3 months of last provider visit (or a total of 15 months).           Passed - A1C in last 6 months     Hemoglobin A1c   Date Value Ref Range Status   04/16/2021 7.1 (H) <=5.6 % Final               Passed - Microalbumin in last year      Microalbumin, Random Urine   Date Value Ref Range Status   04/16/2021 2.36 (H) 0.00 - 1.99 mg/dL Final

## 2021-06-26 NOTE — TELEPHONE ENCOUNTER
Refills Approved x 2    Rx renewed per Medication Renewal Policy. Medications were both last renewed on 4/8/2020 with 3 refills each.  Last office visit: 4/16/2021 establishing care with PCP Dr KULWINDER Evans.     Yin Parikh, [facility] Triage/Med Refill 6/23/2021     Requested Prescriptions   Pending Prescriptions Disp Refills     losartan (COZAAR) 100 MG tablet [Pharmacy Med Name: LOSARTAN POTASSIUM 100 MG TAB] 90 tablet 3     Sig: TAKE 1 TABLET BY MOUTH EVERY DAY       Angiotensin Receptor Blocker Protocol Passed - 6/23/2021 12:18 AM        Passed - PCP or prescribing provider visit in past 12 months       Last office visit with prescriber/PCP: 4/4/2019 Martinez Carey MD OR same dept: 4/16/2021 Clifford Evans MD OR same specialty: 4/16/2021 Clifford Evans MD  Last physical: 10/16/2019 Last MTM visit: Visit date not found   Next visit within 3 mo: Visit date not found  Next physical within 3 mo: Visit date not found  Prescriber OR PCP: Martinez Carey MD  Last diagnosis associated with med order: 1. Hypertension goal BP (blood pressure) < 140/90  - losartan (COZAAR) 100 MG tablet [Pharmacy Med Name: LOSARTAN POTASSIUM 100 MG TAB]; TAKE 1 TABLET BY MOUTH EVERY DAY  Dispense: 90 tablet; Refill: 3  - hydroCHLOROthiazide (HYDRODIURIL) 25 MG tablet [Pharmacy Med Name: HYDROCHLOROTHIAZIDE 25 MG TAB]; TAKE 1 TABLET BY MOUTH EVERY DAY  Dispense: 90 tablet; Refill: 3    If protocol passes may refill for 12 months if within 3 months of last provider visit (or a total of 15 months).             Passed - Serum potassium within the past 12 months     Lab Results   Component Value Date    Potassium 3.8 04/16/2021             Passed - Blood pressure filed in past 12 months     BP Readings from Last 1 Encounters:   04/16/21 139/89             Passed - Serum creatinine within the past 12 months     Creatinine   Date Value Ref Range Status   04/16/2021 0.74 0.70 - 1.30 mg/dL Final                hydroCHLOROthiazide  (HYDRODIURIL) 25 MG tablet [Pharmacy Med Name: HYDROCHLOROTHIAZIDE 25 MG TAB] 90 tablet 3     Sig: TAKE 1 TABLET BY MOUTH EVERY DAY       Diuretics/Combination Diuretics Refill Protocol  Passed - 6/23/2021 12:18 AM        Passed - Visit with PCP or prescribing provider visit in past 12 months     Last office visit with prescriber/PCP: 4/4/2019 Martinez Carey MD OR same dept: 4/16/2021 Clifford Evans MD OR same specialty: 4/16/2021 Clifford Evans MD  Last physical: 10/16/2019 Last MTM visit: Visit date not found   Next visit within 3 mo: Visit date not found  Next physical within 3 mo: Visit date not found  Prescriber OR PCP: Martinez Carey MD  Last diagnosis associated with med order: 1. Hypertension goal BP (blood pressure) < 140/90  - losartan (COZAAR) 100 MG tablet [Pharmacy Med Name: LOSARTAN POTASSIUM 100 MG TAB]; TAKE 1 TABLET BY MOUTH EVERY DAY  Dispense: 90 tablet; Refill: 3  - hydroCHLOROthiazide (HYDRODIURIL) 25 MG tablet [Pharmacy Med Name: HYDROCHLOROTHIAZIDE 25 MG TAB]; TAKE 1 TABLET BY MOUTH EVERY DAY  Dispense: 90 tablet; Refill: 3    If protocol passes may refill for 12 months if within 3 months of last provider visit (or a total of 15 months).             Passed - Serum Potassium in past 12 months      Lab Results   Component Value Date    Potassium 3.8 04/16/2021             Passed - Serum Sodium in past 12 months      Lab Results   Component Value Date    Sodium 138 04/16/2021             Passed - Blood pressure on file in past 12 months     BP Readings from Last 1 Encounters:   04/16/21 139/89             Passed - Serum Creatinine in past 12 months      Creatinine   Date Value Ref Range Status   04/16/2021 0.74 0.70 - 1.30 mg/dL Final

## 2021-09-19 ENCOUNTER — HEALTH MAINTENANCE LETTER (OUTPATIENT)
Age: 51
End: 2021-09-19

## 2021-11-14 ENCOUNTER — HEALTH MAINTENANCE LETTER (OUTPATIENT)
Age: 51
End: 2021-11-14

## 2022-03-07 ENCOUNTER — E-VISIT (OUTPATIENT)
Dept: URGENT CARE | Facility: CLINIC | Age: 52
End: 2022-03-07
Payer: COMMERCIAL

## 2022-04-11 DIAGNOSIS — I10 HYPERTENSION GOAL BP (BLOOD PRESSURE) < 140/90: ICD-10-CM

## 2022-04-11 DIAGNOSIS — F43.23 ADJUSTMENT DISORDER WITH MIXED ANXIETY AND DEPRESSED MOOD: ICD-10-CM

## 2022-04-11 DIAGNOSIS — Z76.0 ENCOUNTER FOR MEDICATION REFILL: Primary | ICD-10-CM

## 2022-04-11 DIAGNOSIS — E78.5 HYPERLIPIDEMIA: ICD-10-CM

## 2022-04-14 RX ORDER — CITALOPRAM HYDROBROMIDE 20 MG/1
TABLET ORAL
Qty: 90 TABLET | Refills: 3 | Status: SHIPPED | OUTPATIENT
Start: 2022-04-14 | End: 2024-04-30

## 2022-04-14 RX ORDER — ATORVASTATIN CALCIUM 20 MG/1
TABLET, FILM COATED ORAL
Qty: 90 TABLET | Refills: 3 | Status: SHIPPED | OUTPATIENT
Start: 2022-04-14 | End: 2023-02-13

## 2022-04-14 RX ORDER — AMLODIPINE BESYLATE 5 MG/1
TABLET ORAL
Qty: 90 TABLET | Refills: 0 | Status: SHIPPED | OUTPATIENT
Start: 2022-04-14 | End: 2022-05-03

## 2022-04-14 NOTE — TELEPHONE ENCOUNTER
"Former patient of ??? & has not established care with another provider.  Please assign refill request to covering provider per clinic standard process.    Routing refill request to provider for review/approval because:  Labs not current:  LDL  PHQ 9 score - not on file/out of date.  No PCP    Last Written Prescription Date:  4/20/21  Last Fill Quantity: 90,  # refills: 3   Last office visit provider:  4/16/21     Requested Prescriptions   Pending Prescriptions Disp Refills     atorvastatin (LIPITOR) 20 MG tablet 90 tablet 3       Statins Protocol Failed - 4/13/2022  1:59 PM        Failed - LDL on file in past 12 months     Recent Labs   Lab Test 04/07/20  1257   LDL 99             Passed - No abnormal creatine kinase in past 12 months     No lab results found.             Passed - Recent (12 mo) or future (30 days) visit within the authorizing provider's specialty     Patient has had an office visit with the authorizing provider or a provider within the authorizing providers department within the previous 12 mos or has a future within next 30 days. See \"Patient Info\" tab in inbasket, or \"Choose Columns\" in Meds & Orders section of the refill encounter.              Passed - Medication is active on med list        Passed - Patient is age 18 or older           citalopram (CELEXA) 20 MG tablet 90 tablet 3       SSRIs Protocol Failed - 4/13/2022  1:59 PM        Failed - PHQ-9 score less than 5 in past 6 months     Please review last PHQ-9 score.           Failed - Recent (6 mo) or future (30 days) visit within the authorizing provider's specialty     Patient had office visit in the last 6 months or has a visit in the next 30 days with authorizing provider or within the authorizing provider's specialty.  See \"Patient Info\" tab in inbasket, or \"Choose Columns\" in Meds & Orders section of the refill encounter.            Passed - Medication is active on med list        Passed - Patient is age 18 or older         Signed " "Prescriptions Disp Refills    amLODIPine (NORVASC) 5 MG tablet 90 tablet 0     Sig: [AMLODIPINE (NORVASC) 5 MG TABLET] TAKE 1 TABLET BY MOUTH EVERY DAY       Calcium Channel Blockers Protocol  Passed - 4/13/2022  1:59 PM        Passed - Blood pressure under 140/90 in past 12 months     BP Readings from Last 3 Encounters:   04/16/21 139/89   03/15/20 106/75   10/16/19 134/78                 Passed - Recent (12 mo) or future (30 days) visit within the authorizing provider's specialty     Patient has had an office visit with the authorizing provider or a provider within the authorizing providers department within the previous 12 mos or has a future within next 30 days. See \"Patient Info\" tab in inbasket, or \"Choose Columns\" in Meds & Orders section of the refill encounter.              Passed - Medication is active on med list        Passed - Patient is age 18 or older        Passed - Normal serum creatinine on file in past 12 months     Recent Labs   Lab Test 04/16/21  1457   CR 0.74       Ok to refill medication if creatinine is low               Edgardo Bishop RN 04/14/22 10:52 AM  "

## 2022-04-14 NOTE — TELEPHONE ENCOUNTER
"Last Written Prescription Date:  4/20/21  Last Fill Quantity: 90,  # refills: 3   Last office visit provider:  4/16/21     Requested Prescriptions   Pending Prescriptions Disp Refills     atorvastatin (LIPITOR) 20 MG tablet 90 tablet 3       Statins Protocol Failed - 4/13/2022  1:59 PM        Failed - LDL on file in past 12 months     Recent Labs   Lab Test 04/07/20  1257   LDL 99             Passed - No abnormal creatine kinase in past 12 months     No lab results found.             Passed - Recent (12 mo) or future (30 days) visit within the authorizing provider's specialty     Patient has had an office visit with the authorizing provider or a provider within the authorizing providers department within the previous 12 mos or has a future within next 30 days. See \"Patient Info\" tab in inbasket, or \"Choose Columns\" in Meds & Orders section of the refill encounter.              Passed - Medication is active on med list        Passed - Patient is age 18 or older           amLODIPine (NORVASC) 5 MG tablet 90 tablet 3       Calcium Channel Blockers Protocol  Passed - 4/13/2022  1:59 PM        Passed - Blood pressure under 140/90 in past 12 months     BP Readings from Last 3 Encounters:   04/16/21 139/89   03/15/20 106/75   10/16/19 134/78                 Passed - Recent (12 mo) or future (30 days) visit within the authorizing provider's specialty     Patient has had an office visit with the authorizing provider or a provider within the authorizing providers department within the previous 12 mos or has a future within next 30 days. See \"Patient Info\" tab in inbasket, or \"Choose Columns\" in Meds & Orders section of the refill encounter.              Passed - Medication is active on med list        Passed - Patient is age 18 or older        Passed - Normal serum creatinine on file in past 12 months     Recent Labs   Lab Test 04/16/21  1457   CR 0.74       Ok to refill medication if creatinine is low             citalopram " "(CELEXA) 20 MG tablet 90 tablet 3       SSRIs Protocol Failed - 4/13/2022  1:59 PM        Failed - PHQ-9 score less than 5 in past 6 months     Please review last PHQ-9 score.           Failed - Recent (6 mo) or future (30 days) visit within the authorizing provider's specialty     Patient had office visit in the last 6 months or has a visit in the next 30 days with authorizing provider or within the authorizing provider's specialty.  See \"Patient Info\" tab in inbasket, or \"Choose Columns\" in Meds & Orders section of the refill encounter.            Passed - Medication is active on med list        Passed - Patient is age 18 or older             Edgardo Bishop RN 04/14/22 10:50 AM  "

## 2022-05-03 ENCOUNTER — OFFICE VISIT (OUTPATIENT)
Dept: FAMILY MEDICINE | Facility: CLINIC | Age: 52
End: 2022-05-03
Payer: COMMERCIAL

## 2022-05-03 VITALS
SYSTOLIC BLOOD PRESSURE: 106 MMHG | BODY MASS INDEX: 30.57 KG/M2 | DIASTOLIC BLOOD PRESSURE: 73 MMHG | WEIGHT: 207 LBS | HEART RATE: 55 BPM | RESPIRATION RATE: 8 BRPM

## 2022-05-03 DIAGNOSIS — E78.00 PURE HYPERCHOLESTEROLEMIA: ICD-10-CM

## 2022-05-03 DIAGNOSIS — I10 ESSENTIAL HYPERTENSION, BENIGN: ICD-10-CM

## 2022-05-03 DIAGNOSIS — Z00.00 HEALTHCARE MAINTENANCE: ICD-10-CM

## 2022-05-03 DIAGNOSIS — E11.9 TYPE 2 DIABETES MELLITUS WITHOUT COMPLICATION, WITHOUT LONG-TERM CURRENT USE OF INSULIN (H): Primary | ICD-10-CM

## 2022-05-03 DIAGNOSIS — Z12.11 SCREEN FOR COLON CANCER: ICD-10-CM

## 2022-05-03 DIAGNOSIS — F33.0 MILD EPISODE OF RECURRENT MAJOR DEPRESSIVE DISORDER (H): ICD-10-CM

## 2022-05-03 DIAGNOSIS — J01.90 ACUTE SINUSITIS WITH SYMPTOMS > 10 DAYS: ICD-10-CM

## 2022-05-03 LAB
ALT SERPL W P-5'-P-CCNC: 36 U/L (ref 0–45)
ANION GAP SERPL CALCULATED.3IONS-SCNC: 13 MMOL/L (ref 5–18)
BUN SERPL-MCNC: 13 MG/DL (ref 8–22)
CALCIUM SERPL-MCNC: 9.4 MG/DL (ref 8.5–10.5)
CHLORIDE BLD-SCNC: 96 MMOL/L (ref 98–107)
CHOLEST SERPL-MCNC: 110 MG/DL
CO2 SERPL-SCNC: 25 MMOL/L (ref 22–31)
CREAT SERPL-MCNC: 0.82 MG/DL (ref 0.7–1.3)
CREAT UR-MCNC: 116 MG/DL
ERYTHROCYTE [DISTWIDTH] IN BLOOD BY AUTOMATED COUNT: 14 % (ref 10–15)
FASTING STATUS PATIENT QL REPORTED: YES
GFR SERPL CREATININE-BSD FRML MDRD: >90 ML/MIN/1.73M2
GLUCOSE BLD-MCNC: 148 MG/DL (ref 70–125)
HBA1C MFR BLD: 8 % (ref 0–5.6)
HCT VFR BLD AUTO: 45 % (ref 40–53)
HDLC SERPL-MCNC: 25 MG/DL
HGB BLD-MCNC: 14.7 G/DL (ref 13.3–17.7)
LDLC SERPL CALC-MCNC: 69 MG/DL
MCH RBC QN AUTO: 27.1 PG (ref 26.5–33)
MCHC RBC AUTO-ENTMCNC: 32.7 G/DL (ref 31.5–36.5)
MCV RBC AUTO: 83 FL (ref 78–100)
MICROALBUMIN UR-MCNC: 1.42 MG/DL (ref 0–1.99)
MICROALBUMIN/CREAT UR: 12.2 MG/G CR
PLATELET # BLD AUTO: 298 10E3/UL (ref 150–450)
POTASSIUM BLD-SCNC: 4.3 MMOL/L (ref 3.5–5)
RBC # BLD AUTO: 5.43 10E6/UL (ref 4.4–5.9)
SODIUM SERPL-SCNC: 134 MMOL/L (ref 136–145)
TRIGL SERPL-MCNC: 80 MG/DL
WBC # BLD AUTO: 5 10E3/UL (ref 4–11)

## 2022-05-03 PROCEDURE — 36415 COLL VENOUS BLD VENIPUNCTURE: CPT | Performed by: FAMILY MEDICINE

## 2022-05-03 PROCEDURE — 84460 ALANINE AMINO (ALT) (SGPT): CPT | Performed by: FAMILY MEDICINE

## 2022-05-03 PROCEDURE — 82043 UR ALBUMIN QUANTITATIVE: CPT | Performed by: FAMILY MEDICINE

## 2022-05-03 PROCEDURE — 85027 COMPLETE CBC AUTOMATED: CPT | Performed by: FAMILY MEDICINE

## 2022-05-03 PROCEDURE — 80061 LIPID PANEL: CPT | Performed by: FAMILY MEDICINE

## 2022-05-03 PROCEDURE — 99214 OFFICE O/P EST MOD 30 MIN: CPT | Performed by: FAMILY MEDICINE

## 2022-05-03 PROCEDURE — 80048 BASIC METABOLIC PNL TOTAL CA: CPT | Performed by: FAMILY MEDICINE

## 2022-05-03 PROCEDURE — 83036 HEMOGLOBIN GLYCOSYLATED A1C: CPT | Performed by: FAMILY MEDICINE

## 2022-05-03 RX ORDER — AMLODIPINE BESYLATE 5 MG/1
TABLET ORAL
Qty: 90 TABLET | Refills: 0 | Status: SHIPPED | OUTPATIENT
Start: 2022-05-03 | End: 2022-11-25

## 2022-05-03 RX ORDER — METFORMIN HCL 500 MG
1000 TABLET, EXTENDED RELEASE 24 HR ORAL 2 TIMES DAILY WITH MEALS
Qty: 360 TABLET | Refills: 3 | Status: SHIPPED | OUTPATIENT
Start: 2022-05-03 | End: 2023-06-21

## 2022-05-03 RX ORDER — HYDROCHLOROTHIAZIDE 25 MG/1
25 TABLET ORAL DAILY
Qty: 90 TABLET | Refills: 3 | Status: SHIPPED | OUTPATIENT
Start: 2022-05-03 | End: 2023-03-01

## 2022-05-03 RX ORDER — LOSARTAN POTASSIUM 100 MG/1
100 TABLET ORAL DAILY
Qty: 90 TABLET | Refills: 3 | Status: SHIPPED | OUTPATIENT
Start: 2022-05-03 | End: 2023-06-21

## 2022-05-03 ASSESSMENT — PATIENT HEALTH QUESTIONNAIRE - PHQ9
SUM OF ALL RESPONSES TO PHQ QUESTIONS 1-9: 5
SUM OF ALL RESPONSES TO PHQ QUESTIONS 1-9: 5
10. IF YOU CHECKED OFF ANY PROBLEMS, HOW DIFFICULT HAVE THESE PROBLEMS MADE IT FOR YOU TO DO YOUR WORK, TAKE CARE OF THINGS AT HOME, OR GET ALONG WITH OTHER PEOPLE: SOMEWHAT DIFFICULT

## 2022-05-03 NOTE — ASSESSMENT & PLAN NOTE
Background of allergy symptoms, on loratadine and fluticasone  Still having occasional pressure in the face, strong family history of sinusitis.

## 2022-05-03 NOTE — ASSESSMENT & PLAN NOTE
Metformin extended release 500 twice daily A1c is 8.0  Discussed options.  We will increase metformin to 1000 twice daily  Increase exercise  Follow-up 3 months  Continue  Statin, losartan  Microalbumin      Will make own optometry appointment

## 2022-05-03 NOTE — ASSESSMENT & PLAN NOTE
Well-controlled, continue losartan 100, amlodipine 5 and hydrochlorothiazide 25.  Checking blood pressure occasionally on its own.  Similar to these readings.  Check BMP

## 2022-05-03 NOTE — PROGRESS NOTES
Assessment/ Plan  Type 2 diabetes mellitus without complication, without long-term current use of insulin (H)  Metformin extended release 500.  A1c is pending  Encouraged more exercise    Statin, losartan  Microalbumin      Will make own optometry appointment      Pure hypercholesterolemia  Fasting, check lipid and ALT    Mild episode of recurrent major depressive disorder (H)  PHQ is only 5.  Feeling more stressed.  Continues citalopram.  Requesting referral for therapist.  Referral made    Healthcare maintenance  Due for vaccines, colon cancer screening    Essential hypertension, benign  Well-controlled, continue losartan 100, amlodipine 5 and hydrochlorothiazide 25.  Checking blood pressure occasionally on its own.  Similar to these readings.  Check BMP    Acute sinusitis with symptoms > 10 days  Background of allergy symptoms, on loratadine and fluticasone  Still having occasional pressure in the face, strong family history of sinusitis.     Subjective  CC:  chief complaint  HPI:  51-year-old  History of type 2 diabetes, takes metformin extended release 500 mg daily.  Also atorvastatin 20 mg.  Hypertension, losartan 100, amlodipine 5 and hydrochlorothiazide 25.    History of alcohol use.  Mild depressive disorder on citalopram 20 mg daily.    Due for COVID booster, colon cancer screening,    Also labs.    Last visit 4/16/2021.  Blood pressure was borderline elevated, no changes made, depression was controlled, and renewed citalopram.  Discussed alcohol use, followed up lipid abnormality with labs.    Grant presents with facial pressure  3 weeks duration  Followed mild URI that he was getting better from, then cough and facial pressure developed, thick discharge from the  Answers for HPI/ROS submitted by the patient on 5/3/2022  Are you regularly taking any medication or supplement to lower your cholesterol?: Yes  Are you having muscle aches or other side effects that you think could be caused by your cholesterol  lowering medication?: No  If you checked off any problems, how difficult have these problems made it for you to do your work, take care of things at home, or get along with other people?: Somewhat difficult  PHQ9 TOTAL SCORE: 5  Do you check your blood pressure regularly outside of the clinic?: Yes  Are your blood pressures ever more than 140 on the top number (systolic) OR more than 90 on the bottom number (diastolic)? (For example, greater than 140/90): No  Are you following a low salt diet?: Yes  Frequency of checking blood sugars:: not at all  Diabetic concerns:: none  Paraesthesia present:: none of these symptoms  Have you had a diabetic eye exam within the last year?: Yes  How many servings of fruits and vegetables do you eat daily?: 2-3  On average, how many sweetened beverages do you drink each day (Examples: soda, juice, sweet tea, etc.  Do NOT count diet or artificially sweetened beverages)?: 0  How many minutes a day do you exercise enough to make your heart beat faster?: 20 to 29  How many days a week do you exercise enough to make your heart beat faster?: 3 or less  How many days per week do you miss taking your medication?: 0      PFSH:  Patient Active Problem List   Diagnosis     Obesity     Essential hypertension, benign     Eczema     Acanthosis Nigricans     Generalized anxiety disorder     Mild episode of recurrent major depressive disorder (H)     Bereavement, uncomplicated     Type 2 diabetes mellitus without complication, without long-term current use of insulin (H)     Pure hypercholesterolemia     Healthcare maintenance     Chronic rhinitis     Acute sinusitis with symptoms > 10 days     atorvastatin (LIPITOR) 20 MG tablet, [ATORVASTATIN (LIPITOR) 20 MG TABLET] TAKE 1 TABLET BY MOUTH EVERY DAY  citalopram (CELEXA) 20 MG tablet, [CITALOPRAM (CELEXA) 20 MG TABLET] TAKE 1 TABLET BY MOUTH EVERY DAY  fluticasone propionate (FLONASE) 50 mcg/actuation nasal spray, [FLUTICASONE PROPIONATE (FLONASE)  50 MCG/ACTUATION NASAL SPRAY] SPRAY 2 SPRAYS INTO EACH NOSTRIL EVERY DAY  hydroCHLOROthiazide (HYDRODIURIL) 25 MG tablet, [HYDROCHLOROTHIAZIDE (HYDRODIURIL) 25 MG TABLET] Take 1 tablet (25 mg total) by mouth daily.  losartan (COZAAR) 100 MG tablet, [LOSARTAN (COZAAR) 100 MG TABLET] Take 1 tablet (100 mg total) by mouth daily.  metFORMIN (GLUCOPHAGE-XR) 500 MG 24 hr tablet, [METFORMIN (GLUCOPHAGE-XR) 500 MG 24 HR TABLET] TAKE 1 TABLET BY MOUTH TWICE A DAY    No current facility-administered medications on file prior to visit.       History   Smoking Status     Never Smoker   Smokeless Tobacco     Never Used     Social History     Social History Narrative     Not on file     Patient Care Team:  Clifford Evans MD as PCP - General (Family Medicine)  Clifford Evans MD as Assigned PCP  ROS  Is      Objective  Physical Exam  Vitals:    05/03/22 0952   BP: 106/73   BP Location: Right arm   Patient Position: Sitting   Cuff Size: Adult Large   Pulse: 55   Resp: 8   Weight: 93.9 kg (207 lb)     Body mass index is 30.57 kg/m .  Gen- alert, oriented/ appropriately responsive  HEENT- normal cephalic, atraumatic.   Chest- Normal inspiration and expiration.    Clear to ascultation.    No chest wall deformity or scar.  CV- Heart regular rate and rhythm  normal tones, no murmurs   No gallops or rubs.  Ext- appear well perfused, no edema  Skin- warm and dry,   no visualized rash    Diagnostics:   Results for orders placed or performed in visit on 05/03/22   Hemoglobin A1c     Status: Abnormal   Result Value Ref Range    Hemoglobin A1C 8.0 (H) 0.0 - 5.6 %   CBC with platelets     Status: Normal   Result Value Ref Range    WBC Count 5.0 4.0 - 11.0 10e3/uL    RBC Count 5.43 4.40 - 5.90 10e6/uL    Hemoglobin 14.7 13.3 - 17.7 g/dL    Hematocrit 45.0 40.0 - 53.0 %    MCV 83 78 - 100 fL    MCH 27.1 26.5 - 33.0 pg    MCHC 32.7 31.5 - 36.5 g/dL    RDW 14.0 10.0 - 15.0 %    Platelet Count 298 150 - 450 10e3/uL           Please note: Voice  recognition software was used in this dictation.  It may therefore contain typographical errors.

## 2022-05-03 NOTE — ASSESSMENT & PLAN NOTE
PHQ is only 5.  Feeling more stressed.  Continues citalopram.  Requesting referral for therapist.  Referral made

## 2022-05-04 ASSESSMENT — PATIENT HEALTH QUESTIONNAIRE - PHQ9: SUM OF ALL RESPONSES TO PHQ QUESTIONS 1-9: 5

## 2022-05-08 ASSESSMENT — ANXIETY QUESTIONNAIRES
5. BEING SO RESTLESS THAT IT IS HARD TO SIT STILL: NOT AT ALL
7. FEELING AFRAID AS IF SOMETHING AWFUL MIGHT HAPPEN: NOT AT ALL
3. WORRYING TOO MUCH ABOUT DIFFERENT THINGS: SEVERAL DAYS
GAD7 TOTAL SCORE: 4
GAD7 TOTAL SCORE: 4
6. BECOMING EASILY ANNOYED OR IRRITABLE: SEVERAL DAYS
7. FEELING AFRAID AS IF SOMETHING AWFUL MIGHT HAPPEN: NOT AT ALL
4. TROUBLE RELAXING: NOT AT ALL
1. FEELING NERVOUS, ANXIOUS, OR ON EDGE: SEVERAL DAYS
2. NOT BEING ABLE TO STOP OR CONTROL WORRYING: SEVERAL DAYS
8. IF YOU CHECKED OFF ANY PROBLEMS, HOW DIFFICULT HAVE THESE MADE IT FOR YOU TO DO YOUR WORK, TAKE CARE OF THINGS AT HOME, OR GET ALONG WITH OTHER PEOPLE?: SOMEWHAT DIFFICULT
GAD7 TOTAL SCORE: 4

## 2022-05-09 ENCOUNTER — VIRTUAL VISIT (OUTPATIENT)
Dept: PSYCHOLOGY | Facility: CLINIC | Age: 52
End: 2022-05-09
Attending: FAMILY MEDICINE
Payer: COMMERCIAL

## 2022-05-09 DIAGNOSIS — F33.0 MILD EPISODE OF RECURRENT MAJOR DEPRESSIVE DISORDER (H): ICD-10-CM

## 2022-05-09 PROCEDURE — 90791 PSYCH DIAGNOSTIC EVALUATION: CPT | Mod: 95 | Performed by: SOCIAL WORKER

## 2022-05-09 ASSESSMENT — COLUMBIA-SUICIDE SEVERITY RATING SCALE - C-SSRS
4. HAVE YOU HAD THESE THOUGHTS AND HAD SOME INTENTION OF ACTING ON THEM?: NO
2. HAVE YOU ACTUALLY HAD ANY THOUGHTS OF KILLING YOURSELF?: NO
1. IN THE PAST MONTH, HAVE YOU WISHED YOU WERE DEAD OR WISHED YOU COULD GO TO SLEEP AND NOT WAKE UP?: NO
6. HAVE YOU EVER DONE ANYTHING, STARTED TO DO ANYTHING, OR PREPARED TO DO ANYTHING TO END YOUR LIFE?: NO
ATTEMPT LIFETIME: NO
3. HAVE YOU BEEN THINKING ABOUT HOW YOU MIGHT KILL YOURSELF?: NO
1. HAVE YOU WISHED YOU WERE DEAD OR WISHED YOU COULD GO TO SLEEP AND NOT WAKE UP?: YES
TOTAL  NUMBER OF INTERRUPTED ATTEMPTS LIFETIME: NO
TOTAL  NUMBER OF ABORTED OR SELF INTERRUPTED ATTEMPTS LIFETIME: NO
5. HAVE YOU STARTED TO WORK OUT OR WORKED OUT THE DETAILS OF HOW TO KILL YOURSELF? DO YOU INTEND TO CARRY OUT THIS PLAN?: NO
2. HAVE YOU ACTUALLY HAD ANY THOUGHTS OF KILLING YOURSELF?: YES

## 2022-05-09 ASSESSMENT — ANXIETY QUESTIONNAIRES: GAD7 TOTAL SCORE: 4

## 2022-05-09 NOTE — PROGRESS NOTES
"[facility]  Provider Name:  YURIDIA Hodgson, LICSW      PATIENT'S NAME: Grant Slaughter  PREFERRED NAME: Grant  PRONOUNS:       MRN: [id number]  : 1970  ADDRESS: [facility] [address] [place] [address]  [facility]  [id number]  DATE OF SERVICE: 22  START TIME: 12pm  END TIME: 12:45pm  PREFERRED PHONE: [phone]  May we leave a program related message: Yes  SERVICE MODALITY:  Video Visit:      Provider verified identity through the following two step process.  Patient provided:  Patient     Telemedicine Visit: The patient's condition can be safely assessed and treated via synchronous audio and visual telemedicine encounter.      Reason for Telemedicine Visit: Services only offered telehealth    Originating Site (Patient Location): Patient's home    Distant Site (Provider Location): Provider Remote Setting- Home Office    Consent:  The patient/guardian has verbally consented to: the potential risks and benefits of telemedicine (video visit) versus in person care; bill my insurance or make self-payment for services provided; and responsibility for payment of non-covered services.     Patient would like the video invitation sent by:  My Chart    Mode of Communication:  Video Conference via [employer]    As the provider I attest to compliance with applicable laws and regulations related to telemedicine.    UNIVERSAL ADULT Mental Health DIAGNOSTIC ASSESSMENT    Identifying Information:  Patient is a 51 year old,  .  The pronoun use throughout this assessment reflects the patient's chosen pronoun.  Patient was referred for an assessment by primary care provider.  Patient attended the session alone.    Chief Complaint:   The reason for seeking services at this time is: \"Depression\".  The problem(s) began 2007.    Patient has attempted to resolve these concerns in the past through some previous counseling.    Social/Family History:  Patient reported they " grew up in [place].  They were raised by biological parents  .  Parents  / .    The patient describes their cultural background as .  Cultural influences and impact on patient's life structure, values, norms, and healthcare: I am not Roman Catholic. These factors will be addressed in the Preliminary Treatment plan. Patient identified their preferred language to be English. Patient reported they does not need the assistance of an  or other support involved in therapy.     Patient reported had no significant delays in developmental tasks.   Patient's highest education level was college graduate  .  Patient identified the following learning problems: none reported.  Modifications will not be used to assist communication in therapy. Patient reports they are  able to understand written materials.     Patient's current relationship status is single.   Patient identified their sexual orientation as heterosexual.  Patient reported having   child(mckenzie). Patient identified siblings; friends; co-worker as part of their support system.  Patient identified the quality of these relationships as good,  .      Patient's current living/housing situation involves staying in own home/apartment.  The immediate members of family and household include Tori Bueno, 63,Sister and they report that housing is stable.    Patient is currently employed fulltime.  Patient reports their finances are obtained through employment. Patient does not identify finances as a current stressor.      Patient reported that they have not been involved with the legal system.    Patient does not report being under probation/ parole/ jurisdiction. They are not under any current court jurisdiction. .    Patient's Strengths and Limitations:  Patient identified the following strengths or resources that will help them succeed in treatment: commitment to health and well being, family support and intelligence. Things  that may interfere with the patient's success in treatment include: none identified.     Assessments:  The following assessments were completed by patient for this visit:  PHQ9:   PHQ-9 SCORE 10/16/2019 5/3/2022   PHQ-9 Total Score MyChart - 5 (Mild depression)   PHQ-9 Total Score 6 5     GAD7:   SHARON-7 SCORE 5/8/2022   Total Score 4 (minimal anxiety)   Total Score 4       Personal and Family Medical History:  Patient does not report a family history of mental health concerns.  Patient reports family history is not on file..     Patient does report Mental Health Diagnosis and/or Treatment.  Patient Patient reported the following previous diagnoses which include(s): none reported.  Patient reported symptoms began off and on throughout adulthood.   Patient has received mental health services in the past: some counseling previously.  Psychiatric Hospitalizations: None.  Patient denies a history of civil commitment.  Patient is receiving other mental health services.  These include none.         Patient has had a physical exam to rule out medical causes for current symptoms.  Date of last physical exam was within the past year. Symptoms have developed since last physical exam and client was encouraged to follow up with PCP.  . The patient has a [place] Primary Care Provider, who is named Clifford Evans..  Patient reports no current medical concerns.  Patient denies any issues with pain..   There are not significant appetite / nutritional concerns / weight changes.   Patient does not report a history of head injury / trauma / cognitive impairment.    Patient reports current meds as:   Outpatient Medications Marked as Taking for the 5/9/22 encounter (Virtual Visit) with Violeta Pompa   Medication Sig     amLODIPine (NORVASC) 5 MG tablet [AMLODIPINE (NORVASC) 5 MG TABLET] TAKE 1 TABLET BY MOUTH EVERY DAY     amoxicillin-clavulanate (AUGMENTIN) 875-125 MG tablet Take 1 tablet by mouth 2 times daily     atorvastatin  (LIPITOR) 20 MG tablet [ATORVASTATIN (LIPITOR) 20 MG TABLET] TAKE 1 TABLET BY MOUTH EVERY DAY     citalopram (CELEXA) 20 MG tablet [CITALOPRAM (CELEXA) 20 MG TABLET] TAKE 1 TABLET BY MOUTH EVERY DAY     fluticasone propionate (FLONASE) 50 mcg/actuation nasal spray [FLUTICASONE PROPIONATE (FLONASE) 50 MCG/ACTUATION NASAL SPRAY] SPRAY 2 SPRAYS INTO EACH NOSTRIL EVERY DAY     hydrochlorothiazide (HYDRODIURIL) 25 MG tablet Take 1 tablet (25 mg) by mouth daily     hydroCHLOROthiazide (HYDRODIURIL) 25 MG tablet [HYDROCHLOROTHIAZIDE (HYDRODIURIL) 25 MG TABLET] Take 1 tablet (25 mg total) by mouth daily.     losartan (COZAAR) 100 MG tablet Take 1 tablet (100 mg) by mouth daily     losartan (COZAAR) 100 MG tablet [LOSARTAN (COZAAR) 100 MG TABLET] Take 1 tablet (100 mg total) by mouth daily.     metFORMIN (GLUCOPHAGE-XR) 500 MG 24 hr tablet Take 2 tablets (1,000 mg) by mouth 2 times daily (with meals)       Medication Adherence:  Patient reports taking.  taking prescribed medications as prescribed.    Patient Allergies:    Allergies   Allergen Reactions     No Known Drug Allergies Unknown       Medical History:  No past medical history on file.      Current Mental Status Exam:   Appearance:  Appropriate    Eye Contact:  Good   Psychomotor:  Normal       Gait / station:  no problem  Attitude / Demeanor: Cooperative   Speech      Rate / Production: Normal/ Responsive      Volume:  Normal  volume      Language:  intact  Mood:   Depressed   Affect:   Restricted    Thought Content: Clear   Thought Process: Coherent  Logical       Associations: No loosening of associations  Insight:   Good   Judgment:  Intact   Orientation:  All  Attention/concentration: Good      Substance Use:  Patient did not report a family history of substance use concerns; see medical history section for details.  Patient has not received chemical dependency treatment in the past.  Patient has not ever been to detox.      Patient is not currently receiving  any chemical dependency treatment.           Substance History of use Age of first use Date of last use     Pattern and duration of use (include amounts and frequency)   Alcohol currently use   15 4/16/2022    Cannabis   used in the past 21 6/8/1992      Amphetamines   never used        Cocaine/crack    never used          Hallucinogens never used            Inhalants never used            Heroin never used            Other Opiates never used        Benzodiazepine   never used        Barbiturates never used        Over the counter meds never used        Caffeine currently use 23      Nicotine  never used        Other substances not listed above:  Identify:  never used          Patient reported the following problems as a result of their substance use: DUI.    Substance Use: No symptoms    Based on the negative CAGE score and clinical interview there  are not indications of drug or alcohol abuse.      Significant Losses / Trauma / Abuse / Neglect Issues:   Patient did not serve in the .  There are indications or report of significant loss, trauma, abuse or neglect issues related to: are no indications and client denies any losses, trauma, abuse, or neglect concerns.  Concerns for possible neglect are not present.    Safety Assessment:   Patient denies current homicidal ideation and behaviors.  Patient denies current self-injurious ideation and behaviors.    Patient denied risk behaviors associated with substance use.  Patient denies any high risk behaviors associated with mental health symptoms.  Patient reports the following current concerns for their personal safety: None.  Patient reports there are not firearms in the house.       The firearms are secured in a locked space.    History of Safety Concerns:  Patient denied a history of homicidal ideation.     Patient denied a history of personal safety concerns.    Patient denied a history of assaultive behaviors.    Patient denied a history of sexual assault  behaviors.     Patient denied a history of risk behaviors associated with substance use.  Patient denies any history of high risk behaviors associated with mental health symptoms.  Patient reports the following protective factors: forward or future oriented thinking; dedication to family or friends; daily obligations; financial stability    Risk Plan:  See Recommendations for Safety and Risk Management Plan    Review of Symptoms per patient report:  Depression: Lack of interest, Excessive or inappropriate guilt, Change in energy level, Difficulties concentrating, Low self-worth and Feeling sad, down, or depressed  [place]:  No Symptoms  Psychosis: No Symptoms  Anxiety: Nervousness  Panic:  No symptoms  Post Traumatic Stress Disorder:  No Symptoms   Eating Disorder: No Symptoms  ADD / ADHD:  No symptoms  Conduct Disorder: No symptoms  Autism Spectrum Disorder: No symptoms  Obsessive Compulsive Disorder: No Symptoms    Patient reports the following compulsive behaviors and treatment history: none.      Diagnostic Criteria:   Major Depressive Disorder  A) Recurrent episode(s) - symptoms have been present during the same 2-week period and represent a change from previous functioning 5 or more symptoms (required for diagnosis)   - Depressed mood. Note: In children and adolescents, can be irritable mood.     - Diminished interest or pleasure in all, or almost all, activities.    - Fatigue or loss of energy.    - Diminished ability to think or concentrate, or indecisiveness.   B) The symptoms cause clinically significant distress or impairment in social, occupational, or other important areas of functioning  C) The episode is not attributable to the physiological effects of a substance or to another medical condition  D) The occurence of major depressive episode is not better explained by other thought / psychotic disorders  E) There has never been a manic episode or hypomanic episode    Functional Status:  Patient reports  the following functional impairments:  relationship(s), self-care, social interactions and work / vocational responsibilities.     Nonprogrammatic care:  Patient is requesting basic services to address current mental health concerns.    Clinical Summary:  1. Reason for assessment: history of depression, ongoing relational difficulties.  2. Psychosocial, Cultural and Contextual Factors: work stress.  3. Principal DSM5 Diagnoses  (Sustained by DSM5 Criteria Listed Above):   296.31 (F33.0) Major Depressive Disorder, Recurrent Episode, Mild _ and With anxious distress.      Recommendations:     1. Plan for Safety and Risk Management:Recommended that patient call 911 or go to the local ED should there be a change in any of these risk factors..  Report to child / adult protection services was NA.     2. Initial Treatment will focus on: depression     3. Resources/Service Plan:       services are not indicated.     Modifications to assist communication are not indicated.     Additional disability accommodations are not indicated.      4. Collaboration:  Collaboration / coordination of treatment will be initiated with the following support professionals: primary care physician.      5.  Referrals:  The following referral(s) will be initiated: none.   A Release of Information has been obtained for the following: none.    6. JAUN: JAUN:  Discussed the general effects of drugs and alcohol on health and well-being. Provider gave patient printed information about the effects of chemical use on their health and well being. Recommendations:  none at the time of this assessment.     7. Records were reviewed at time of assessment.  Information in this assessment was obtained from the medical record and provided by patient who is a good historian.   Patient will have open access to their mental health medical record.      Eval type:  Mental Health    Staff Name/Credentials:  YURIDIA Hodgson, LICSW                May 9,  2022

## 2022-05-26 ENCOUNTER — VIRTUAL VISIT (OUTPATIENT)
Dept: PSYCHOLOGY | Facility: CLINIC | Age: 52
End: 2022-05-26
Payer: COMMERCIAL

## 2022-05-26 DIAGNOSIS — F33.0 MILD EPISODE OF RECURRENT MAJOR DEPRESSIVE DISORDER (H): Primary | ICD-10-CM

## 2022-05-26 PROCEDURE — 90834 PSYTX W PT 45 MINUTES: CPT | Mod: 95 | Performed by: SOCIAL WORKER

## 2022-05-26 NOTE — PROGRESS NOTES
[facility]                                     Progress Note    Patient Name: Grant Slaughter  Date: 2022         Service Type: Individual      Session Start Time: 3pm  Session End Time: 3:45pm     Session Length: 45 minutes    Session #: 2    Attendees: Client attended alone    Service Modality:  Video Visit:      Provider verified identity through the following two step process.  Patient provided:  Patient     Telemedicine Visit: The patient's condition can be safely assessed and treated via synchronous audio and visual telemedicine encounter.      Reason for Telemedicine Visit: Services only offered telehealth    Originating Site (Patient Location): Patient's home    Distant Site (Provider Location): Provider Remote Setting- Home Office    Consent:  The patient/guardian has verbally consented to: the potential risks and benefits of telemedicine (video visit) versus in person care; bill my insurance or make self-payment for services provided; and responsibility for payment of non-covered services.     Patient would like the video invitation sent by:  My Chart    Mode of Communication:  Video Conference via [employer]    As the provider I attest to compliance with applicable laws and regulations related to telemedicine.    DATA  Interactive Complexity: No  Crisis: No        Progress Since Last Session (Related to Symptoms / Goals / Homework):   Symptoms: No change had to call out of work due to depression and having drank too much, reports he is self medicating with alcohol    Homework: Achieved / completed to satisfaction      Episode of Care Goals: Minimal progress - PREPARATION (Decided to change - considering how); Intervened by negotiating a change plan and determining options / strategies for behavior change, identifying triggers, exploring social supports, and working towards setting a date to begin behavior change     Current / Ongoing Stressors and Concerns:   Client discussed  depression symptoms and reports he felt increased depression a few days last week. Also noted he was drinking 6-8 beers per night and had to call in sick to work a couple of times. Client asked for referral to CD treatment, therapist will submit referral and also discussed having client go to AA meetings over the weekend.      Treatment Objective(s) Addressed in This Session:   Client will Decrease frequency and intensity of feeling down, depressed, hopeless  Client will identify triggers to depression  Identify negative self-talk and behaviors: challenge core beliefs, myths, and actions.  Client will identify 4-6 effective coping strategies for managing depression symptoms     Intervention:   Solution Focused: CD eval referral; referred to AA; sleep habit strategies    Assessments completed prior to visit:  The following assessments were completed by patient for this visit:  PHQ9:   PHQ-9 SCORE 10/16/2019 5/3/2022   PHQ-9 Total Score MyChart - 5 (Mild depression)   PHQ-9 Total Score 6 5     GAD7:   SHARON-7 SCORE 5/8/2022   Total Score 4 (minimal anxiety)   Total Score 4         ASSESSMENT: Current Emotional / Mental Status (status of significant symptoms):   Risk status (Self / Other harm or suicidal ideation)   Patient denies current fears or concerns for personal safety.   Patient denies current or recent suicidal ideation or behaviors.   Patient denies current or recent homicidal ideation or behaviors.   Patient denies current or recent self injurious behavior or ideation.   Patient denies other safety concerns.   Patient reports there has been no change in risk factors since their last session.     Patient reports there has been no change in protective factors since their last session.     Recommended that patient call 911 or go to the local ED should there be a change in any of these risk factors.     Appearance:   Appropriate    Eye Contact:   Good    Psychomotor Behavior: Normal    Attitude:   Cooperative     Orientation:   All   Speech    Rate / Production: Normal     Volume:  Normal    Mood:    Depressed    Affect:    Flat    Thought Content:  Clear    Thought Form:  Coherent  Logical    Insight:    Good      Medication Review:   No changes to current psychiatric medication(s)     Medication Compliance:   Yes     Changes in Health Issues:   None reported     Chemical Use Review:   Substance Use: Problem use continues with no change since last session, Referred client for formal CD evaluation        Tobacco Use: No current tobacco use.      Diagnosis:  1. Mild episode of recurrent major depressive disorder (H)        Collateral Reports Completed:   Not Applicable    PLAN: (Patient Tasks / Therapist Tasks / Other)  Client referred to AA and for CD evaluation; will practice sleep hygiene strategies discussed today        YURIDIA Hodgson, LICSW   5/26/2022                                                         ______________________________________________________________________    Individual Treatment Plan    Patient's Name: Grant Slaughter  YOB: 1970    Date of Creation: 5/26/2022  Date Treatment Plan Last Reviewed/Revised:     Diagnosis:  1. Mild episode of recurrent major depressive disorder (H)      Psychosocial / Contextual Factors: work stress  PROMIS (reviewed every 90 days):     Referral / Collaboration:  The following referral(s) will be initiated: CD sukhwinder.    Anticipated number of session for this episode of care: 10-15  Anticipation frequency of session: Weekly  Anticipated Duration of each session: 38-52 minutes  Treatment plan will be reviewed in 90 days or when goals have been changed.       MeasurableTreatment Goal(s) related to diagnosis / functional impairment(s)  Goal 1: Client will experience decreased depression symptoms evidenced by PHQ9 score below 5 and report increased ability to manage depression.    Objective #A (Client Action)    Client will Decrease frequency and intensity  of feeling down, depressed, hopeless  Client will identify triggers to depression  Identify negative self-talk and behaviors: challenge core beliefs, myths, and actions.  Client will identify 4-6 effective coping strategies for managing depression symptoms    Status: New - Date: 5/26/2022    Intervention(s)  Therapist will teach CBT and DBT skills.      Patient has reviewed and agreed to the above plan.      YURIDIA Hodgson, [facility]  May 26, 2022

## 2022-06-09 ENCOUNTER — TELEPHONE (OUTPATIENT)
Dept: BEHAVIORAL HEALTH | Facility: CLINIC | Age: 52
End: 2022-06-09
Payer: COMMERCIAL

## 2022-06-10 ENCOUNTER — VIRTUAL VISIT (OUTPATIENT)
Dept: PSYCHOLOGY | Facility: CLINIC | Age: 52
End: 2022-06-10
Payer: COMMERCIAL

## 2022-06-10 DIAGNOSIS — F33.1 MODERATE EPISODE OF RECURRENT MAJOR DEPRESSIVE DISORDER (H): Primary | ICD-10-CM

## 2022-06-10 PROCEDURE — 90834 PSYTX W PT 45 MINUTES: CPT | Mod: 95 | Performed by: SOCIAL WORKER

## 2022-06-10 ASSESSMENT — ANXIETY QUESTIONNAIRES
GAD7 TOTAL SCORE: 11
GAD7 TOTAL SCORE: 11
3. WORRYING TOO MUCH ABOUT DIFFERENT THINGS: MORE THAN HALF THE DAYS
GAD7 TOTAL SCORE: 11
1. FEELING NERVOUS, ANXIOUS, OR ON EDGE: SEVERAL DAYS
GAD7 TOTAL SCORE: 11
5. BEING SO RESTLESS THAT IT IS HARD TO SIT STILL: SEVERAL DAYS
1. FEELING NERVOUS, ANXIOUS, OR ON EDGE: SEVERAL DAYS
GAD7 TOTAL SCORE: 11
2. NOT BEING ABLE TO STOP OR CONTROL WORRYING: MORE THAN HALF THE DAYS
7. FEELING AFRAID AS IF SOMETHING AWFUL MIGHT HAPPEN: MORE THAN HALF THE DAYS
6. BECOMING EASILY ANNOYED OR IRRITABLE: MORE THAN HALF THE DAYS
4. TROUBLE RELAXING: SEVERAL DAYS
3. WORRYING TOO MUCH ABOUT DIFFERENT THINGS: MORE THAN HALF THE DAYS
6. BECOMING EASILY ANNOYED OR IRRITABLE: MORE THAN HALF THE DAYS
5. BEING SO RESTLESS THAT IT IS HARD TO SIT STILL: SEVERAL DAYS
7. FEELING AFRAID AS IF SOMETHING AWFUL MIGHT HAPPEN: MORE THAN HALF THE DAYS
4. TROUBLE RELAXING: SEVERAL DAYS
2. NOT BEING ABLE TO STOP OR CONTROL WORRYING: MORE THAN HALF THE DAYS
GAD7 TOTAL SCORE: 11

## 2022-06-10 ASSESSMENT — PATIENT HEALTH QUESTIONNAIRE - PHQ9
10. IF YOU CHECKED OFF ANY PROBLEMS, HOW DIFFICULT HAVE THESE PROBLEMS MADE IT FOR YOU TO DO YOUR WORK, TAKE CARE OF THINGS AT HOME, OR GET ALONG WITH OTHER PEOPLE: SOMEWHAT DIFFICULT
SUM OF ALL RESPONSES TO PHQ QUESTIONS 1-9: 9
SUM OF ALL RESPONSES TO PHQ QUESTIONS 1-9: 9

## 2022-06-10 NOTE — PROGRESS NOTES
[facility]                                     Progress Note    Patient Name: Grant Slaughter  Date: 6/10/2022         Service Type: Individual      Session Start Time: 8am  Session End Time: 8:45am     Session Length: 45 minutes    Session #: 3    Attendees: Client attended alone    Service Modality:  Video Visit:      Provider verified identity through the following two step process.  Patient provided:  Patient     Telemedicine Visit: The patient's condition can be safely assessed and treated via synchronous audio and visual telemedicine encounter.      Reason for Telemedicine Visit: Services only offered telehealth    Originating Site (Patient Location): Patient's home    Distant Site (Provider Location): Provider Remote Setting- Home Office    Consent:  The patient/guardian has verbally consented to: the potential risks and benefits of telemedicine (video visit) versus in person care; bill my insurance or make self-payment for services provided; and responsibility for payment of non-covered services.     Patient would like the video invitation sent by:  My Chart    Mode of Communication:  Video Conference via [employer]    As the provider I attest to compliance with applicable laws and regulations related to telemedicine.    DATA  Interactive Complexity: No  Crisis: No        Progress Since Last Session (Related to Symptoms / Goals / Homework):   Symptoms: No change missed a few days of work due to depression and having drank too much, reports he is self medicating with alcohol    Homework: Achieved / completed to satisfaction      Episode of Care Goals: Minimal progress - PREPARATION (Decided to change - considering how); Intervened by negotiating a change plan and determining options / strategies for behavior change, identifying triggers, exploring social supports, and working towards setting a date to begin behavior change     Current / Ongoing Stressors and Concerns:   Client reports he  scheduled CD evaluation. Discussed being more honest with himself about the extent of the problem. Also recognized that he likely needs to address alcohol use prior to being able to fully treat/manage depression. Also did follow through with attending a couple of AA meetings. Has CD eval Monday.     Treatment Objective(s) Addressed in This Session:   Client will Decrease frequency and intensity of feeling down, depressed, hopeless  Client will identify triggers to depression  Identify negative self-talk and behaviors: challenge core beliefs, myths, and actions.  Client will identify 4-6 effective coping strategies for managing depression symptoms     Intervention:   Solution Focused: CD eval referral; referred to AA; sleep habit strategies    Assessments completed prior to visit:  The following assessments were completed by patient for this visit:  PHQ9:   PHQ-9 SCORE 10/16/2019 5/3/2022 6/10/2022   PHQ-9 Total Score MyChart - 5 (Mild depression) 9 (Mild depression)   PHQ-9 Total Score 6 5 9     GAD7:   SHARON-7 SCORE 5/8/2022 6/10/2022   Total Score 4 (minimal anxiety) 11 (moderate anxiety)   Total Score 4 11   Some encounter information is confidential and restricted. Go to Review Flowsheets activity to see all data.         ASSESSMENT: Current Emotional / Mental Status (status of significant symptoms):   Risk status (Self / Other harm or suicidal ideation)   Patient denies current fears or concerns for personal safety.   Patient denies current or recent suicidal ideation or behaviors.   Patient denies current or recent homicidal ideation or behaviors.   Patient denies current or recent self injurious behavior or ideation.   Patient denies other safety concerns.   Patient reports there has been no change in risk factors since their last session.     Patient reports there has been no change in protective factors since their last session.     Recommended that patient call 911 or go to the local ED should there be a  change in any of these risk factors.     Appearance:   Appropriate    Eye Contact:   Good    Psychomotor Behavior: Normal    Attitude:   Cooperative    Orientation:   All   Speech    Rate / Production: Normal     Volume:  Normal    Mood:    Depressed    Affect:    Flat    Thought Content:  Clear    Thought Form:  Coherent  Logical    Insight:    Good      Medication Review:   No changes to current psychiatric medication(s)     Medication Compliance:   Yes     Changes in Health Issues:   None reported     Chemical Use Review:   Substance Use: Problem use continues with no change since last session, Referred client for formal CD evaluation        Tobacco Use: No current tobacco use.      Diagnosis:  1. Moderate episode of recurrent major depressive disorder (H)        Collateral Reports Completed:   Not Applicable    PLAN: (Patient Tasks / Therapist Tasks / Other)  Client referred to AA and for CD evaluation; will follow recommendations of CD evaluation he has scheduled for monday        YURIDIA Hodgson, [facility]   6/10/2022                                                         ______________________________________________________________________    Individual Treatment Plan    Patient's Name: Grant Slaughter  YOB: 1970    Date of Creation: 5/26/2022  Date Treatment Plan Last Reviewed/Revised:     Diagnosis:  1. Mild episode of recurrent major depressive disorder (H)      Psychosocial / Contextual Factors: work stress  PROMIS (reviewed every 90 days):     Referral / Collaboration:  The following referral(s) will be initiated: CD eval.    Anticipated number of session for this episode of care: 10-15  Anticipation frequency of session: Weekly  Anticipated Duration of each session: 38-52 minutes  Treatment plan will be reviewed in 90 days or when goals have been changed.       MeasurableTreatment Goal(s) related to diagnosis / functional impairment(s)  Goal 1: Client will experience decreased depression  symptoms evidenced by PHQ9 score below 5 and report increased ability to manage depression.    Objective #A (Client Action)    Client will Decrease frequency and intensity of feeling down, depressed, hopeless  Client will identify triggers to depression  Identify negative self-talk and behaviors: challenge core beliefs, myths, and actions.  Client will identify 4-6 effective coping strategies for managing depression symptoms    Status: New - Date: 5/26/2022    Intervention(s)  Therapist will teach CBT and DBT skills.      Patient has reviewed and agreed to the above plan.      YURIDIA Hodgson, LICSW  May 26, 2022

## 2022-06-13 ENCOUNTER — HOSPITAL ENCOUNTER (OUTPATIENT)
Dept: BEHAVIORAL HEALTH | Facility: CLINIC | Age: 52
Discharge: HOME | End: 2022-06-13
Attending: FAMILY MEDICINE | Admitting: FAMILY MEDICINE
Payer: COMMERCIAL

## 2022-06-13 ENCOUNTER — TELEPHONE (OUTPATIENT)
Dept: BEHAVIORAL HEALTH | Facility: CLINIC | Age: 52
End: 2022-06-13

## 2022-06-13 VITALS — BODY MASS INDEX: 30.1 KG/M2 | WEIGHT: 215 LBS | HEIGHT: 71 IN

## 2022-06-13 PROCEDURE — H0001 ALCOHOL AND/OR DRUG ASSESS: HCPCS | Mod: GT,95 | Performed by: COUNSELOR

## 2022-06-13 ASSESSMENT — ANXIETY QUESTIONNAIRES
4. TROUBLE RELAXING: NOT AT ALL
1. FEELING NERVOUS, ANXIOUS, OR ON EDGE: NEARLY EVERY DAY
5. BEING SO RESTLESS THAT IT IS HARD TO SIT STILL: NOT AT ALL
7. FEELING AFRAID AS IF SOMETHING AWFUL MIGHT HAPPEN: NOT AT ALL
6. BECOMING EASILY ANNOYED OR IRRITABLE: SEVERAL DAYS
2. NOT BEING ABLE TO STOP OR CONTROL WORRYING: SEVERAL DAYS
GAD7 TOTAL SCORE: 6
3. WORRYING TOO MUCH ABOUT DIFFERENT THINGS: SEVERAL DAYS

## 2022-06-13 ASSESSMENT — PATIENT HEALTH QUESTIONNAIRE - PHQ9
SUM OF ALL RESPONSES TO PHQ QUESTIONS 1-9: 7
10. IF YOU CHECKED OFF ANY PROBLEMS, HOW DIFFICULT HAVE THESE PROBLEMS MADE IT FOR YOU TO DO YOUR WORK, TAKE CARE OF THINGS AT HOME, OR GET ALONG WITH OTHER PEOPLE: SOMEWHAT DIFFICULT
SUM OF ALL RESPONSES TO PHQ QUESTIONS 1-9: 7

## 2022-06-13 ASSESSMENT — COLUMBIA-SUICIDE SEVERITY RATING SCALE - C-SSRS
6. HAVE YOU EVER DONE ANYTHING, STARTED TO DO ANYTHING, OR PREPARED TO DO ANYTHING TO END YOUR LIFE?: YES
2. HAVE YOU ACTUALLY HAD ANY THOUGHTS OF KILLING YOURSELF IN THE PAST MONTH?: NO
3. HAVE YOU BEEN THINKING ABOUT HOW YOU MIGHT KILL YOURSELF?: NO
4. HAVE YOU HAD THESE THOUGHTS AND HAD SOME INTENTION OF ACTING ON THEM?: NO
5. HAVE YOU STARTED TO WORK OUT OR WORKED OUT THE DETAILS OF HOW TO KILL YOURSELF? DO YOU INTEND TO CARRY OUT THIS PLAN?: NO
1. IN THE PAST MONTH, HAVE YOU WISHED YOU WERE DEAD OR WISHED YOU COULD GO TO SLEEP AND NOT WAKE UP?: NO

## 2022-06-13 ASSESSMENT — PAIN SCALES - GENERAL: PAINLEVEL: NO PAIN (0)

## 2022-06-13 NOTE — TELEPHONE ENCOUNTER
6/13/2022  Pt completed his JAUN CA today. I provided him with some inpatient JAUN treatment programs and he was going to look into them and then let me know which program would be a good fit for him.    [facility] Assessment ID: [id number]    Treatment options:  [facility]: [email or web address]  [facility]: https://[facility].org/  [facility]: [email or web address]://[email or web address]/residential-treatment-from-[facility]/  [facility]:    [email or web address]/overarching-care/behavioral-[email or web address]/lodging-plus-residential-program  [facility]: [email or web address]

## 2022-06-13 NOTE — PROGRESS NOTES
[facility]  Provider Name:  Farida Bah MA [facility]  Provider Phone Number: [phone]    PATIENT'S NAME: Grant Slaughter  PREFERRED NAME: Grant  PRONOUNS: he/him/his      MRN: [id number]  : 1970  ADDRESS: [address] [place] [address]  ACCT. NUMBER:  [id number]  INSURANCE PROVIDER: Law  DATE OF SERVICE: 22  START TIME: 1:02pm  END TIME: 1:45pm  PREFERRED PHONE: [phone]  May we leave a program related message: Yes  EMERGENCY CONTACT:   Primary Emergency Contact: Tori Clark  Home Phone: [phone]  Mobile Phone: [phone]  Relation: Sister    SERVICE MODALITY:  Video Visit:      Provider verified identity through the following two step process.  Patient provided:  Patient  and Patient's last 4 digits of SSN    Telemedicine Visit: The patient's condition can be safely assessed and treated via synchronous audio and visual telemedicine encounter.      Reason for Telemedicine Visit: Patient has requested telehealth visit    Originating Site (Patient Location): Patient's home    Distant Site (Provider Location): Provider Remote Setting- Home Office    Consent:  The patient/guardian has verbally consented to: the potential risks and benefits of telemedicine (video visit) versus in person care; bill my insurance or make self-payment for services provided; and responsibility for payment of non-covered services.     Patient would like the video invitation sent by:  My Chart    Mode of Communication:  Video Conference via Amwell    As the provider I attest to compliance with applicable laws and regulations related to telemedicine.    UNIVERSAL ADULT Substance Use Disorder DIAGNOSTIC ASSESSMENT    Identifying Information:  Patient is a 51 year old, .  The pronoun use throughout this assessment reflects the patient's chosen pronoun.  Patient was referred for an assessment by primary care provider.   "Patient attended the session alone.    Chief Complaint:   The reason for seeking services at this time is: \"I have been having some mental health issues and decided to see a mental health therapist. And everything\" in their therpay sessions relates to his drinking.  The problem(s) began with alcohol in his early 20s. His use with alcohol has ebbed and flowed over the years. Patient has attempted to resolve these concerns in the past through AA meetings.  Patient does not appear to be in severe withdrawal, an imminent safety risk to self or others, or requiring immediate medical attention and may proceed with the assessment interview.    Social/Family History:  Patient reported they grew up in [place], [place]. They were raised by biological parents.  Parents  / . Both parents passed away in 2017.  Patient reported that their childhood was \"good.\"  Patient described their current relationships with family of origin as \"good. I don't have a relationship with my other sister Chichi.\" She has a significant drug and alcohol problem.      The patient describes their cultural background as .  Cultural influences and impact on patient's life structure, values, norms, and healthcare: \"I am not Orthodox.\" Contextual influences on patient's health include: NA.  Patient identified their preferred language to be English. Patient reported they does not need the assistance of an  or other support involved in therapy.  Patient reports they are not involved in community of yun activities.  They reports spirituality impacts recovery in the following ways: it does not.     Patient reported had no significant delays in developmental tasks. Patient's highest education level was college graduate. Patient identified the following learning problems: none reported.  Patient reports they are able to understand written materials.    Patient's current relationship status is single for at least 20 years.  Patient " "identified their sexual orientation as heterosexual.  Patient reported having zero children.    Patient's current living/housing situation involves staying in own home/apartment.  The patient lives alone and they report that housing is stable. Patient identified siblings; friends; co-worker as part of their support system.  Patient identified the quality of these relationships as good.      Patient reports engaging in the following recreational/leisure activities: \"I am a big sports and music fan. That is something that consumes a lot of my time. I am a big news junkie.\"  Patient is currently employed fulltime.  Patient reports their income is obtained through employment.  Patient does not identify finances as a current stressor.      Patient reports the following substance related arrests or legal issues: DWI in 2001. They are not under any current court jurisdiction.     Patient's Strengths and Limitations:  Patient identified the following strengths or resources that will help them succeed in treatment: friends / good social support, family support, insight and motivation. Things that may interfere with the patient's success in treatment include: none identified.     Assessments:  The following assessments were completed by patient for this visit:  PHQ9:   PHQ-9 SCORE 10/16/2019 5/3/2022 6/10/2022 6/13/2022   PHQ-9 Total Score MyChart - 5 (Mild depression) 9 (Mild depression) 7 (Mild depression)   PHQ-9 Total Score 6 5 9 7     GAD7:   SHARON-7 SCORE 5/8/2022 6/10/2022 6/10/2022 6/13/2022   Total Score 4 (minimal anxiety) - 11 (moderate anxiety) -   Total Score 4 11 11 6     PROMIS 10-Global Health (all questions and answers displayed):   PROMIS 10 5/8/2022 6/13/2022   In general, would you say your health is: Good Good   In general, would you say your quality of life is: Good Good   In general, how would you rate your physical health? Fair Good   In general, how would you rate your mental health, including your mood " and your ability to think? Good Fair   In general, how would you rate your satisfaction with your social activities and relationships? Fair Good   In general, please rate how well you carry out your usual social activities and roles Good Good   To what extent are you able to carry out your everyday physical activities such as walking, climbing stairs, carrying groceries, or moving a chair? Moderately Completely   How often have you been bothered by emotional problems such as feeling anxious, depressed or irritable? Often Often   How would you rate your fatigue on average? Moderate Moderate   How would you rate your pain on average?   0 = No Pain  to  10 = Worst Imaginable Pain 2 1   In general, would you say your health is: 3 3   In general, would you say your quality of life is: 3 3   In general, how would you rate your physical health? 2 3   In general, how would you rate your mental health, including your mood and your ability to think? 3 2   In general, how would you rate your satisfaction with your social activities and relationships? 2 3   In general, please rate how well you carry out your usual social activities and roles. (This includes activities at home, at work and in your community, and responsibilities as a parent, child, spouse, employee, friend, etc.) 3 3   To what extent are you able to carry out your everyday physical activities such as walking, climbing stairs, carrying groceries, or moving a chair? 3 5   In the past 7 days, how often have you been bothered by emotional problems such as feeling anxious, depressed, or irritable? 4 4   In the past 7 days, how would you rate your fatigue on average? 3 3   In the past 7 days, how would you rate your pain on average, where 0 means no pain, and 10 means worst imaginable pain? 2 1   Global Mental Health Score 10 10   Global Physical Health Score 12 15   PROMIS TOTAL - SUBSCORES 22 25   Some recent data might be hidden     [place] Suicide Severity Rating  "Scale (Short Version)  [place] Suicide Severity Rating (Short Version) 6/13/2022   Q1 Wished to be Dead (Past Month) no   Q2 Suicidal Thoughts (Past Month) no   Q3 Suicidal Thought Method no   Q4 Suicidal Intent without Specific Plan no   Q5 Suicide Intent with Specific Plan no   Q6 Suicide Behavior (Lifetime) yes   Within the Past 3 Months? no   Level of Risk per Screen moderate risk     GAIN-sliding scale:  When was the last time that you had significant problems... 6/13/2022   with feeling very trapped, lonely, sad, blue, depressed or hopeless about the future? Past month   with sleep trouble, such as bad dreams, sleeping restlessly, or falling asleep during the day? Past Month   with feeling very anxious, nervous, tense, scared, panicked or like something bad was going to happen? Past month   with becoming very distressed & upset when something reminded you of the past? Past month   with thinking about ending your life or committing suicide? 1+ years ago      When was the last time that you did the following things 2 or more times? 6/13/2022   Lied or conned to get things you wanted or to avoid having to do something? 2 to 12 months ago   Had a hard time paying attention at school, work or home? 2 to 12 months ago   Had a hard time listening to instructions at school, work or home? 1+ years ago   Were a bully or threatened other people? 1+ years ago   Started physical fights with other people? 1+ years ago       Personal and Family Medical History:  Patient did report a family history of mental health concerns.  Patient reports family history includes Depression in his mother and sister; Substance Abuse in his father.     Patient reported the following previous mental health diagnoses: none reported.  Patient reports their primary mental health symptoms include: \"depression and worrying about dumb stuff\" and these do impact his ability to function. Patient has received mental health services in the past: family " counseling in [employer] [place] and a couple of sessions after his parents , and after his parents passed away.  Psychiatric Hospitalizations: None.  Patient denies a history of civil commitment.  Current mental health services/providers include:  Individual therapy through [facility].    Patient has had a physical exam to rule out medical causes for current symptoms.  Date of last physical exam was within the past year. Client was encouraged to follow up with PCP if symptoms were to develop. The patient has a [facility] Provider, who is named Clifford Evans.  Patient reports no current medical concerns and no current dental concerns.  Patient denies any issues with pain.  There are not significant appetite / nutritional concerns / weight changes.  Patient does not report a history of an eating disorder.  Patient does not report a history of head injury / trauma / cognitive impairment. He suspects a concussion as a child.    Patient reports current meds as:   Outpatient Medications Marked as Taking for the 6/13/22 encounter (Hospital Encounter) with Farida Salazar LADC   Medication Sig     amLODIPine (NORVASC) 5 MG tablet [AMLODIPINE (NORVASC) 5 MG TABLET] TAKE 1 TABLET BY MOUTH EVERY DAY     amoxicillin-clavulanate (AUGMENTIN) 875-125 MG tablet Take 1 tablet by mouth 2 times daily     atorvastatin (LIPITOR) 20 MG tablet [ATORVASTATIN (LIPITOR) 20 MG TABLET] TAKE 1 TABLET BY MOUTH EVERY DAY     citalopram (CELEXA) 20 MG tablet [CITALOPRAM (CELEXA) 20 MG TABLET] TAKE 1 TABLET BY MOUTH EVERY DAY     fluticasone propionate (FLONASE) 50 mcg/actuation nasal spray [FLUTICASONE PROPIONATE (FLONASE) 50 MCG/ACTUATION NASAL SPRAY] SPRAY 2 SPRAYS INTO EACH NOSTRIL EVERY DAY     hydroCHLOROthiazide (HYDRODIURIL) 25 MG tablet [HYDROCHLOROTHIAZIDE (HYDRODIURIL) 25 MG TABLET] Take 1 tablet (25 mg total) by mouth daily.     losartan (COZAAR) 100 MG tablet [LOSARTAN (COZAAR) 100 MG TABLET] Take 1  tablet (100 mg total) by mouth daily.     metFORMIN (GLUCOPHAGE-XR) 500 MG 24 hr tablet Take 2 tablets (1,000 mg) by mouth 2 times daily (with meals)       Medication Adherence:  Patient reports taking prescribed medications as prescribed.    Patient Allergies:    Allergies   Allergen Reactions     No Known Drug Allergies Unknown     Medical History:  Patient Active Problem List   Diagnosis     Obesity     Essential hypertension, benign     Eczema     Acanthosis Nigricans     Generalized anxiety disorder     Mild episode of recurrent major depressive disorder (H)     Bereavement, uncomplicated     Type 2 diabetes mellitus without complication, without long-term current use of insulin (H)     Pure hypercholesterolemia     Healthcare maintenance     Chronic rhinitis     Acute sinusitis with symptoms > 10 days     Substance Use:  Patient reports his sister struggles with addiction and both sides of the family struggle with alcohol.  Patient has not received substance use disorder and/or gambling treatment in the past.  Patient has not ever been to detox.  Patient is not currently receiving any chemical dependency treatment. Patient reports they currently attend the following support groups: AA meetings in the past couple of weeks.      Substance History of use Age of first use Pattern and duration of use (include amounts and frequency) Date of last use     Withdrawal potential Route of administration   Alcohol currently use   15 HU: 30s    Past 15-20 years: drinking most nights and drinking up to 7-10 beers. He is drinking [employer] Light.   6/12/22  7 beers no oral   Cannabis   used in the past 21 Tried it 6/8/1992 no smoke   Amphetamines never used        Cocaine/crack  never used        Hallucinogens never used          Inhalants never used          Heroin never used          Other Opiates never used        Benzodiazepine never used        Barbiturates never used        Over the counter meds never used       "  Caffeine currently use 23 Coffee: 3-4 cups   Tea: occassional 6/13/22 no oral   Nicotine  never used        other substances not listed above:  Identify:  never used          Patient reported the following problems as a result of their substance use:DUI.  Patient is concerned about substance use. Patient reports \"some family members and friends\" are concerned about their substance use.  Patient reports their recovery goals are \"I really do want to seek some sort of treatment. My sister had went to treatment back in February. She is 13 years older than me. I am seeking treatment.\"     Patient reports experiencing the following withdrawal symptoms within the past 12 months: fatigue and the following within the past 30 days: fatigue. Patient report urges to use Alcohol.  Patient reports he has used more Alcohol than intended and over a longer period of time than intended. Patient reports he has had unsuccessful attempts to cut down or control use of Alcohol.  Patient reports longest period of abstinence was 7 months by \"there were just a couple of times I got myself in unsafe situations. I never went to treatment or AA\" and he returned to use was due to \"everything.\" Patient reports he has needed to use more Alcohol to achieve the same effect.  Patient does report diminished effect with use of same amount of Alcohol.     Patient does report a great deal of time is spent in activities necessary to obtain, use, or recover from Alcohol effects.  Patient does report important social, occupational, or recreational activities are given up or reduced because of Alcohol use.  Alcohol use is continued despite knowledge of having a persistent or recurrent physical or psychological problem that is likely to have caused or exacerbated by use.  Patient reports the following problem behaviors while under the influence of substances: driving.     Patient reports substance use has not ever impacted their ability to function in a " "school setting. Patient reports substance use has impacted their ability to function in a work setting.  Patient's demographics and history impact their recovery in the following ways:  His sister is sober.  Patient reports engaging in the following recreation/leisure activities while using: \"social events. Hanging out at the house drinking beer.\"  Patient reports the following people are supportive of recovery: sister.    Patient does not have a history of gambling concerns and/or treatment.  Patient does not have other addictive behaviors he is concerned about.        Dimension Scale Ratings:    Dimension 1 -  Acute Intoxication/Withdrawal: 0 - No Problem Pt reports his last use of alcohol was yesterday.  Dimension 2 - Biomedical: 0 - No Problem Pt reports no current medical or dental concerns outside of what is in his medical chart.  Dimension 3 - Emotional/Behavioral/Cognitive Conditions: 2 - Moderate Problem Pt reports no formal mental health diagnosis at this time. Patient reports their primary mental health symptoms include: \"depression and worrying about dumb stuff\" and these do impact his ability to function.  He is working with a mental health therapist.  Dimension 4 - Readiness to Change:  1 - Minor Problem Pt wants to attend treatment and stop drinking.   Dimension 5 - Relapse/Continued Use/ Continued Problem Potential: 4 - Extreme Problem Pt has never attended a [facility] treatment program before. The longest sobriety from alcohol was 7 months following placing himself in unsafe situations. For the past 15-20 years, pt has drank the same amount of alcohol.  Dimension 6 - Recovery Environment:  2 - Moderate Problem Pt is employed full time. He has his own place. His sister is sober and supportive of him.    Significant Losses / Trauma / Abuse / Neglect Issues:   Patient did not serve in the .  There are indications or report of significant loss, trauma, abuse or neglect issues related to: \"loss of " "family members. I have a fairly significant death\" of his uncle in front of him and his mother when he was 20. Loss of job.  Concerns for possible neglect are not present.     Safety Assessment:   Patient denies current homicidal ideation and behaviors.  Patient denies current self-injurious ideation and behaviors.    Patient reported unsafe motor vehicle operation associated with substance use.  Patient reported substance use associated with mental health symptoms.  Patient reports the following current concerns for their personal safety: None.  Patient reports there are not firearms in the house.     History of Safety Concerns:  Patient denied a history of homicidal ideation.     Patient denied a history of personal safety concerns.    Patient denied a history of assaultive behaviors.    Patient denied a history of sexual assault behaviors.     Patient reported a history unsafe motor vehicle operation associated with substance use.  Patient reported a history of substance use associated with mental health symptoms.  Patient reports the following protective factors: forward or future oriented thinking; dedication to family or friends; daily obligations; financial stability    Risk Plan:  See Recommendations for Safety and Risk Management Plan    Review of Symptoms per patient report:  Substance Use:  hangovers, daily use, work absence due to substance use, family relationship problems due to substance use and driving under the influence     Collateral Contact Summary:   Collateral contacts contributing to this assessment:    1) [facility] EMR:  The patient's medical record at [facility] was reviewed and the information contained in the medical record supported the patient's account of his chemical use history and chemical use consequences.    If court related records were reviewed, summarize here: NA    Information from collateral contacts supported/largely agreed with information from the client and " associated risk ratings.    Information in this assessment was obtained from the medical record and provided by patient who is a good historian.    Patient will have open access to their mental health medical record.    Diagnostic Criteria:  2.) There is a persistent desire or unsuccessful efforts to cut down or control alcohol/drug use.  Met for Alcohol.  3.) A great deal of time is spent in activities necessary to obtain alcohol, use alcohol, or recover from its effects.  Met for Alcohol.  4.) Craving, or a strong desire or urge to use alcohol/drug.  Met for Alcohol.  6.) Continued alcohol use despite having persistent or recurrent social or interpersonal problems caused or exacerbated by the effects of alcohol/drug.  Met for Alcohol.  7.) Important social, occupational, or recreational activities are given up or reduced because of alcohol/drug use.  Met for Alcohol.  8.) Recurrent alcohol/drug use in situations in which it is physically hazardous.  Met for Alcohol.  9.) Alcohol/drug use is continued despite knowledge of having a persistent or recurrent physical or psychological problem that is likely to have been caused or exacerbated by alcohol.  Met for Alcohol.  10.) Tolerance, as defined by either of the following: A need for markedly increased amounts of alcohol/drug to achieve intoxication or desired effect..  Met for Alcohol.  11.) Withdrawal, as manifested by either of the following: The characteristic withdrawal syndrome for alcohol/drug (refer to Criteria A and B of the criteria set for alcohol/drug withdrawal).. Met for Alcohol.     As evidenced by self report and criteria, client meets the following DSM5 Diagnoses:   (Sustained by DSM5 Criteria Listed Above)    Category of Substance Severity (ICD-10 Code / DSM 5 Code)     Alcohol Use Disorder Severe  (10.20) (303.90)   Cannabis Use Disorder The patient does not meet the criteria for a Cannabis use disorder.   Hallucinogen Use Disorder The patient does  not meet the criteria for a Hallucinogen use disorder.   Inhalant Use Disorder The patient does not meet the criteria for an Inhalant use disorder.   Opioid Use Disorder The patient does not meet the criteria for an Opioid use disorder.   Sedative, Hypnotic, or Anxiolytic Use Disorder The patient does not meet the criteria for a Sedative/Hypnotic use disorder.   Stimulant Related Disorder The patient does not meet the criteria for a Stimulant use disorder.   Tobacco Use Disorder The patient does not meet the criteria for a Tobacco use disorder.   Other (or unknown) Substance Use Disorder The patient does not meet the criteria for a Other (or unknown) Substance use disorder.     Recommendations:     1. Plan for Safety and Risk Management:  Recommended that patient call 911 or go to the local ED should there be a change in any of these risk factors. Report to child / adult protection services was NA.     2. [facility] Recommendations:    1)  Complete a residential based or similar treatment program.  2)  Abstain from all mood-altering chemicals unless prescribed by a licensed provider.   3)  Attend, at minimum, 2 weekly support group meetings, such as 12 step based (AA/NA), SMART Recovery, Health Realizations, and/or Refuge Recovery meetings.     4)  Actively work with a male mentor/sponsor on a weekly basis.   5)  Follow all the recommendations of your treatment/medical providers.  6)  Continue to attend your scheduled individual psychotherapy appointments.       Patient reports they are willing to follow these recommendations.  Patient would like the following family or other support people involved in their treatment:  NA. Patient does not have a history of opiate use.    3. [facility] Referrals:    [facility]: [email or web address]/treatment/[email or web address]/[email or web address]  [facility]: https://[facility].[email or web address]/  [facility]: [email or web address]/residential-treatment-[email or web address]-[facility]/  [facility]:   "  https://[email or web address]/overarching-care/behavioral-health-services/lodging-plus-residential-program  [facility]: https://[facility]/      4. Clinical Substantiation:  For the past 15-20 years pt has been drinking upwards of 7-10 [employer] Light beers on a daily basis. The longest he has gone without alcohol has been 7 months because \"there were just a couple of times I got myself in unsafe situations.\" He reports he drinks if he has had a bad day or a good day. Pt is attempting to do individual therapy, but all of his sessions revolve around his drinking. He stated he needs to address his drinking before he can work on his mental health concerns.              Provider Name/ Credentials:  Farida Bah MA [facility]  June 13, 2022              "

## 2022-06-25 ENCOUNTER — HEALTH MAINTENANCE LETTER (OUTPATIENT)
Age: 52
End: 2022-06-25

## 2022-08-25 ENCOUNTER — TRANSFERRED RECORDS (OUTPATIENT)
Dept: HEALTH INFORMATION MANAGEMENT | Facility: CLINIC | Age: 52
End: 2022-08-25

## 2022-08-25 LAB — RETINOPATHY: NORMAL

## 2022-09-01 ENCOUNTER — VIRTUAL VISIT (OUTPATIENT)
Dept: PSYCHOLOGY | Facility: CLINIC | Age: 52
End: 2022-09-01
Payer: COMMERCIAL

## 2022-09-01 DIAGNOSIS — F33.1 MODERATE EPISODE OF RECURRENT MAJOR DEPRESSIVE DISORDER (H): Primary | ICD-10-CM

## 2022-09-01 PROCEDURE — 90834 PSYTX W PT 45 MINUTES: CPT | Mod: 95 | Performed by: SOCIAL WORKER

## 2022-09-01 ASSESSMENT — ANXIETY QUESTIONNAIRES
2. NOT BEING ABLE TO STOP OR CONTROL WORRYING: NEARLY EVERY DAY
3. WORRYING TOO MUCH ABOUT DIFFERENT THINGS: NEARLY EVERY DAY
7. FEELING AFRAID AS IF SOMETHING AWFUL MIGHT HAPPEN: MORE THAN HALF THE DAYS
7. FEELING AFRAID AS IF SOMETHING AWFUL MIGHT HAPPEN: MORE THAN HALF THE DAYS
GAD7 TOTAL SCORE: 16
1. FEELING NERVOUS, ANXIOUS, OR ON EDGE: NEARLY EVERY DAY
5. BEING SO RESTLESS THAT IT IS HARD TO SIT STILL: SEVERAL DAYS
6. BECOMING EASILY ANNOYED OR IRRITABLE: MORE THAN HALF THE DAYS
GAD7 TOTAL SCORE: 16
4. TROUBLE RELAXING: MORE THAN HALF THE DAYS
GAD7 TOTAL SCORE: 16

## 2022-09-01 NOTE — PROGRESS NOTES
[facility]                                     Progress Note    Patient Name: Grant Slaughter  Date: 2022         Service Type: Individual      Session Start Time: 2pm  Session End Time: 2:45pm     Session Length: 45 minutes    Session #: 4    Attendees: Client attended alone    Service Modality:  Video Visit:      Provider verified identity through the following two step process.  Patient provided:  Patient     Telemedicine Visit: The patient's condition can be safely assessed and treated via synchronous audio and visual telemedicine encounter.      Reason for Telemedicine Visit: Services only offered telehealth    Originating Site (Patient Location): Patient's home    Distant Site (Provider Location): Provider Remote Setting- Home Office    Consent:  The patient/guardian has verbally consented to: the potential risks and benefits of telemedicine (video visit) versus in person care; bill my insurance or make self-payment for services provided; and responsibility for payment of non-covered services.     Patient would like the video invitation sent by:  My Chart    Mode of Communication:  Video Conference via Amwell    As the provider I attest to compliance with applicable laws and regulations related to telemedicine.    DATA  Interactive Complexity: No  Crisis: No        Progress Since Last Session (Related to Symptoms / Goals / Homework):   Symptoms: improvement, some stress but has been in CD recovery treatment for 2+ months    Homework: Achieved / completed to satisfaction      Episode of Care Goals: Minimal progress - PREPARATION (Decided to change - considering how); Intervened by negotiating a change plan and determining options / strategies for behavior change, identifying triggers, exploring social supports, and working towards setting a date to begin behavior change     Current / Ongoing Stressors and Concerns:   Client reports he is completing his IOP CD program in a couple of  weeks. Re establishing outpatient mental health support. Working on setting up structures in his life to help him maintain recovery progress.      Treatment Objective(s) Addressed in This Session:   Client will Decrease frequency and intensity of feeling down, depressed, hopeless  Client will identify triggers to depression  Identify negative self-talk and behaviors: challenge core beliefs, myths, and actions.  Client will identify 4-6 effective coping strategies for managing depression symptoms     Intervention:   Solution Focused: reviewed symptoms and recovery/relapse prevention strategies he is working through    Assessments completed prior to visit:  The following assessments were completed by patient for this visit:  PHQ9:   PHQ-9 SCORE 10/16/2019 5/3/2022 6/10/2022 6/13/2022   PHQ-9 Total Score MyChart - 5 (Mild depression) 9 (Mild depression) 7 (Mild depression)   PHQ-9 Total Score 6 5 9 -   Some encounter information is confidential and restricted. Go to Review Flowsheets activity to see all data.     GAD7:   SHARON-7 SCORE 5/8/2022 6/10/2022 9/1/2022   Total Score 4 (minimal anxiety) 11 (moderate anxiety) 16 (severe anxiety)   Total Score 4 11 16   Some encounter information is confidential and restricted. Go to Review Flowsheets activity to see all data.         ASSESSMENT: Current Emotional / Mental Status (status of significant symptoms):   Risk status (Self / Other harm or suicidal ideation)   Patient denies current fears or concerns for personal safety.   Patient denies current or recent suicidal ideation or behaviors.   Patient denies current or recent homicidal ideation or behaviors.   Patient denies current or recent self injurious behavior or ideation.   Patient denies other safety concerns.   Patient reports there has been no change in risk factors since their last session.     Patient reports there has been no change in protective factors since their last session.     Recommended that patient call  911 or go to the local ED should there be a change in any of these risk factors.     Appearance:   Appropriate    Eye Contact:   Good    Psychomotor Behavior: Normal    Attitude:   Cooperative    Orientation:   All   Speech    Rate / Production: Normal     Volume:  Normal    Mood:    Depressed    Affect:    Flat    Thought Content:  Clear    Thought Form:  Coherent  Logical    Insight:    Good      Medication Review:   No changes to current psychiatric medication(s)     Medication Compliance:   Yes     Changes in Health Issues:   None reported     Chemical Use Review:   Substance Use: Problem use continues with no change since last session, Referred client for formal CD evaluation        Tobacco Use: No current tobacco use.      Diagnosis:  1. Moderate episode of recurrent major depressive disorder (H)        Collateral Reports Completed:   Not Applicable    PLAN: (Patient Tasks / Therapist Tasks / Other)  Client referred to AA and for CD evaluation; will follow recommendations of CD evaluation he has scheduled for monday        YURIDIA Hodgson, [facility]   9/1/2022                                                         ______________________________________________________________________    Individual Treatment Plan    Patient's Name: Grant Slaughter  YOB: 1970    Date of Creation: 9/1/2022  Date Treatment Plan Last Reviewed/Revised:     Diagnosis:  1. Mild episode of recurrent major depressive disorder (H)      Psychosocial / Contextual Factors: work stress  PROMIS (reviewed every 90 days):     Referral / Collaboration:  The following referral(s) will be initiated: CD eval.    Anticipated number of session for this episode of care: 10-15  Anticipation frequency of session: Weekly  Anticipated Duration of each session: 38-52 minutes  Treatment plan will be reviewed in 90 days or when goals have been changed.       MeasurableTreatment Goal(s) related to diagnosis / functional impairment(s)  Goal 1:  Client will experience decreased depression symptoms evidenced by PHQ9 score below 5 and report increased ability to manage depression.    Objective #A (Client Action)    Client will Decrease frequency and intensity of feeling down, depressed, hopeless  Client will identify triggers to depression  Identify negative self-talk and behaviors: challenge core beliefs, myths, and actions.  Client will identify 4-6 effective coping strategies for managing depression symptoms    Status: New - Date: 5/26/2022, continue 9/1/2022    Intervention(s)  Therapist will teach CBT and DBT skills.      Patient has reviewed and agreed to the above plan.      YURIDIA Hodgson, [facility]  May 26, 2022, 9/1/2022

## 2022-09-09 PROBLEM — F10.20 MODERATE ALCOHOL USE DISORDER (H): Status: ACTIVE | Noted: 2018-05-04

## 2022-09-28 ENCOUNTER — VIRTUAL VISIT (OUTPATIENT)
Dept: PSYCHOLOGY | Facility: CLINIC | Age: 52
End: 2022-09-28
Payer: COMMERCIAL

## 2022-09-28 DIAGNOSIS — F33.1 MODERATE EPISODE OF RECURRENT MAJOR DEPRESSIVE DISORDER (H): Primary | ICD-10-CM

## 2022-09-28 PROCEDURE — 90834 PSYTX W PT 45 MINUTES: CPT | Mod: 95 | Performed by: SOCIAL WORKER

## 2022-09-28 NOTE — PROGRESS NOTES
[facility]                                     Progress Note    Patient Name: Grant Slaughter  Date: 2022         Service Type: Individual      Session Start Time: 4pm  Session End Time: 4:45pm     Session Length: 45 minutes    Session #: 5    Attendees: Client attended alone    Service Modality:  Video Visit:      Provider verified identity through the following two step process.  Patient provided:  Patient     Telemedicine Visit: The patient's condition can be safely assessed and treated via synchronous audio and visual telemedicine encounter.      Reason for Telemedicine Visit: Services only offered telehealth    Originating Site (Patient Location): Patient's home    Distant Site (Provider Location): Provider Remote Setting- Home Office    Consent:  The patient/guardian has verbally consented to: the potential risks and benefits of telemedicine (video visit) versus in person care; bill my insurance or make self-payment for services provided; and responsibility for payment of non-covered services.     Patient would like the video invitation sent by:  My Chart    Mode of Communication:  Video Conference via Amwell    As the provider I attest to compliance with applicable laws and regulations related to telemedicine.    DATA  Interactive Complexity: No  Crisis: No        Progress Since Last Session (Related to Symptoms / Goals / Homework):   Symptoms: improvement, some stress but well managed currently    Homework: Achieved / completed to satisfaction      Episode of Care Goals: Minimal progress - PREPARATION (Decided to change - considering how); Intervened by negotiating a change plan and determining options / strategies for behavior change, identifying triggers, exploring social supports, and working towards setting a date to begin behavior change     Current / Ongoing Stressors and Concerns:   Client reports he is working on setting up structures in his life to help him maintain  recovery progress. Has been connecting with family and started back at work. Sobriety has been well maintained.     Treatment Objective(s) Addressed in This Session:   Client will Decrease frequency and intensity of feeling down, depressed, hopeless  Client will identify triggers to depression  Identify negative self-talk and behaviors: challenge core beliefs, myths, and actions.  Client will identify 4-6 effective coping strategies for managing depression symptoms     Intervention:   Solution Focused: reviewed symptoms and recovery/relapse prevention strategies he is working through    Assessments completed prior to visit:  The following assessments were completed by patient for this visit:  PHQ9:   PHQ-9 SCORE 10/16/2019 5/3/2022 6/10/2022 6/13/2022   PHQ-9 Total Score MyChart - 5 (Mild depression) 9 (Mild depression) 7 (Mild depression)   PHQ-9 Total Score 6 5 9 -   Some encounter information is confidential and restricted. Go to Review Flowsheets activity to see all data.     GAD7:   SHARON-7 SCORE 5/8/2022 6/10/2022 9/1/2022   Total Score 4 (minimal anxiety) 11 (moderate anxiety) 16 (severe anxiety)   Total Score 4 11 16   Some encounter information is confidential and restricted. Go to Review Flowsheets activity to see all data.         ASSESSMENT: Current Emotional / Mental Status (status of significant symptoms):   Risk status (Self / Other harm or suicidal ideation)   Patient denies current fears or concerns for personal safety.   Patient denies current or recent suicidal ideation or behaviors.   Patient denies current or recent homicidal ideation or behaviors.   Patient denies current or recent self injurious behavior or ideation.   Patient denies other safety concerns.   Patient reports there has been no change in risk factors since their last session.     Patient reports there has been no change in protective factors since their last session.     Recommended that patient call 911 or go to the local ED  should there be a change in any of these risk factors.     Appearance:   Appropriate    Eye Contact:   Good    Psychomotor Behavior: Normal    Attitude:   Cooperative    Orientation:   All   Speech    Rate / Production: Normal     Volume:  Normal    Mood:    Depressed    Affect:    Flat    Thought Content:  Clear    Thought Form:  Coherent  Logical    Insight:    Good      Medication Review:   No changes to current psychiatric medication(s)     Medication Compliance:   Yes     Changes in Health Issues:   None reported     Chemical Use Review:   Substance Use: Problem use continues with no change since last session, Referred client for formal CD evaluation        Tobacco Use: No current tobacco use.      Diagnosis:  1. Moderate episode of recurrent major depressive disorder (H)        Collateral Reports Completed:   Not Applicable    PLAN: (Patient Tasks / Therapist Tasks / Other)  Client to continue utilizing sobriety resources; continue with his structured connections with family and friends        YURIDIA Hodgson, [facility]   9/28/2022                                                         ______________________________________________________________________    Individual Treatment Plan    Patient's Name: Grant Slaughter  YOB: 1970    Date of Creation: 9/1/2022  Date Treatment Plan Last Reviewed/Revised:     Diagnosis:  1. Mild episode of recurrent major depressive disorder (H)      Psychosocial / Contextual Factors: work stress  PROMIS (reviewed every 90 days):     Referral / Collaboration:  The following referral(s) will be initiated: CD eval.    Anticipated number of session for this episode of care: 10-15  Anticipation frequency of session: Weekly  Anticipated Duration of each session: 38-52 minutes  Treatment plan will be reviewed in 90 days or when goals have been changed.       MeasurableTreatment Goal(s) related to diagnosis / functional impairment(s)  Goal 1: Client will experience decreased  depression symptoms evidenced by PHQ9 score below 5 and report increased ability to manage depression.    Objective #A (Client Action)    Client will Decrease frequency and intensity of feeling down, depressed, hopeless  Client will identify triggers to depression  Identify negative self-talk and behaviors: challenge core beliefs, myths, and actions.  Client will identify 4-6 effective coping strategies for managing depression symptoms    Status: New - Date: 5/26/2022, continue 9/1/2022    Intervention(s)  Therapist will teach CBT and DBT skills.      Patient has reviewed and agreed to the above plan.      YURIDIA Hodgson, LICSW  May 26, 2022, 9/1/2022

## 2022-10-04 ENCOUNTER — OFFICE VISIT (OUTPATIENT)
Dept: FAMILY MEDICINE | Facility: CLINIC | Age: 52
End: 2022-10-04
Payer: COMMERCIAL

## 2022-10-04 VITALS
TEMPERATURE: 98.6 F | WEIGHT: 200 LBS | HEIGHT: 71 IN | SYSTOLIC BLOOD PRESSURE: 123 MMHG | RESPIRATION RATE: 18 BRPM | HEART RATE: 84 BPM | DIASTOLIC BLOOD PRESSURE: 79 MMHG | BODY MASS INDEX: 28 KG/M2

## 2022-10-04 DIAGNOSIS — Z00.00 HEALTHCARE MAINTENANCE: ICD-10-CM

## 2022-10-04 DIAGNOSIS — F41.0 PANIC DISORDER WITHOUT AGORAPHOBIA: ICD-10-CM

## 2022-10-04 DIAGNOSIS — I10 ESSENTIAL HYPERTENSION, BENIGN: ICD-10-CM

## 2022-10-04 DIAGNOSIS — E11.9 TYPE 2 DIABETES MELLITUS WITHOUT COMPLICATION, WITHOUT LONG-TERM CURRENT USE OF INSULIN (H): Primary | ICD-10-CM

## 2022-10-04 DIAGNOSIS — F41.1 GENERALIZED ANXIETY DISORDER: ICD-10-CM

## 2022-10-04 DIAGNOSIS — F10.20 MODERATE ALCOHOL USE DISORDER (H): ICD-10-CM

## 2022-10-04 LAB — HBA1C MFR BLD: 7 % (ref 0–5.6)

## 2022-10-04 PROCEDURE — 0124A COVID-19,PF,PFIZER BOOSTER BIVALENT: CPT | Performed by: FAMILY MEDICINE

## 2022-10-04 PROCEDURE — 83036 HEMOGLOBIN GLYCOSYLATED A1C: CPT | Performed by: FAMILY MEDICINE

## 2022-10-04 PROCEDURE — 90682 RIV4 VACC RECOMBINANT DNA IM: CPT | Performed by: FAMILY MEDICINE

## 2022-10-04 PROCEDURE — 90677 PCV20 VACCINE IM: CPT | Performed by: FAMILY MEDICINE

## 2022-10-04 PROCEDURE — 99214 OFFICE O/P EST MOD 30 MIN: CPT | Mod: 25 | Performed by: FAMILY MEDICINE

## 2022-10-04 PROCEDURE — 90472 IMMUNIZATION ADMIN EACH ADD: CPT | Performed by: FAMILY MEDICINE

## 2022-10-04 PROCEDURE — 36415 COLL VENOUS BLD VENIPUNCTURE: CPT | Performed by: FAMILY MEDICINE

## 2022-10-04 PROCEDURE — 90471 IMMUNIZATION ADMIN: CPT | Performed by: FAMILY MEDICINE

## 2022-10-04 PROCEDURE — 91312 COVID-19,PF,PFIZER BOOSTER BIVALENT: CPT | Performed by: FAMILY MEDICINE

## 2022-10-04 RX ORDER — NALTREXONE HYDROCHLORIDE 50 MG/1
50 TABLET, FILM COATED ORAL DAILY
Qty: 90 TABLET | Refills: 1 | Status: SHIPPED | OUTPATIENT
Start: 2022-10-04 | End: 2024-04-30

## 2022-10-04 ASSESSMENT — PATIENT HEALTH QUESTIONNAIRE - PHQ9
SUM OF ALL RESPONSES TO PHQ QUESTIONS 1-9: 9
SUM OF ALL RESPONSES TO PHQ QUESTIONS 1-9: 9
10. IF YOU CHECKED OFF ANY PROBLEMS, HOW DIFFICULT HAVE THESE PROBLEMS MADE IT FOR YOU TO DO YOUR WORK, TAKE CARE OF THINGS AT HOME, OR GET ALONG WITH OTHER PEOPLE: VERY DIFFICULT

## 2022-10-04 ASSESSMENT — ANXIETY QUESTIONNAIRES
3. WORRYING TOO MUCH ABOUT DIFFERENT THINGS: SEVERAL DAYS
GAD7 TOTAL SCORE: 7
IF YOU CHECKED OFF ANY PROBLEMS ON THIS QUESTIONNAIRE, HOW DIFFICULT HAVE THESE PROBLEMS MADE IT FOR YOU TO DO YOUR WORK, TAKE CARE OF THINGS AT HOME, OR GET ALONG WITH OTHER PEOPLE: VERY DIFFICULT
1. FEELING NERVOUS, ANXIOUS, OR ON EDGE: SEVERAL DAYS
8. IF YOU CHECKED OFF ANY PROBLEMS, HOW DIFFICULT HAVE THESE MADE IT FOR YOU TO DO YOUR WORK, TAKE CARE OF THINGS AT HOME, OR GET ALONG WITH OTHER PEOPLE?: VERY DIFFICULT
GAD7 TOTAL SCORE: 7
2. NOT BEING ABLE TO STOP OR CONTROL WORRYING: SEVERAL DAYS
6. BECOMING EASILY ANNOYED OR IRRITABLE: SEVERAL DAYS
7. FEELING AFRAID AS IF SOMETHING AWFUL MIGHT HAPPEN: SEVERAL DAYS
4. TROUBLE RELAXING: SEVERAL DAYS
7. FEELING AFRAID AS IF SOMETHING AWFUL MIGHT HAPPEN: SEVERAL DAYS
GAD7 TOTAL SCORE: 7
5. BEING SO RESTLESS THAT IT IS HARD TO SIT STILL: SEVERAL DAYS

## 2022-10-04 NOTE — ASSESSMENT & PLAN NOTE
.  DMII  controlled  Hemoglobin A1C   Date Value Ref Range Status   10/04/2022 7.0 (H) 0.0 - 5.6 % Final     Comment:     Normal <5.7%   Prediabetes 5.7-6.4%    Diabetes 6.5% or higher     Note: Adopted from ADA consensus guidelines.      Diabetes meds: Metformin thousand twice daily      Checking blood sugars? No  none      Additional diabetes objectives reviewed   Statin medication (>39, 10 year risk > 7.5%) Yes: Atorvastatin 20  Blood pressure goal Yes  BP Readings from Last 6 Encounters:   10/04/22 123/79   05/03/22 106/73   04/16/21 139/89   03/15/20 106/75   10/16/19 134/78   01/02/06 186/110      Lab Results   Component Value Date    MICROALBUR 1.42 05/03/2022     Ace/ARB if indicated-Yes: Losartan 100    Yearly dilated retina evaluation -Yes: Optometry    Plan: No change

## 2022-10-04 NOTE — ASSESSMENT & PLAN NOTE
Symptoms, on average 3 times per week.  Unclear whether this is generalized anxiety or full-blown panic.  Trigger for wanting to drink alcohol    On citalopram, will change to sertraline, follow-up 6 weeks.  Continue to work with therapist

## 2022-10-04 NOTE — ASSESSMENT & PLAN NOTE
Recovering, went through treatment, has relapsed a couple of times since then.  Encouraged him to maintain sobriety.  Requesting naltrexone to reduce cravings.  I agreed with this.  Follow-up 6 weeks

## 2022-10-04 NOTE — PROGRESS NOTES
Assessment/ Plan  Type 2 diabetes mellitus without complication, without long-term current use of insulin (H)  .  DMII  controlled  Hemoglobin A1C   Date Value Ref Range Status   10/04/2022 7.0 (H) 0.0 - 5.6 % Final     Comment:     Normal <5.7%   Prediabetes 5.7-6.4%    Diabetes 6.5% or higher     Note: Adopted from ADA consensus guidelines.      Diabetes meds: Metformin thousand twice daily      Checking blood sugars? No  none      Additional diabetes objectives reviewed   Statin medication (>39, 10 year risk > 7.5%) Yes: Atorvastatin 20  Blood pressure goal Yes  BP Readings from Last 6 Encounters:   10/04/22 123/79   05/03/22 106/73   04/16/21 139/89   03/15/20 106/75   10/16/19 134/78   01/02/06 186/110      Lab Results   Component Value Date    MICROALBUR 1.42 05/03/2022     Ace/ARB if indicated-Yes: Losartan 100    Yearly dilated retina evaluation -Yes: Optometry    Plan: No change          Panic disorder without agoraphobia  Symptoms, on average 3 times per week.  Unclear whether this is generalized anxiety or full-blown panic.  Trigger for wanting to drink alcohol    On citalopram, will change to sertraline, follow-up 6 weeks.  Continue to work with therapist        Moderate alcohol use disorder (H)  Recovering, went through treatment, has relapsed a couple of times since then.  Encouraged him to maintain sobriety.  Requesting naltrexone to reduce cravings.  I agreed with this.  Follow-up 6 weeks    Healthcare maintenance  Pneumo 20, flu, COVID    Generalized anxiety disorder  Symptoms, on average 3 times per week.  Unclear whether this is generalized anxiety or full-blown panic.  Trigger for wanting to drink alcohol    On citalopram, will change to sertraline, follow-up 6 weeks.  Continue to work with therapist    Essential hypertension, benign  Well-controlled, hydrochlorothiazide, losartan 100, amlodipine 5     Subjective  CC:  chief complaint  HPI:  History of Present Illness   Here for a number of  reasons.  Mental health follow-up as below  Went through treatment for alcoholism after seeing therapist last time.    Also, wants follow-up for diabetes, last A1c was 8.0.  Has lost significant amount of weight since this time.  Trying to eat better    Mental Health Follow-up:  Patient presents to follow-up on Depression & Anxiety.Patient's depression since last visit has been:  Worse  The patient is having other symptoms associated with depression.  Patient's anxiety since last visit has been:  Worse  The patient is having other symptoms associated with anxiety.  Any significant life events: relationship concerns, job concerns and health concerns  Patient is feeling anxious or having panic attacks.  Patient has concerns about alcohol or drug use.    Diabetes:   He presents for follow up of diabetes.  He is not checking blood glucose. He has no concerns regarding his diabetes at this time.  He is not experiencing numbness or burning in feet, excessive thirst, blurry vision, weight changes or redness, sores or blisters on feet. The patient has had a diabetic eye exam in the last 12 months. Eye exam performed on 08/25/2022. Location of last eye exam [facility].        Hypertension: He presents for follow up of hypertension.  He does not check blood pressure  regularly outside of the clinic. Outpatient blood pressures have not been over 140/90. He follows a low salt diet.      Today's PHQ-9         PHQ-9 Total Score: 9    PHQ-9 Q9 Thoughts of better off dead/self-harm past 2 weeks :   Not at all    How difficult have these problems made it for you to do your work, take care of things at home, or get along with other people: Very difficult  Today's SHARON-7 Score: 7  PFSH:  Patient Active Problem List   Diagnosis     Obesity     Essential hypertension, benign     Eczema     Acanthosis Nigricans     Generalized anxiety disorder     Mild episode of recurrent major depressive disorder (H)     Bereavement,  uncomplicated     Type 2 diabetes mellitus without complication, without long-term current use of insulin (H)     Pure hypercholesterolemia     Healthcare maintenance     Chronic rhinitis     Acute sinusitis with symptoms > 10 days     Cough due to ACE inhibitor     Dyslipidemia     Eczema     Hypertension     Moderate alcohol use disorder (H)     Panic disorder without agoraphobia     amLODIPine (NORVASC) 5 MG tablet, [AMLODIPINE (NORVASC) 5 MG TABLET] TAKE 1 TABLET BY MOUTH EVERY DAY  amoxicillin-clavulanate (AUGMENTIN) 875-125 MG tablet, Take 1 tablet by mouth 2 times daily  atorvastatin (LIPITOR) 20 MG tablet, [ATORVASTATIN (LIPITOR) 20 MG TABLET] TAKE 1 TABLET BY MOUTH EVERY DAY  citalopram (CELEXA) 20 MG tablet, [CITALOPRAM (CELEXA) 20 MG TABLET] TAKE 1 TABLET BY MOUTH EVERY DAY  fluticasone propionate (FLONASE) 50 mcg/actuation nasal spray, [FLUTICASONE PROPIONATE (FLONASE) 50 MCG/ACTUATION NASAL SPRAY] SPRAY 2 SPRAYS INTO EACH NOSTRIL EVERY DAY  hydrochlorothiazide (HYDRODIURIL) 25 MG tablet, Take 1 tablet (25 mg) by mouth daily  hydroCHLOROthiazide (HYDRODIURIL) 25 MG tablet, [HYDROCHLOROTHIAZIDE (HYDRODIURIL) 25 MG TABLET] Take 1 tablet (25 mg total) by mouth daily.  losartan (COZAAR) 100 MG tablet, Take 1 tablet (100 mg) by mouth daily  losartan (COZAAR) 100 MG tablet, [LOSARTAN (COZAAR) 100 MG TABLET] Take 1 tablet (100 mg total) by mouth daily.  metFORMIN (GLUCOPHAGE-XR) 500 MG 24 hr tablet, Take 2 tablets (1,000 mg) by mouth 2 times daily (with meals)    No current facility-administered medications on file prior to visit.       History   Smoking Status     Never Smoker   Smokeless Tobacco     Never Used     Social History     Social History Narrative     Not on file     Patient Care Team:  Clifford Evans MD as PCP - General (Family Medicine)  Clifford Evans MD as Assigned PCP  ROS  As above      Objective  Physical Exam  Vitals:    10/04/22 1635   BP: 123/79   BP Location: Left arm   Patient  "Position: Sitting   Cuff Size: Adult Regular   Pulse: 84   Resp: 18   Temp: 98.6  F (37  C)   TempSrc: Temporal   Weight: 90.7 kg (200 lb)   Height: 1.803 m (5' 10.98\")     Body mass index is 27.91 kg/m .  Gen- alert, oriented/ appropriately responsive  HEENT- normal cephalic, atraumatic.   Chest- Normal inspiration and expiration.    Clear to ascultation.    No chest wall deformity or scar.  CV- Heart regular rate and rhythm  normal tones, no murmurs   No gallops or rubs.  Ext- appear well perfused, no edema  Skin- warm and dry,   no visualized rash    Diagnostics:   Results for orders placed or performed in visit on 10/04/22   Hemoglobin A1c     Status: Abnormal   Result Value Ref Range    Hemoglobin A1C 7.0 (H) 0.0 - 5.6 %           Please note: Voice recognition software was used in this dictation.  It may therefore contain typographical errors.        Answers for HPI/ROS submitted by the patient on 10/4/2022  If you checked off any problems, how difficult have these problems made it for you to do your work, take care of things at home, or get along with other people?: Very difficult  PHQ9 TOTAL SCORE: 9  SHARON 7 TOTAL SCORE: 7  Do you check your blood pressure regularly outside of the clinic?: No  Are your blood pressures ever more than 140 on the top number (systolic) OR more than 90 on the bottom number (diastolic)? (For example, greater than 140/90): No  Are you following a low salt diet?: Yes  Frequency of checking blood sugars:: not at all  Diabetic concerns:: none  Paraesthesia present:: none of these symptoms  Have you had a diabetic eye exam within the last year?: Yes  Date of last eye exam:: 08/25/2022  Where was this eye exam done?: [facility]  Depression/Anxiety: Depression & Anxiety  Status since last visit:: worse  Anxiety since last: : worse  Other associated symptoms of depression:: Yes  Other associated symotome: : Yes  Significant life event: : relationship concerns, job concerns, health " concerns  Anxious:: Yes  Current substance use:: Yes

## 2022-10-26 ENCOUNTER — VIRTUAL VISIT (OUTPATIENT)
Dept: PSYCHOLOGY | Facility: CLINIC | Age: 52
End: 2022-10-26
Payer: COMMERCIAL

## 2022-10-26 DIAGNOSIS — F33.1 MODERATE EPISODE OF RECURRENT MAJOR DEPRESSIVE DISORDER (H): Primary | ICD-10-CM

## 2022-10-26 DIAGNOSIS — F10.21 ALCOHOL DEPENDENCE IN REMISSION (H): ICD-10-CM

## 2022-10-26 PROCEDURE — 90834 PSYTX W PT 45 MINUTES: CPT | Mod: 95 | Performed by: SOCIAL WORKER

## 2022-10-26 ASSESSMENT — PATIENT HEALTH QUESTIONNAIRE - PHQ9
10. IF YOU CHECKED OFF ANY PROBLEMS, HOW DIFFICULT HAVE THESE PROBLEMS MADE IT FOR YOU TO DO YOUR WORK, TAKE CARE OF THINGS AT HOME, OR GET ALONG WITH OTHER PEOPLE: VERY DIFFICULT
SUM OF ALL RESPONSES TO PHQ QUESTIONS 1-9: 10
SUM OF ALL RESPONSES TO PHQ QUESTIONS 1-9: 10

## 2022-10-26 ASSESSMENT — ANXIETY QUESTIONNAIRES
GAD7 TOTAL SCORE: 11
2. NOT BEING ABLE TO STOP OR CONTROL WORRYING: MORE THAN HALF THE DAYS
3. WORRYING TOO MUCH ABOUT DIFFERENT THINGS: MORE THAN HALF THE DAYS
1. FEELING NERVOUS, ANXIOUS, OR ON EDGE: MORE THAN HALF THE DAYS
GAD7 TOTAL SCORE: 11
7. FEELING AFRAID AS IF SOMETHING AWFUL MIGHT HAPPEN: MORE THAN HALF THE DAYS
IF YOU CHECKED OFF ANY PROBLEMS ON THIS QUESTIONNAIRE, HOW DIFFICULT HAVE THESE PROBLEMS MADE IT FOR YOU TO DO YOUR WORK, TAKE CARE OF THINGS AT HOME, OR GET ALONG WITH OTHER PEOPLE: VERY DIFFICULT
7. FEELING AFRAID AS IF SOMETHING AWFUL MIGHT HAPPEN: MORE THAN HALF THE DAYS
8. IF YOU CHECKED OFF ANY PROBLEMS, HOW DIFFICULT HAVE THESE MADE IT FOR YOU TO DO YOUR WORK, TAKE CARE OF THINGS AT HOME, OR GET ALONG WITH OTHER PEOPLE?: VERY DIFFICULT
GAD7 TOTAL SCORE: 11
4. TROUBLE RELAXING: MORE THAN HALF THE DAYS
6. BECOMING EASILY ANNOYED OR IRRITABLE: NOT AT ALL
5. BEING SO RESTLESS THAT IT IS HARD TO SIT STILL: SEVERAL DAYS

## 2022-11-02 NOTE — PROGRESS NOTES
[facility]                                     Progress Note    Patient Name: Grant Slaughter  Date: 10/26/2022         Service Type: Individual      Session Start Time:  7:10 am  Session End Time: 8:00 am     Session Length: 50 minutes    Session #: 1st with this provider, 6 with previous provider.      Attendees: Client attended alone    Service Modality:  Video Visit:      Provider verified identity through the following two step process.  Patient provided:  Patient     Telemedicine Visit: The patient's condition can be safely assessed and treated via synchronous audio and visual telemedicine encounter.      Reason for Telemedicine Visit: Services only offered telehealth    Originating Site (Patient Location): Patient's home    Distant Site (Provider Location): Provider Remote Setting- Home Office    Consent:  The patient/guardian has verbally consented to: the potential risks and benefits of telemedicine (video visit) versus in person care; bill my insurance or make self-payment for services provided; and responsibility for payment of non-covered services.     Patient would like the video invitation sent by:  My Chart    Mode of Communication:  Video Conference via Amwell    As the provider I attest to compliance with applicable laws and regulations related to telemedicine.    DATA  Interactive Complexity: No  Crisis: No        Progress Since Last Session (Related to Symptoms / Goals / Homework):   Symptoms:  Reports continued depression and anxiety symptoms.      Homework: Achieved / completed to satisfaction  Patient recently attended treatment at [facility].        Episode of Care Goals: Satisfactory progress - ACTION (Actively working towards change); Intervened by reinforcing change plan / affirming steps taken     Current / Ongoing Stressors and Concerns:   Client reports he is working on setting up structures in his life to help him maintain recovery progress. Has been connecting  with family and started back at work. Patient reports he has maintained sobriety but that it has been challenging.       Treatment Objective(s) Addressed in This Session:   Client will Decrease frequency and intensity of feeling down, depressed, hopeless  Client will identify triggers to depression  Identify negative self-talk and behaviors: challenge core beliefs, myths, and actions.  Client will identify 4-6 effective coping strategies for managing depression symptoms     Intervention:   Solution Focused: reviewed symptoms and recovery/relapse prevention strategies he is working through  Discussed options such as obtaining a sponsor.  Introduced option of EMDR, patient briefly shared some of his past trauma.      Assessments completed prior to visit:  The following assessments were completed by patient for this visit:  PHQ9:   PHQ-9 SCORE 10/16/2019 5/3/2022 6/10/2022 6/13/2022 10/4/2022 10/26/2022   PHQ-9 Total Score MyChart - 5 (Mild depression) 9 (Mild depression) 7 (Mild depression) 9 (Mild depression) 10 (Moderate depression)   PHQ-9 Total Score 6 5 9 7 9 10     GAD7:   SHARON-7 SCORE 5/8/2022 6/10/2022 6/10/2022 6/13/2022 9/1/2022 10/4/2022 10/26/2022   Total Score 4 (minimal anxiety) - 11 (moderate anxiety) - 16 (severe anxiety) 7 (mild anxiety) 11 (moderate anxiety)   Total Score 4 11 11 6 16 7 11         ASSESSMENT: Current Emotional / Mental Status (status of significant symptoms):   Risk status (Self / Other harm or suicidal ideation)   Patient denies current fears or concerns for personal safety.   Patient denies current or recent suicidal ideation or behaviors.   Patient denies current or recent homicidal ideation or behaviors.   Patient denies current or recent self injurious behavior or ideation.   Patient denies other safety concerns.   Patient reports there has been no change in risk factors since their last session.     Patient reports there has been no change in protective factors since their last  session.     Recommended that patient call 911 or go to the local ED should there be a change in any of these risk factors.     Appearance:   Appropriate    Eye Contact:   Good    Psychomotor Behavior: Normal    Attitude:   Cooperative    Orientation:   All   Speech    Rate / Production: Normal     Volume:  Normal    Mood:    Depressed  Irritable    Affect:    Flat    Thought Content:  Clear    Thought Form:  Coherent  Logical    Insight:    Good      Medication Review:   No changes to current psychiatric medication(s)     Medication Compliance:   Yes     Changes in Health Issues:   None reported     Chemical Use Review:   Substance Use: decrease in alcohol .  Patient reports frequency of use Reports last date of use as September 2022. .  Stage of Change: Action and Maintenance     Patient reported recently attending inpatient treatment at [facility] followed by outpatient treatment.       Tobacco Use: No current tobacco use.      Diagnosis:  1. Moderate episode of recurrent major depressive disorder (H)    2. Alcohol dependence in remission (H)        Collateral Reports Completed:   Not Applicable    PLAN: (Patient Tasks / Therapist Tasks / Other)  Patient scheduled for next therapy session in one week.  to continue recovery program.  Patient encouraged to consider obtaining a sponsor.  Writer sent patient information about EMDR and calm / safe place exercise.          Brianne Sultana, LICSW, MSW, LICSW   10/26/2022                                                         ______________________________________________________________________    Individual Treatment Plan    Patient's Name: Grant Slaughter  YOB: 1970    Date of Creation: 9/1/2022  Date Treatment Plan Last Reviewed/Revised:     Diagnosis:  1. Mild episode of recurrent major depressive disorder (H)      Psychosocial / Contextual Factors: work stress  PROMIS (reviewed every 90 days):     Referral / Collaboration:  The following referral(s)  will be initiated: [facility].    Anticipated number of session for this episode of care: 10-15  Anticipation frequency of session: Weekly  Anticipated Duration of each session: 38-52 minutes  Treatment plan will be reviewed in 90 days or when goals have been changed.       MeasurableTreatment Goal(s) related to diagnosis / functional impairment(s)  Goal 1: Client will experience decreased depression symptoms evidenced by PHQ9 score below 5 and report increased ability to manage depression.    Objective #A (Client Action)    Client will Decrease frequency and intensity of feeling down, depressed, hopeless  Client will identify triggers to depression  Identify negative self-talk and behaviors: challenge core beliefs, myths, and actions.  Client will identify 4-6 effective coping strategies for managing depression symptoms    Status: New - Date: 5/26/2022, continue 9/1/2022    Intervention(s)  Therapist will teach CBT and DBT skills.      Patient has reviewed and agreed to the above plan.      Brianne Sultana, LICSW, MSW, LICSW    Answers for HPI/ROS submitted by the patient on 10/26/2022  If you checked off any problems, how difficult have these problems made it for you to do your work, take care of things at home, or get along with other people?: Very difficult  PHQ9 TOTAL SCORE: 10  SHARON 7 TOTAL SCORE: 11

## 2022-11-23 DIAGNOSIS — I10 ESSENTIAL HYPERTENSION, BENIGN: Primary | ICD-10-CM

## 2022-11-24 NOTE — TELEPHONE ENCOUNTER
"Routing refill request to provider for review/approval because:  A break in medication    Last Written Prescription Date:  5/3/22  Last Fill Quantity: 90,  # refills: 0   Last office visit provider:  10/4/22     Requested Prescriptions   Pending Prescriptions Disp Refills     amLODIPine (NORVASC) 5 MG tablet 90 tablet 0     Sig: [AMLODIPINE (NORVASC) 5 MG TABLET] TAKE 1 TABLET BY MOUTH EVERY DAY       Calcium Channel Blockers Protocol  Passed - 11/23/2022  8:26 AM        Passed - Blood pressure under 140/90 in past 12 months     BP Readings from Last 3 Encounters:   10/04/22 123/79   05/03/22 106/73   04/16/21 139/89                 Passed - Recent (12 mo) or future (30 days) visit within the authorizing provider's specialty     Patient has had an office visit with the authorizing provider or a provider within the authorizing providers department within the previous 12 mos or has a future within next 30 days. See \"Patient Info\" tab in inbasket, or \"Choose Columns\" in Meds & Orders section of the refill encounter.              Passed - Medication is active on med list        Passed - Patient is age 18 or older        Passed - Normal serum creatinine on file in past 12 months     Recent Labs   Lab Test 05/03/22  1019   CR 0.82       Ok to refill medication if creatinine is low               ANAYA LAYNE RN 11/24/22 4:10 PM  "

## 2022-11-25 RX ORDER — AMLODIPINE BESYLATE 5 MG/1
TABLET ORAL
Qty: 90 TABLET | Refills: 0 | Status: SHIPPED | OUTPATIENT
Start: 2022-11-25 | End: 2023-02-13

## 2023-02-12 DIAGNOSIS — I10 ESSENTIAL HYPERTENSION, BENIGN: ICD-10-CM

## 2023-02-12 DIAGNOSIS — Z76.0 ENCOUNTER FOR MEDICATION REFILL: ICD-10-CM

## 2023-02-12 DIAGNOSIS — E78.5 HYPERLIPIDEMIA: ICD-10-CM

## 2023-02-13 ENCOUNTER — VIRTUAL VISIT (OUTPATIENT)
Dept: PSYCHOLOGY | Facility: CLINIC | Age: 53
End: 2023-02-13
Payer: COMMERCIAL

## 2023-02-13 DIAGNOSIS — F33.1 MODERATE EPISODE OF RECURRENT MAJOR DEPRESSIVE DISORDER (H): Primary | ICD-10-CM

## 2023-02-13 DIAGNOSIS — Z87.898 HISTORY OF ALCOHOL USE DISORDER: ICD-10-CM

## 2023-02-13 PROCEDURE — 90839 PSYTX CRISIS INITIAL 60 MIN: CPT | Mod: VID | Performed by: SOCIAL WORKER

## 2023-02-13 RX ORDER — ATORVASTATIN CALCIUM 20 MG/1
TABLET, FILM COATED ORAL
Qty: 90 TABLET | Refills: 0 | Status: SHIPPED | OUTPATIENT
Start: 2023-02-13 | End: 2023-05-15

## 2023-02-13 RX ORDER — AMLODIPINE BESYLATE 5 MG/1
TABLET ORAL
Qty: 90 TABLET | Refills: 0 | Status: SHIPPED | OUTPATIENT
Start: 2023-02-13 | End: 2023-05-15

## 2023-02-13 ASSESSMENT — ANXIETY QUESTIONNAIRES
7. FEELING AFRAID AS IF SOMETHING AWFUL MIGHT HAPPEN: SEVERAL DAYS
GAD7 TOTAL SCORE: 10
5. BEING SO RESTLESS THAT IT IS HARD TO SIT STILL: SEVERAL DAYS
8. IF YOU CHECKED OFF ANY PROBLEMS, HOW DIFFICULT HAVE THESE MADE IT FOR YOU TO DO YOUR WORK, TAKE CARE OF THINGS AT HOME, OR GET ALONG WITH OTHER PEOPLE?: SOMEWHAT DIFFICULT
7. FEELING AFRAID AS IF SOMETHING AWFUL MIGHT HAPPEN: SEVERAL DAYS
3. WORRYING TOO MUCH ABOUT DIFFERENT THINGS: MORE THAN HALF THE DAYS
IF YOU CHECKED OFF ANY PROBLEMS ON THIS QUESTIONNAIRE, HOW DIFFICULT HAVE THESE PROBLEMS MADE IT FOR YOU TO DO YOUR WORK, TAKE CARE OF THINGS AT HOME, OR GET ALONG WITH OTHER PEOPLE: SOMEWHAT DIFFICULT
2. NOT BEING ABLE TO STOP OR CONTROL WORRYING: MORE THAN HALF THE DAYS
GAD7 TOTAL SCORE: 10
GAD7 TOTAL SCORE: 10
4. TROUBLE RELAXING: SEVERAL DAYS
1. FEELING NERVOUS, ANXIOUS, OR ON EDGE: SEVERAL DAYS
6. BECOMING EASILY ANNOYED OR IRRITABLE: MORE THAN HALF THE DAYS

## 2023-02-13 NOTE — PROGRESS NOTES
"[facility]                                       Grant Slaughter     SAFETY PLAN:  Step 1: Warning signs / cues (Thoughts, images, mood, situation, behavior) that a crisis may be developing:    Thoughts: \"I don't matter\", \"People would be better off without me\", \"I'm a burden\", \"I can't do this anymore\", \"I just want this to end\" and \"Nothing makes it better\"    Images: obsessive thoughts of death or dying:  , flashbacks and visions of harm:      Thinking Processes: ruminations (can't stop thinking about my problems): ex not being able to see family.  , racing thoughts, intrusive thoughts (bothersome, unwanted thoughts that come out of nowhere):   and highly critical and negative thoughts:      Mood: worsening depression, hopelessness, helplessness, intense anger, intense worry, agitation, disinhibited (not caring about things or consequences) and mood swings    Behaviors: isolating/withdrawing , using drugs, using alcohol, can't stop crying, impulsive, reckless behaviors (acting without thinking):  , giving things away, saying good-bye, aggression, not taking care of myself, not taking care of my responsibilities, sleeping too much, not sleeping enough and increasing frequency and duration of dissociation    Situations: pain, relationship problems, trauma , relapse and financial stress   Step 2: Coping strategies - Things I can do to take my mind off of my problems without contacting another person (relaxation technique, physical activity):    Distress Tolerance Strategies:  relaxation activities:  , listen to positive and upbeat music:  , sensory based activities/self-soothe with five senses: ex lotions, watch a funny movie: watch sports, read a book: read newspapers, change body temperature (ice pack/cold water) , paced breathing/progressive muscle relaxation and intense exercise:   for 2-3 minutes     Physical Activities: go for a walk, exercise:  , meditation, deep breathing and stretching " "    Focus on helpful thoughts:  \"This is temporary\", \"I will get through this\", \"It always passes\", \"Ride the wave\", think about happy memories:  , remind myself of what is important to me:   and self-compassion statements:    Step 3: People and social settings that provide distraction:   Name: Grant Phone:    Name: Edilson Phone:    Name:    Phone:     [employer], library, community center, support group (i.e. twelve-step) and work   Step 4: Remind myself of people and things that are important to me and worth living for:  My family, friends, job,       Step 5: When I am in crisis, I can ask these people to help me use my safety plan:   Name: Grant Phone:    Name: Edilson Phone:      Step 6: Making the environment safe:     remove alcohol, remove drugs, secure medications:  , dispose of old medications , remove access to firearms:  , remove things I could use to hurt myself:   and be around others  Step 7: Professionals or agencies I can contact during a crisis:    Suicide Prevention Lifeline: Call or Text 625   Local Crisis Services: [facility] [phone]    Call 911 or go to my nearest emergency department.   I helped develop this safety plan and agree to use it when needed.  I have been given a copy of this plan.      Client signature ______________N/A virtual session___________________________________________________  Today s date:  2/13/2023  Completed by Provider Name/ Credentials:  Brianne Sultana, [facility]  February 13, 2023  Adapted from Safety Plan Template 2008 Oliva Myers and Donal Valverde is reprinted with the express permission of the authors.  No portion of the Safety Plan Template may be reproduced without the express, written permission.  You can contact the authors at [email or web address] or [email or web address].          Answers for HPI/ROS submitted by the patient on 2/13/2023  If you checked off any problems, how difficult have these problems made it for you to do your work, " take care of things at home, or get along with other people?: Somewhat difficult  PHQ9 TOTAL SCORE: 9  SHARON 7 TOTAL SCORE: 10

## 2023-02-13 NOTE — TELEPHONE ENCOUNTER
"Last Written Prescription Date:  4/14/22-alternate pharmacy requested  Last Fill Quantity: 90,  # refills: 3   Last office visit provider:  10/4/22    Requested Prescriptions   Pending Prescriptions Disp Refills     atorvastatin (LIPITOR) 20 MG tablet [Pharmacy Med Name: ATORVASTATIN 20 MG TABLET] 90 tablet 2     Sig: TAKE 1 TABLET BY MOUTH EVERY DAY       Statins Protocol Passed - 2/12/2023 11:41 AM        Passed - LDL on file in past 12 months     Recent Labs   Lab Test 05/03/22  1019   LDL 69             Passed - No abnormal creatine kinase in past 12 months     No lab results found.             Passed - Recent (12 mo) or future (30 days) visit within the authorizing provider's specialty     Patient has had an office visit with the authorizing provider or a provider within the authorizing providers department within the previous 12 mos or has a future within next 30 days. See \"Patient Info\" tab in inbasket, or \"Choose Columns\" in Meds & Orders section of the refill encounter.              Passed - Medication is active on med list        Passed - Patient is age 18 or older             Chantel Moe RN 02/13/23 4:55 PM  "

## 2023-02-13 NOTE — PROGRESS NOTES
[facility]                                     Progress Note    Patient Name: Grant Slaughter  Date: 2/13/2023         Service Type: Individual      Session Start Time:  3:05 pm  Session End Time: 3:55 pm     Session Length: 50 minutes    Session #: 2nd with this provider, 6 with previous provider.      Attendees: Client attended alone    Service Modality:  Video Visit:      Provider verified identity through the following two step process.  Patient provided:  Patient is known previously to provider    Telemedicine Visit: The patient's condition can be safely assessed and treated via synchronous audio and visual telemedicine encounter.      Reason for Telemedicine Visit: Services only offered telehealth    Originating Site (Patient Location): Patient's home    Distant Site (Provider Location): Provider Remote Setting- Home Office    Consent:  The patient/guardian has verbally consented to: the potential risks and benefits of telemedicine (video visit) versus in person care; bill my insurance or make self-payment for services provided; and responsibility for payment of non-covered services.     Patient would like the video invitation sent by:  My Chart    Mode of Communication:  Video Conference via Amwell    As the provider I attest to compliance with applicable laws and regulations related to telemedicine.    DATA  Interactive Complexity: No  Crisis: No  Yes, visit entailed Crisis Management / Stabilization requiring urgent assessment and history of the crisis state, mental status exam and disposition  Patient reported SI at todays session, patient not seen in multiple months.        Progress Since Last Session (Related to Symptoms / Goals / Homework):   Symptoms:  Reports continued depression and anxiety symptoms, reports recent suicidal ideation.       Homework: Did not complete  First session since fall 2022,   Patient did not attend therapy as recommended at the time he had scheduled a  session but then cancelled it.        Episode of Care Goals: No improvement - PRECONTEMPLATION (Not seeing need for change); Intervened by educating the patient about the effects of current behavior on health.  Evoked information about reasons to continue behavior, express concern / recommendations, and explored any change talk     Current / Ongoing Stressors and Concerns:   Client reports he is working on setting up structures in his life to help him maintain recovery progress. Has been connecting with family and started back at work. Patient reports he has not maintained sobriety since last session, reports last alcohol use approximately two weeks ago.      Treatment Objective(s) Addressed in This Session:   Client will Decrease frequency and intensity of feeling down, depressed, hopeless  Client will identify triggers to depression   Patient will reduce / abstain from alcohol use.     Patient reports some increased family stress, also reports has had difficulty maintaining sobriety.       Intervention:   Solution Focused: reviewed symptoms and recovery/relapse prevention strategies he is working through    Assessed for safety and developed safety plan.      Assessments completed prior to visit:  The following assessments were completed by patient for this visit:  PHQ9:   PHQ-9 SCORE 10/16/2019 5/3/2022 6/10/2022 6/13/2022 10/4/2022 10/26/2022 2/13/2023   PHQ-9 Total Score MyChart - 5 (Mild depression) 9 (Mild depression) 7 (Mild depression) 9 (Mild depression) 10 (Moderate depression) 9 (Mild depression)   PHQ-9 Total Score 6 5 9 7 9 10 9     GAD7:   SHARON-7 SCORE 6/10/2022 6/10/2022 6/13/2022 9/1/2022 10/4/2022 10/26/2022 2/13/2023   Total Score - 11 (moderate anxiety) - 16 (severe anxiety) 7 (mild anxiety) 11 (moderate anxiety) 10 (moderate anxiety)   Total Score 11 11 6 16 7 11 10         ASSESSMENT: Current Emotional / Mental Status (status of significant symptoms):   Risk status (Self / Other harm or suicidal  ideation)   Patient denies current fears or concerns for personal safety.   Patient denies current or recent suicidal ideation or behaviors.   Patient denies current or recent homicidal ideation or behaviors.   Patient denies current or recent self injurious behavior or ideation.   Patient denies other safety concerns.   Patient reports there has been no change in risk factors since their last session.     Patient reports there has been no change in protective factors since their last session.     Recommended that patient call 911 or go to the local ED should there be a change in any of these risk factors.     Appearance:   Appropriate    Eye Contact:   Good    Psychomotor Behavior: Normal    Attitude:   Cooperative    Orientation:   All   Speech    Rate / Production: Normal     Volume:  Normal    Mood:    Depressed  Irritable    Affect:    Flat    Thought Content:  Clear    Thought Form:  Coherent  Logical    Insight:    Good      Medication Review:   No changes to current psychiatric medication(s)     Medication Compliance:   Yes     Changes in Health Issues:   None reported     Chemical Use Review:   Substance Use: decrease in alcohol .  Patient reports frequency of use Reports last date of use as September 2022. .  Stage of Change: Action and Maintenance     Patient reported recently attending inpatient treatment at [facility] followed by outpatient treatment.       Tobacco Use: No current tobacco use.      Diagnosis:  1. Moderate episode of recurrent major depressive disorder (H)    2. History of alcohol use disorder        Collateral Reports Completed:   Not Applicable    PLAN: (Patient Tasks / Therapist Tasks / Other)  Patient recommended to attend therapy regularly, patient reports due to work schedule cannot attend session for several weeks.  Patient to work with providers to address health concerns.  Patient recommended if at all possible to abstain from alcohol use. Patient sent safety plan and recommended  to use as needed.       Brianne Sultana, [facility], MSW, [facility]   10/26/2022                                                         ______________________________________________________________________    Individual Treatment Plan    Patient's Name: Grant Slaughter  YOB: 1970    Date of Creation: 9/1/2022  Date Treatment Plan Last Reviewed/Revised:     Diagnosis:  1. Mild episode of recurrent major depressive disorder (H)      Psychosocial / Contextual Factors: work stress  PROMIS (reviewed every 90 days):     Referral / Collaboration:  Referral to another professional/service is not indicated at this time..    Anticipated number of session for this episode of care: 10-15  Anticipation frequency of session: Weekly  Anticipated Duration of each session: 38-52 minutes  Treatment plan will be reviewed in 90 days or when goals have been changed.       MeasurableTreatment Goal(s) related to diagnosis / functional impairment(s)  Goal 1: Client will experience decreased depression symptoms evidenced by PHQ9 score below 5 and report increased ability to manage depression.    Objective #A (Client Action)    Client will Decrease frequency and intensity of feeling down, depressed, hopeless  Client will identify triggers to depression  Identify negative self-talk and behaviors: challenge core beliefs, myths, and actions.  Client will identify 4-6 effective coping strategies for managing depression symptoms    Status: Continued 2/13/2023.      Intervention(s)  Therapist will teach CBT and DBT skills.    Goal 2: Client will reduce SI and maintain person safety.     I will know I've met my goal when I am experiencing reduced SI.      Objective #A (Client Action)    Client will Decrease thoughts that you'd be better off dead or of suicide / self-harm.  Status: New - Date: 2/13/2023     Intervention(s)  Therapist will develop safety plan with patient. .    Objective #B  Client will Use safety plan as needed.  .    Status:  New - Date: 2/13/2023     Intervention(s)  Therapist will assign homework Patient to use safety plan as needed.  .          Patient has reviewed and agreed to the above plan.      Brianne Sultana, [facility] Center[facility], MSW, [facility]    Answers for HPI/ROS submitted by the patient on 10/26/2022  If you checked off any problems, how difficult have these problems made it for you to do your work, take care of things at home, or get along with other people?: Very difficult  PHQ9 TOTAL SCORE: 10  SHARON 7 TOTAL SCORE: 11    Answers for HPI/ROS submitted by the patient on 2/13/2023  If you checked off any problems, how difficult have these problems made it for you to do your work, take care of things at home, or get along with other people?: Somewhat difficult  PHQ9 TOTAL SCORE: 9  SHARON 7 TOTAL SCORE: 10

## 2023-02-13 NOTE — TELEPHONE ENCOUNTER
"Last Written Prescription Date:  11/25/2022  Last Fill Quantity: 90,  # refills: 0   Last office visit provider:  10/04/2022     Requested Prescriptions   Pending Prescriptions Disp Refills     amLODIPine (NORVASC) 5 MG tablet [Pharmacy Med Name: AMLODIPINE BESYLATE 5 MG TAB] 90 tablet 0     Sig: TAKE 1 TABLET BY MOUTH EVERY DAY       Calcium Channel Blockers Protocol  Passed - 2/12/2023 11:41 AM        Passed - Blood pressure under 140/90 in past 12 months     BP Readings from Last 3 Encounters:   10/04/22 123/79   05/03/22 106/73   04/16/21 139/89                 Passed - Recent (12 mo) or future (30 days) visit within the authorizing provider's specialty     Patient has had an office visit with the authorizing provider or a provider within the authorizing providers department within the previous 12 mos or has a future within next 30 days. See \"Patient Info\" tab in inbasket, or \"Choose Columns\" in Meds & Orders section of the refill encounter.              Passed - Medication is active on med list        Passed - Patient is age 18 or older        Passed - Normal serum creatinine on file in past 12 months     Recent Labs   Lab Test 05/03/22  1019   CR 0.82       Ok to refill medication if creatinine is low               SABRINA HENLEY RN 02/13/23 3:56 PM  "

## 2023-03-02 ENCOUNTER — OFFICE VISIT (OUTPATIENT)
Dept: FAMILY MEDICINE | Facility: CLINIC | Age: 53
End: 2023-03-02
Payer: COMMERCIAL

## 2023-03-02 VITALS
SYSTOLIC BLOOD PRESSURE: 134 MMHG | HEART RATE: 75 BPM | HEIGHT: 71 IN | OXYGEN SATURATION: 97 % | DIASTOLIC BLOOD PRESSURE: 74 MMHG | RESPIRATION RATE: 20 BRPM | WEIGHT: 217.25 LBS | TEMPERATURE: 98.7 F | BODY MASS INDEX: 30.41 KG/M2

## 2023-03-02 DIAGNOSIS — F10.20 MODERATE ALCOHOL USE DISORDER (H): ICD-10-CM

## 2023-03-02 DIAGNOSIS — Z12.11 SCREEN FOR COLON CANCER: ICD-10-CM

## 2023-03-02 DIAGNOSIS — F41.0 PANIC DISORDER WITHOUT AGORAPHOBIA: ICD-10-CM

## 2023-03-02 DIAGNOSIS — J31.0 CHRONIC RHINITIS: ICD-10-CM

## 2023-03-02 DIAGNOSIS — E78.00 PURE HYPERCHOLESTEROLEMIA: ICD-10-CM

## 2023-03-02 DIAGNOSIS — Z00.00 HEALTHCARE MAINTENANCE: ICD-10-CM

## 2023-03-02 DIAGNOSIS — E11.9 TYPE 2 DIABETES MELLITUS WITHOUT COMPLICATION, WITHOUT LONG-TERM CURRENT USE OF INSULIN (H): Primary | ICD-10-CM

## 2023-03-02 DIAGNOSIS — I10 ESSENTIAL HYPERTENSION, BENIGN: ICD-10-CM

## 2023-03-02 LAB
ALT SERPL W P-5'-P-CCNC: 36 U/L (ref 10–50)
ANION GAP SERPL CALCULATED.3IONS-SCNC: 9 MMOL/L (ref 7–15)
BUN SERPL-MCNC: 11.2 MG/DL (ref 6–20)
CALCIUM SERPL-MCNC: 9.6 MG/DL (ref 8.6–10)
CHLORIDE SERPL-SCNC: 98 MMOL/L (ref 98–107)
CREAT SERPL-MCNC: 0.72 MG/DL (ref 0.67–1.17)
DEPRECATED HCO3 PLAS-SCNC: 26 MMOL/L (ref 22–29)
GFR SERPL CREATININE-BSD FRML MDRD: >90 ML/MIN/1.73M2
GLUCOSE SERPL-MCNC: 156 MG/DL (ref 70–99)
HBA1C MFR BLD: 7.5 % (ref 0–5.6)
LDLC SERPL DIRECT ASSAY-MCNC: 70 MG/DL
POTASSIUM SERPL-SCNC: 3.9 MMOL/L (ref 3.4–5.3)
SODIUM SERPL-SCNC: 133 MMOL/L (ref 136–145)

## 2023-03-02 PROCEDURE — 80048 BASIC METABOLIC PNL TOTAL CA: CPT | Performed by: FAMILY MEDICINE

## 2023-03-02 PROCEDURE — 99214 OFFICE O/P EST MOD 30 MIN: CPT | Performed by: FAMILY MEDICINE

## 2023-03-02 PROCEDURE — 36415 COLL VENOUS BLD VENIPUNCTURE: CPT | Performed by: FAMILY MEDICINE

## 2023-03-02 PROCEDURE — 83721 ASSAY OF BLOOD LIPOPROTEIN: CPT | Performed by: FAMILY MEDICINE

## 2023-03-02 PROCEDURE — 83036 HEMOGLOBIN GLYCOSYLATED A1C: CPT | Performed by: FAMILY MEDICINE

## 2023-03-02 PROCEDURE — 84460 ALANINE AMINO (ALT) (SGPT): CPT | Performed by: FAMILY MEDICINE

## 2023-03-02 RX ORDER — FLUTICASONE PROPIONATE 50 MCG
1 SPRAY, SUSPENSION (ML) NASAL DAILY
Qty: 16 G | Refills: 4 | Status: SHIPPED | OUTPATIENT
Start: 2023-03-02 | End: 2024-04-30

## 2023-03-02 RX ORDER — CITALOPRAM HYDROBROMIDE 40 MG/1
40 TABLET ORAL DAILY
Qty: 90 TABLET | Refills: 3 | Status: SHIPPED | OUTPATIENT
Start: 2023-03-02 | End: 2024-02-20

## 2023-03-02 ASSESSMENT — ANXIETY QUESTIONNAIRES
GAD7 TOTAL SCORE: 8
8. IF YOU CHECKED OFF ANY PROBLEMS, HOW DIFFICULT HAVE THESE MADE IT FOR YOU TO DO YOUR WORK, TAKE CARE OF THINGS AT HOME, OR GET ALONG WITH OTHER PEOPLE?: SOMEWHAT DIFFICULT
GAD7 TOTAL SCORE: 8
IF YOU CHECKED OFF ANY PROBLEMS ON THIS QUESTIONNAIRE, HOW DIFFICULT HAVE THESE PROBLEMS MADE IT FOR YOU TO DO YOUR WORK, TAKE CARE OF THINGS AT HOME, OR GET ALONG WITH OTHER PEOPLE: SOMEWHAT DIFFICULT
3. WORRYING TOO MUCH ABOUT DIFFERENT THINGS: SEVERAL DAYS
2. NOT BEING ABLE TO STOP OR CONTROL WORRYING: SEVERAL DAYS
1. FEELING NERVOUS, ANXIOUS, OR ON EDGE: SEVERAL DAYS
GAD7 TOTAL SCORE: 8
7. FEELING AFRAID AS IF SOMETHING AWFUL MIGHT HAPPEN: SEVERAL DAYS
6. BECOMING EASILY ANNOYED OR IRRITABLE: SEVERAL DAYS
7. FEELING AFRAID AS IF SOMETHING AWFUL MIGHT HAPPEN: SEVERAL DAYS
4. TROUBLE RELAXING: MORE THAN HALF THE DAYS
5. BEING SO RESTLESS THAT IT IS HARD TO SIT STILL: SEVERAL DAYS

## 2023-03-02 ASSESSMENT — PATIENT HEALTH QUESTIONNAIRE - PHQ9
10. IF YOU CHECKED OFF ANY PROBLEMS, HOW DIFFICULT HAVE THESE PROBLEMS MADE IT FOR YOU TO DO YOUR WORK, TAKE CARE OF THINGS AT HOME, OR GET ALONG WITH OTHER PEOPLE: SOMEWHAT DIFFICULT
SUM OF ALL RESPONSES TO PHQ QUESTIONS 1-9: 5
SUM OF ALL RESPONSES TO PHQ QUESTIONS 1-9: 5

## 2023-03-02 NOTE — ASSESSMENT & PLAN NOTE
A1c 7.5.  Patient less active than baseline, discussed that I was on the fence about adding medication.  Currently on metformin at thousand twice daily.  Will wait, follow-up 2 to 3 months, recheck.  Also taking atorvastatin 20 and losartan 100.

## 2023-03-02 NOTE — PROGRESS NOTES
Assessment/ Plan  Type 2 diabetes mellitus without complication, without long-term current use of insulin (H)  A1c 7.5.  Patient less active than baseline, discussed that I was on the fence about adding medication.  Currently on metformin at [facility] twice daily.  Will wait, follow-up 2 to 3 months, recheck.  Also taking atorvastatin 20 and losartan 100.    Pure hypercholesterolemia  Atorvastatin 20, check lipid and ALT    Panic disorder without agoraphobia  Some worsening symptoms recently, increase citalopram to 40 mg, check EKG when he follows up, continue to work with therapist.    Moderate alcohol use disorder (H)  Some relapses lately, has an informal sponsor that he has at work.  On naltrexone, continue therapist, continue sponsor.    Essential hypertension, benign  Blood pressure borderline, for now no change in medication, follow-up in 2-month    Chronic rhinitis  Renewal of fluticasone.     Subjective  CC:  chief complaint  HPI:  See discussion above.  PFSH:  Patient Active Problem List   Diagnosis     Obesity     Essential hypertension, benign     Eczema     Acanthosis Nigricans     Generalized anxiety disorder     Mild episode of recurrent major depressive disorder (H)     Bereavement, uncomplicated     Type 2 diabetes mellitus without complication, without long-term current use of insulin (H)     Pure hypercholesterolemia     Healthcare maintenance     Chronic rhinitis     Acute sinusitis with symptoms > 10 days     Cough due to ACE inhibitor     Dyslipidemia     Eczema     Hypertension     Moderate alcohol use disorder (H)     Panic disorder without agoraphobia     amLODIPine (NORVASC) 5 MG tablet, TAKE 1 TABLET BY MOUTH EVERY DAY  amoxicillin-clavulanate (AUGMENTIN) 875-125 MG tablet, Take 1 tablet by mouth 2 times daily  atorvastatin (LIPITOR) 20 MG tablet, TAKE 1 TABLET BY MOUTH EVERY DAY  citalopram (CELEXA) 20 MG tablet, [CITALOPRAM (CELEXA) 20 MG TABLET] TAKE 1 TABLET BY MOUTH EVERY  "DAY  fluticasone propionate (FLONASE) 50 mcg/actuation nasal spray, [FLUTICASONE PROPIONATE (FLONASE) 50 MCG/ACTUATION NASAL SPRAY] SPRAY 2 SPRAYS INTO EACH NOSTRIL EVERY DAY  hydroCHLOROthiazide (HYDRODIURIL) 25 MG tablet, [HYDROCHLOROTHIAZIDE (HYDRODIURIL) 25 MG TABLET] Take 1 tablet (25 mg total) by mouth daily.  losartan (COZAAR) 100 MG tablet, Take 1 tablet (100 mg) by mouth daily  metFORMIN (GLUCOPHAGE-XR) 500 MG 24 hr tablet, Take 2 tablets (1,000 mg) by mouth 2 times daily (with meals)  naltrexone (DEPADE/REVIA) 50 MG tablet, Take 1 tablet (50 mg) by mouth daily  sertraline (ZOLOFT) 50 MG tablet, Take 1 tablet (50 mg) by mouth daily    No current facility-administered medications on file prior to visit.       History   Smoking Status     Never   Smokeless Tobacco     Never     Social History     Social History Narrative     Not on file     Patient Care Team:  Clifford Evans MD as PCP - General (Family Medicine)  Clifford Evans MD as Assigned PCP        Objective  Physical Exam  Vitals:    03/02/23 1557 03/02/23 1609   BP: (!) 145/82 134/74   BP Location: Left arm    Patient Position: Sitting    Cuff Size: Adult Regular    Pulse: 75    Resp: 20    Temp: 98.7  F (37.1  C)    TempSrc: Oral    SpO2: 97%    Weight: 98.5 kg (217 lb 4 oz)    Height: 1.815 m (5' 11.46\")      Body mass index is 29.91 kg/m .  Gen- alert, oriented/ appropriately responsive  HEENT- normal cephalic, atraumatic.   Chest- Normal inspiration and expiration.    Clear to ascultation.    No chest wall deformity or scar.  CV- Heart regular rate and rhythm  normal tones, no murmurs   No gallops or rubs.  Ext- appear well perfused, no edema  Skin- warm and dry,   no visualized rash    Diagnostics:   Results for orders placed or performed in visit on 03/02/23   ALT     Status: Normal   Result Value Ref Range    ALT 36 10 - 50 U/L   Basic metabolic panel     Status: Abnormal   Result Value Ref Range    Sodium 133 (L) 136 - 145 mmol/L    Potassium " 3.9 3.4 - 5.3 mmol/L    Chloride 98 98 - 107 mmol/L    Carbon Dioxide (CO2) 26 22 - 29 mmol/L    Anion Gap 9 7 - 15 mmol/L    Urea Nitrogen 11.2 6.0 - 20.0 mg/dL    Creatinine 0.72 0.67 - 1.17 mg/dL    Calcium 9.6 8.6 - 10.0 mg/dL    Glucose 156 (H) 70 - 99 mg/dL    GFR Estimate >90 >60 mL/min/1.73m2   LDL cholesterol direct     Status: Normal   Result Value Ref Range    LDL Cholesterol Direct 70 <100 mg/dL   Hemoglobin A1c     Status: Abnormal   Result Value Ref Range    Hemoglobin A1C 7.5 (H) 0.0 - 5.6 %           Please note: Voice recognition software was used in this dictation.  It may therefore contain typographical errors.        Answers for HPI/ROS submitted by the patient on 3/2/2023  If you checked off any problems, how difficult have these problems made it for you to do your work, take care of things at home, or get along with other people?: Somewhat difficult  PHQ9 TOTAL SCORE: 5  SHARON 7 TOTAL SCORE: 8  Do you check your blood pressure regularly outside of the clinic?: Yes  Are your blood pressures ever more than 140 on the top number (systolic) OR more than 90 on the bottom number (diastolic)? (For example, greater than 140/90): No  Are you following a low salt diet?: Yes  Frequency of checking blood sugars:: not at all  Diabetic concerns:: none  Paraesthesia present:: none of these symptoms  Depression/Anxiety: Depression & Anxiety  Status since last visit:: medium  Anxiety since last: : medium  Other associated symptoms of depression:: Yes  Other associated symotome: : Yes  Significant life event: : No  Anxious:: Yes  Current substance use:: Yes  How many servings of fruits and vegetables do you eat daily?: 2-3  On average, how many sweetened beverages do you drink each day (Examples: soda, juice, sweet tea, etc.  Do NOT count diet or artificially sweetened beverages)?: 1  How many minutes a day do you exercise enough to make your heart beat faster?: 20 to 29  How many days a week do you exercise  enough to make your heart beat faster?: 3 or less  How many days per week do you miss taking your medication?: 0

## 2023-03-02 NOTE — ASSESSMENT & PLAN NOTE
Some relapses lately, has an informal sponsor that he has at work.  On naltrexone, continue therapist, continue sponsor.

## 2023-03-02 NOTE — ASSESSMENT & PLAN NOTE
Some worsening symptoms recently, increase citalopram to 40 mg, check EKG when he follows up, continue to work with therapist.

## 2023-03-13 ENCOUNTER — VIRTUAL VISIT (OUTPATIENT)
Dept: PSYCHOLOGY | Facility: CLINIC | Age: 53
End: 2023-03-13
Payer: COMMERCIAL

## 2023-03-13 DIAGNOSIS — Z87.898 HISTORY OF ALCOHOL USE DISORDER: ICD-10-CM

## 2023-03-13 DIAGNOSIS — F33.1 MODERATE EPISODE OF RECURRENT MAJOR DEPRESSIVE DISORDER (H): Primary | ICD-10-CM

## 2023-03-13 PROCEDURE — 90834 PSYTX W PT 45 MINUTES: CPT | Mod: VID | Performed by: SOCIAL WORKER

## 2023-03-13 ASSESSMENT — PATIENT HEALTH QUESTIONNAIRE - PHQ9
SUM OF ALL RESPONSES TO PHQ QUESTIONS 1-9: 2
SUM OF ALL RESPONSES TO PHQ QUESTIONS 1-9: 2
10. IF YOU CHECKED OFF ANY PROBLEMS, HOW DIFFICULT HAVE THESE PROBLEMS MADE IT FOR YOU TO DO YOUR WORK, TAKE CARE OF THINGS AT HOME, OR GET ALONG WITH OTHER PEOPLE: SOMEWHAT DIFFICULT

## 2023-03-13 NOTE — PROGRESS NOTES
[facility]                                     Progress Note    Patient Name: Grant Slaughter  Date: 3/13/2023         Service Type: Individual      Session Start Time:  3:05 pm  Session End Time: 3:55 pm     Session Length: 50 minutes    Session #: 3rd with this provider, 6 with previous provider.      Attendees: Client attended alone    Service Modality:  Video Visit:      Provider verified identity through the following two step process.  Patient provided:  Patient is known previously to provider    Telemedicine Visit: The patient's condition can be safely assessed and treated via synchronous audio and visual telemedicine encounter.      Reason for Telemedicine Visit: Services only offered telehealth    Originating Site (Patient Location): Patient's home    Distant Site (Provider Location): Provider Remote Setting- Home Office    Consent:  The patient/guardian has verbally consented to: the potential risks and benefits of telemedicine (video visit) versus in person care; bill my insurance or make self-payment for services provided; and responsibility for payment of non-covered services.     Patient would like the video invitation sent by:  My Chart    Mode of Communication:  Video Conference via Amwell    As the provider I attest to compliance with applicable laws and regulations related to telemedicine.    DATA  Interactive Complexity: No  Crisis: No  Yes, visit entailed Crisis Management / Stabilization requiring urgent assessment and history of the crisis state, mental status exam and disposition  Patient reported SI at todays session, patient not seen in multiple months.        Progress Since Last Session (Related to Symptoms / Goals / Homework):   Symptoms:  Reports continued depression and anxiety symptoms, reports reduced symptoms from last session.      Homework: Achieved / completed to satisfaction  Patient reported focusing on his mental health and planning enjoyable activities.        Episode of Care Goals: Minimal progress - PREPARATION (Decided to change - considering how); Intervened by negotiating a change plan and determining options / strategies for behavior change, identifying triggers, exploring social supports, and working towards setting a date to begin behavior change     Current / Ongoing Stressors and Concerns:   Client reports he is working on setting up structures in his life to help him maintain recovery progress. Has been connecting with family and started back at work. Patient reports he has not maintained sobriety since last session, reports continued occasional alcohol use.        Treatment Objective(s) Addressed in This Session:   Client will Decrease frequency and intensity of feeling down, depressed, hopeless  Client will identify triggers to depression   Patient will reduce / abstain from alcohol use.     Patient reports difficulty maintaining sobriety but is working to drink minimally.       Intervention:   CBT: Identified negative thought patters and helped patient focus on more positive thoughts.    Solution Focused: Problem solving around current stressors.      Assessed for safety.    Assessments completed prior to visit:  The following assessments were completed by patient for this visit:  PHQ9:   PHQ-9 SCORE 6/10/2022 6/13/2022 10/4/2022 10/26/2022 2/13/2023 3/2/2023 3/13/2023   PHQ-9 Total Score MyChart 9 (Mild depression) 7 (Mild depression) 9 (Mild depression) 10 (Moderate depression) 9 (Mild depression) 5 (Mild depression) 2 (Minimal depression)   PHQ-9 Total Score 9 7 9 10 9 5 2     GAD7:   SHARON-7 SCORE 6/10/2022 6/13/2022 9/1/2022 10/4/2022 10/26/2022 2/13/2023 3/2/2023   Total Score 11 (moderate anxiety) - 16 (severe anxiety) 7 (mild anxiety) 11 (moderate anxiety) 10 (moderate anxiety) 8 (mild anxiety)   Total Score 11 6 16 7 11 10 8         ASSESSMENT: Current Emotional / Mental Status (status of significant symptoms):   Risk status (Self / Other harm  or suicidal ideation)   Patient denies current fears or concerns for personal safety.   Patient denies current or recent suicidal ideation or behaviors.   Patient denies current or recent homicidal ideation or behaviors.   Patient denies current or recent self injurious behavior or ideation.   Patient denies other safety concerns.   Patient reports there has been no change in risk factors since their last session.     Patient reports there has been no change in protective factors since their last session.     Recommended that patient call 911 or go to the local ED should there be a change in any of these risk factors.     Appearance:   Appropriate    Eye Contact:   Good    Psychomotor Behavior: Normal    Attitude:   Cooperative    Orientation:   All   Speech    Rate / Production: Normal     Volume:  Normal    Mood:    Anxious  Depressed  Irritable    Affect:    Flat    Thought Content:  Clear    Thought Form:  Coherent  Logical    Insight:    Good      Medication Review:   No changes to current psychiatric medication(s)     Medication Compliance:   Yes     Changes in Health Issues:   None reported     Chemical Use Review:   Substance Use: decrease in alcohol .  Patient reports frequency of use Reports last date of use as September 2022. .  Stage of Change: Action and Maintenance     Patient reported recently attending inpatient treatment at [facility] followed by outpatient treatment.       Tobacco Use: No current tobacco use.      Diagnosis:  1. Moderate episode of recurrent major depressive disorder (H)    2. History of alcohol use disorder        Collateral Reports Completed:   Not Applicable    PLAN: (Patient Tasks / Therapist Tasks / Other)  Patient recommended to continue therapy, two upcoming sessions scheduled.  Patient to work with providers to address health concerns.  Patient recommended if at all possible to abstain from alcohol use. Patient has safety plan from previous session and recommended to use as  needed.       Brianne Sultana, [facility], MSW, [facility]   10/26/2022                                                         ______________________________________________________________________    Individual Treatment Plan    Patient's Name: Grant Slaughter  YOB: 1970    Date of Creation: 9/1/2022  Date Treatment Plan Last Reviewed/Revised: 2/13/2023    Diagnosis:  1. Mild episode of recurrent major depressive disorder (H)      Psychosocial / Contextual Factors: work stress  PROMIS (reviewed every 90 days):     Referral / Collaboration:  Referral to another professional/service is not indicated at this time..    Anticipated number of session for this episode of care: 10-15  Anticipation frequency of session: Weekly  Anticipated Duration of each session: 38-52 minutes  Treatment plan will be reviewed in 90 days or when goals have been changed.       MeasurableTreatment Goal(s) related to diagnosis / functional impairment(s)  Goal 1: Client will experience decreased depression symptoms evidenced by PHQ9 score below 5 and report increased ability to manage depression.    Objective #A (Client Action)    Client will Decrease frequency and intensity of feeling down, depressed, hopeless  Client will identify triggers to depression  Identify negative self-talk and behaviors: challenge core beliefs, myths, and actions.  Client will identify 4-6 effective coping strategies for managing depression symptoms    Status: Continued 2/13/2023.      Intervention(s)  Therapist will teach CBT and DBT skills.    Goal 2: Client will reduce SI and maintain person safety.     I will know I've met my goal when I am experiencing reduced SI.      Objective #A (Client Action)    Client will Decrease thoughts that you'd be better off dead or of suicide / self-harm.  Status: New - Date: 2/13/2023     Intervention(s)  Therapist will develop safety plan with patient. .    Objective #B  Client will Use safety plan as needed.  .    Status:  "New - Date: 2/13/2023     Intervention(s)  Therapist will assign homework Patient to use safety plan as needed.  .          Patient has reviewed and agreed to the above plan.         [facility]                                       Grant Slaughter     SAFETY PLAN:  Step 1: Warning signs / cues (Thoughts, images, mood, situation, behavior) that a crisis may be developing:    Thoughts: \"I don't matter\", \"People would be better off without me\", \"I'm a burden\", \"I can't do this anymore\", \"I just want this to end\" and \"Nothing makes it better\"    Images: obsessive thoughts of death or dying:  , flashbacks and visions of harm:      Thinking Processes: ruminations (can't stop thinking about my problems): ex not being able to see family.  , racing thoughts, intrusive thoughts (bothersome, unwanted thoughts that come out of nowhere):   and highly critical and negative thoughts:      Mood: worsening depression, hopelessness, helplessness, intense anger, intense worry, agitation, disinhibited (not caring about things or consequences) and mood swings    Behaviors: isolating/withdrawing , using drugs, using alcohol, can't stop crying, impulsive, reckless behaviors (acting without thinking):  , giving things away, saying good-bye, aggression, not taking care of myself, not taking care of my responsibilities, sleeping too much, not sleeping enough and increasing frequency and duration of dissociation    Situations: pain, relationship problems, trauma , relapse and financial stress   Step 2: Coping strategies - Things I can do to take my mind off of my problems without contacting another person (relaxation technique, physical activity):    Distress Tolerance Strategies:  relaxation activities:  , listen to positive and upbeat music:  , sensory based activities/self-soothe with five senses: ex lotions, watch a funny movie: watch sports, read a book: read newspapers, change body temperature (ice pack/cold water) , " "paced breathing/progressive muscle relaxation and intense exercise:   for 2-3 minutes     Physical Activities: go for a walk, exercise:  , meditation, deep breathing and stretching     Focus on helpful thoughts:  \"This is temporary\", \"I will get through this\", \"It always passes\", \"Ride the wave\", think about happy memories:  , remind myself of what is important to me:   and self-compassion statements:    Step 3: People and social settings that provide distraction:   Name: Grant Phone:    Name: Edilson Phone:    Name:    Phone:     [employer], library, community center, support group (i.e. twelve-step) and work   Step 4: Remind myself of people and things that are important to me and worth living for:  My family, friends, job,       Step 5: When I am in crisis, I can ask these people to help me use my safety plan:   Name: rGant Phone:    Name: Edilson Phone:      Step 6: Making the environment safe:     remove alcohol, remove drugs, secure medications:  , dispose of old medications , remove access to firearms:  , remove things I could use to hurt myself:   and be around others  Step 7: Professionals or agencies I can contact during a crisis:    Suicide Prevention Lifeline: Call or Text 234   Local Crisis Services: [facility] [phone]    Call 911 or go to my nearest emergency department.   I helped develop this safety plan and agree to use it when needed.  I have been given a copy of this plan.      Client signature ______________N/A virtual session___________________________________________________  Today s date:  2/13/2023  Completed by Provider Name/ Credentials:  Brianne Sultana [facility]  February 13, 2023  Adapted from Safety Plan Template 2008 Oliva Valverde is reprinted with the express permission of the authors.  No portion of the Safety Plan Template may be reproduced without the express, written permission.  You can contact the authors at [email or web address] or " [email or web address].          Answers for HPI/ROS submitted by the patient on 2/13/2023  If you checked off any problems, how difficult have these problems made it for you to do your work, take care of things at home, or get along with other people?: Somewhat difficult  PHQ9 TOTAL SCORE: 9  SHARON 7 TOTAL SCORE: 10        Brianne Sultana, LICSW, MSW, LICSW    Answers for HPI/ROS submitted by the patient on 10/26/2022  If you checked off any problems, how difficult have these problems made it for you to do your work, take care of things at home, or get along with other people?: Very difficult  PHQ9 TOTAL SCORE: 10  SHARON 7 TOTAL SCORE: 11    Answers for HPI/ROS submitted by the patient on 2/13/2023  If you checked off any problems, how difficult have these problems made it for you to do your work, take care of things at home, or get along with other people?: Somewhat difficult  PHQ9 TOTAL SCORE: 9  SHARON 7 TOTAL SCORE: 10    Answers for HPI/ROS submitted by the patient on 3/13/2023  If you checked off any problems, how difficult have these problems made it for you to do your work, take care of things at home, or get along with other people?: Somewhat difficult  PHQ9 TOTAL SCORE: 2

## 2023-03-28 ASSESSMENT — ANXIETY QUESTIONNAIRES
GAD7 TOTAL SCORE: 2
5. BEING SO RESTLESS THAT IT IS HARD TO SIT STILL: NOT AT ALL
8. IF YOU CHECKED OFF ANY PROBLEMS, HOW DIFFICULT HAVE THESE MADE IT FOR YOU TO DO YOUR WORK, TAKE CARE OF THINGS AT HOME, OR GET ALONG WITH OTHER PEOPLE?: SOMEWHAT DIFFICULT
4. TROUBLE RELAXING: SEVERAL DAYS
6. BECOMING EASILY ANNOYED OR IRRITABLE: NOT AT ALL
3. WORRYING TOO MUCH ABOUT DIFFERENT THINGS: NOT AT ALL
IF YOU CHECKED OFF ANY PROBLEMS ON THIS QUESTIONNAIRE, HOW DIFFICULT HAVE THESE PROBLEMS MADE IT FOR YOU TO DO YOUR WORK, TAKE CARE OF THINGS AT HOME, OR GET ALONG WITH OTHER PEOPLE: SOMEWHAT DIFFICULT
1. FEELING NERVOUS, ANXIOUS, OR ON EDGE: SEVERAL DAYS
2. NOT BEING ABLE TO STOP OR CONTROL WORRYING: NOT AT ALL
7. FEELING AFRAID AS IF SOMETHING AWFUL MIGHT HAPPEN: NOT AT ALL
GAD7 TOTAL SCORE: 2
7. FEELING AFRAID AS IF SOMETHING AWFUL MIGHT HAPPEN: NOT AT ALL
GAD7 TOTAL SCORE: 2

## 2023-03-28 ASSESSMENT — PATIENT HEALTH QUESTIONNAIRE - PHQ9
10. IF YOU CHECKED OFF ANY PROBLEMS, HOW DIFFICULT HAVE THESE PROBLEMS MADE IT FOR YOU TO DO YOUR WORK, TAKE CARE OF THINGS AT HOME, OR GET ALONG WITH OTHER PEOPLE: SOMEWHAT DIFFICULT
SUM OF ALL RESPONSES TO PHQ QUESTIONS 1-9: 4
SUM OF ALL RESPONSES TO PHQ QUESTIONS 1-9: 4

## 2023-03-29 ENCOUNTER — VIRTUAL VISIT (OUTPATIENT)
Dept: PSYCHOLOGY | Facility: CLINIC | Age: 53
End: 2023-03-29
Payer: COMMERCIAL

## 2023-03-29 DIAGNOSIS — Z87.898 HISTORY OF ALCOHOL USE DISORDER: ICD-10-CM

## 2023-03-29 DIAGNOSIS — F33.1 MODERATE EPISODE OF RECURRENT MAJOR DEPRESSIVE DISORDER (H): Primary | ICD-10-CM

## 2023-03-29 PROCEDURE — 90834 PSYTX W PT 45 MINUTES: CPT | Mod: VID | Performed by: SOCIAL WORKER

## 2023-03-29 NOTE — PROGRESS NOTES
[facility]                                     Progress Note    Patient Name: Grant Slaughter  Date: 3/29/2023         Service Type: Individual      Session Start Time:  3:35 pm  Session End Time: 4:25 pm     Session Length: 50 minutes    Session #: 4th with this provider, 6 with previous provider.      Attendees: Client attended alone    Service Modality:  Video Visit:      Provider verified identity through the following two step process.  Patient provided:  Patient is known previously to provider    Telemedicine Visit: The patient's condition can be safely assessed and treated via synchronous audio and visual telemedicine encounter.      Reason for Telemedicine Visit: Services only offered telehealth    Originating Site (Patient Location): Patient's home    Distant Site (Provider Location): Provider Remote Setting- Home Office    Consent:  The patient/guardian has verbally consented to: the potential risks and benefits of telemedicine (video visit) versus in person care; bill my insurance or make self-payment for services provided; and responsibility for payment of non-covered services.     Patient would like the video invitation sent by:  My Chart    Mode of Communication:  Video Conference via Amwell    As the provider I attest to compliance with applicable laws and regulations related to telemedicine.    DATA  Interactive Complexity: No  Crisis: No  Yes, visit entailed Crisis Management / Stabilization requiring urgent assessment and history of the crisis state, mental status exam and disposition  Patient reported SI at todays session, patient not seen in multiple months.        Progress Since Last Session (Related to Symptoms / Goals / Homework):   Symptoms:  Reports continued depression and anxiety symptoms, reports reduced symptoms from last session.      Homework: Achieved / completed to satisfaction  Patient reported focusing on his mental health and planning enjoyable activities.        Episode of Care Goals: Minimal progress - PREPARATION (Decided to change - considering how); Intervened by negotiating a change plan and determining options / strategies for behavior change, identifying triggers, exploring social supports, and working towards setting a date to begin behavior change     Current / Ongoing Stressors and Concerns:   Client reports he is working on setting up structures in his life to help him maintain recovery progress. Has been connecting with family and started back at work. Patient reports he has not maintained sobriety since last session, reports continued occasional alcohol use.        Treatment Objective(s) Addressed in This Session:   Client will Decrease frequency and intensity of feeling down, depressed, hopeless  Client will identify triggers to depression   Patient will reduce / abstain from alcohol use.     Patient reports difficulty maintaining sobriety but is working to drink minimally.       Intervention:   CBT: Identified negative thought patters and helped patient focus on more positive thoughts.    Solution Focused: Problem solving around current stressors.       MI around reducing alcohol use.      Assessed for safety.    Assessments completed prior to visit:  The following assessments were completed by patient for this visit:  PHQ9:   PHQ-9 SCORE 6/13/2022 10/4/2022 10/26/2022 2/13/2023 3/2/2023 3/13/2023 3/28/2023   PHQ-9 Total Score MyChart 7 (Mild depression) 9 (Mild depression) 10 (Moderate depression) 9 (Mild depression) 5 (Mild depression) 2 (Minimal depression) 4 (Minimal depression)   PHQ-9 Total Score 7 9 10 9 5 2 4     GAD7:   SHARON-7 SCORE 6/13/2022 9/1/2022 10/4/2022 10/26/2022 2/13/2023 3/2/2023 3/28/2023   Total Score - 16 (severe anxiety) 7 (mild anxiety) 11 (moderate anxiety) 10 (moderate anxiety) 8 (mild anxiety) 2 (minimal anxiety)   Total Score 6 16 7 11 10 8 2         ASSESSMENT: Current Emotional / Mental Status (status of significant  symptoms):   Risk status (Self / Other harm or suicidal ideation)   Patient denies current fears or concerns for personal safety.   Patient denies current or recent suicidal ideation or behaviors.   Patient denies current or recent homicidal ideation or behaviors.   Patient denies current or recent self injurious behavior or ideation.   Patient denies other safety concerns.   Patient reports there has been no change in risk factors since their last session.     Patient reports there has been no change in protective factors since their last session.     Recommended that patient call 911 or go to the local ED should there be a change in any of these risk factors.     Appearance:   Appropriate    Eye Contact:   Good    Psychomotor Behavior: Normal    Attitude:   Cooperative    Orientation:   All   Speech    Rate / Production: Normal     Volume:  Normal    Mood:    Anxious  Depressed  Irritable    Affect:    Flat    Thought Content:  Clear    Thought Form:  Coherent  Logical    Insight:    Good      Medication Review:   No changes to current psychiatric medication(s)     Medication Compliance:   Yes     Changes in Health Issues:   None reported     Chemical Use Review:   Substance Use: increase in alcohol .  Patient reports frequency of use varies.  Stage of Change: Contemplation     Patient reported recently attending inpatient treatment at [facility] followed by outpatient treatment.  Patient reported 3/29/2023 that he recently drank when was upset about family.       Tobacco Use: No current tobacco use.      Diagnosis:  1. Moderate episode of recurrent major depressive disorder (H)    2. History of alcohol use disorder        Collateral Reports Completed:   Not Applicable    PLAN: (Patient Tasks / Therapist Tasks / Other)  Patient recommended to continue therapy.  Patient to work with providers to address health concerns.  Patient recommended if at all possible to abstain from alcohol use.  Patient plans to plan social  activities with others.  Patient has safety plan from previous session and recommended to use as needed.       Brianne Sultana, [facility], MSW, [facility]   10/26/2022                                                         ______________________________________________________________________    Individual Treatment Plan    Patient's Name: Grant Slaughter  YOB: 1970    Date of Creation: 9/1/2022  Date Treatment Plan Last Reviewed/Revised: 2/13/2023    Diagnosis:  1. Mild episode of recurrent major depressive disorder (H)      Psychosocial / Contextual Factors: work stress  PROMIS (reviewed every 90 days):     Referral / Collaboration:  Referral to another professional/service is not indicated at this time..    Anticipated number of session for this episode of care: 10-15  Anticipation frequency of session: Weekly  Anticipated Duration of each session: 38-52 minutes  Treatment plan will be reviewed in 90 days or when goals have been changed.       MeasurableTreatment Goal(s) related to diagnosis / functional impairment(s)  Goal 1: Client will experience decreased depression symptoms evidenced by PHQ9 score below 5 and report increased ability to manage depression.    Objective #A (Client Action)    Client will Decrease frequency and intensity of feeling down, depressed, hopeless  Client will identify triggers to depression  Identify negative self-talk and behaviors: challenge core beliefs, myths, and actions.  Client will identify 4-6 effective coping strategies for managing depression symptoms    Status: Continued 2/13/2023.      Intervention(s)  Therapist will teach CBT and DBT skills.    Goal 2: Client will reduce SI and maintain person safety.     I will know I've met my goal when I am experiencing reduced SI.      Objective #A (Client Action)    Client will Decrease thoughts that you'd be better off dead or of suicide / self-harm.  Status: New - Date: 2/13/2023     Intervention(s)  Therapist will develop  "safety plan with patient. .    Objective #B  Client will Use safety plan as needed.  .    Status: New - Date: 2/13/2023     Intervention(s)  Therapist will assign homework Patient to use safety plan as needed.  .          Patient has reviewed and agreed to the above plan.         [facility]                                       Grant Slaughter     SAFETY PLAN:  Step 1: Warning signs / cues (Thoughts, images, mood, situation, behavior) that a crisis may be developing:    Thoughts: \"I don't matter\", \"People would be better off without me\", \"I'm a burden\", \"I can't do this anymore\", \"I just want this to end\" and \"Nothing makes it better\"    Images: obsessive thoughts of death or dying:  , flashbacks and visions of harm:      Thinking Processes: ruminations (can't stop thinking about my problems): ex not being able to see family.  , racing thoughts, intrusive thoughts (bothersome, unwanted thoughts that come out of nowhere):   and highly critical and negative thoughts:      Mood: worsening depression, hopelessness, helplessness, intense anger, intense worry, agitation, disinhibited (not caring about things or consequences) and mood swings    Behaviors: isolating/withdrawing , using drugs, using alcohol, can't stop crying, impulsive, reckless behaviors (acting without thinking):  , giving things away, saying good-bye, aggression, not taking care of myself, not taking care of my responsibilities, sleeping too much, not sleeping enough and increasing frequency and duration of dissociation    Situations: pain, relationship problems, trauma , relapse and financial stress   Step 2: Coping strategies - Things I can do to take my mind off of my problems without contacting another person (relaxation technique, physical activity):    Distress Tolerance Strategies:  relaxation activities:  , listen to positive and upbeat music:  , sensory based activities/self-soothe with five senses: ex lotions, watch a funny " "movie: watch sports, read a book: read newspapers, change body temperature (ice pack/cold water) , paced breathing/progressive muscle relaxation and intense exercise:   for 2-3 minutes     Physical Activities: go for a walk, exercise:  , meditation, deep breathing and stretching     Focus on helpful thoughts:  \"This is temporary\", \"I will get through this\", \"It always passes\", \"Ride the wave\", think about happy memories:  , remind myself of what is important to me:   and self-compassion statements:    Step 3: People and social settings that provide distraction:   Name: Grant Phone:    Name: Edilson Phone:    Name:    Phone:     [employer], library, community center, support group (i.e. twelve-step) and work   Step 4: Remind myself of people and things that are important to me and worth living for:  My family, friends, job,       Step 5: When I am in crisis, I can ask these people to help me use my safety plan:   Name: Grant Phone:    Name: Edilson Phone:      Step 6: Making the environment safe:     remove alcohol, remove drugs, secure medications:  , dispose of old medications , remove access to firearms:  , remove things I could use to hurt myself:   and be around others  Step 7: Professionals or agencies I can contact during a crisis:    Suicide Prevention Lifeline: Call or Text 108   Local Crisis Services: [facility] [phone]    Call 911 or go to my nearest emergency department.   I helped develop this safety plan and agree to use it when needed.  I have been given a copy of this plan.      Client signature ______________N/A virtual session___________________________________________________  Today s date:  2/13/2023  Completed by Provider Name/ Credentials:  Brianne Sultana [facility]  February 13, 2023  Adapted from Safety Plan Template 2008 Oliva Myers and Donal Valverde is reprinted with the express permission of the authors.  No portion of the Safety Plan Template may be reproduced without " the express, written permission.  You can contact the authors at [email or web address] or [email or web address].                Brianne Sultana, LICSW, MSW, LICSW      Answers for HPI/ROS submitted by the patient on 3/28/2023  If you checked off any problems, how difficult have these problems made it for you to do your work, take care of things at home, or get along with other people?: Somewhat difficult  PHQ9 TOTAL SCORE: 4  SHARON 7 TOTAL SCORE: 2

## 2023-04-25 DIAGNOSIS — J31.0 CHRONIC RHINITIS: ICD-10-CM

## 2023-04-26 RX ORDER — FLUTICASONE PROPIONATE 50 MCG
SPRAY, SUSPENSION (ML) NASAL
Qty: 16 ML | Refills: 4 | OUTPATIENT
Start: 2023-04-26

## 2023-05-15 ENCOUNTER — OFFICE VISIT (OUTPATIENT)
Dept: FAMILY MEDICINE | Facility: CLINIC | Age: 53
End: 2023-05-15
Payer: COMMERCIAL

## 2023-05-15 VITALS
DIASTOLIC BLOOD PRESSURE: 76 MMHG | WEIGHT: 215 LBS | TEMPERATURE: 98.4 F | BODY MASS INDEX: 30.78 KG/M2 | RESPIRATION RATE: 24 BRPM | SYSTOLIC BLOOD PRESSURE: 149 MMHG | HEART RATE: 85 BPM | OXYGEN SATURATION: 94 % | HEIGHT: 70 IN

## 2023-05-15 DIAGNOSIS — I10 ESSENTIAL HYPERTENSION, BENIGN: ICD-10-CM

## 2023-05-15 DIAGNOSIS — E11.9 TYPE 2 DIABETES MELLITUS WITHOUT COMPLICATION, WITHOUT LONG-TERM CURRENT USE OF INSULIN (H): ICD-10-CM

## 2023-05-15 DIAGNOSIS — Z00.00 HEALTHCARE MAINTENANCE: Primary | ICD-10-CM

## 2023-05-15 DIAGNOSIS — E78.5 HYPERLIPIDEMIA, UNSPECIFIED HYPERLIPIDEMIA TYPE: ICD-10-CM

## 2023-05-15 DIAGNOSIS — Z13.220 SCREENING FOR HYPERLIPIDEMIA: ICD-10-CM

## 2023-05-15 DIAGNOSIS — Z12.11 SCREEN FOR COLON CANCER: ICD-10-CM

## 2023-05-15 LAB — HBA1C MFR BLD: 7.7 % (ref 0–5.6)

## 2023-05-15 PROCEDURE — 99396 PREV VISIT EST AGE 40-64: CPT | Mod: 25 | Performed by: FAMILY MEDICINE

## 2023-05-15 PROCEDURE — 82570 ASSAY OF URINE CREATININE: CPT | Performed by: FAMILY MEDICINE

## 2023-05-15 PROCEDURE — 82043 UR ALBUMIN QUANTITATIVE: CPT | Performed by: FAMILY MEDICINE

## 2023-05-15 PROCEDURE — 90746 HEPB VACCINE 3 DOSE ADULT IM: CPT | Performed by: FAMILY MEDICINE

## 2023-05-15 PROCEDURE — 90471 IMMUNIZATION ADMIN: CPT | Performed by: FAMILY MEDICINE

## 2023-05-15 PROCEDURE — 99213 OFFICE O/P EST LOW 20 MIN: CPT | Mod: 25 | Performed by: FAMILY MEDICINE

## 2023-05-15 PROCEDURE — 83036 HEMOGLOBIN GLYCOSYLATED A1C: CPT | Performed by: FAMILY MEDICINE

## 2023-05-15 PROCEDURE — 36415 COLL VENOUS BLD VENIPUNCTURE: CPT | Performed by: FAMILY MEDICINE

## 2023-05-15 RX ORDER — AMLODIPINE BESYLATE 10 MG/1
10 TABLET ORAL DAILY
Qty: 90 TABLET | Refills: 3 | Status: SHIPPED | OUTPATIENT
Start: 2023-05-15 | End: 2024-04-30

## 2023-05-15 RX ORDER — ATORVASTATIN CALCIUM 20 MG/1
TABLET, FILM COATED ORAL
Qty: 90 TABLET | Refills: 0 | Status: SHIPPED | OUTPATIENT
Start: 2023-05-15 | End: 2024-03-18

## 2023-05-15 RX ORDER — AMLODIPINE BESYLATE 5 MG/1
TABLET ORAL
Qty: 90 TABLET | Refills: 0 | Status: CANCELLED | OUTPATIENT
Start: 2023-05-15

## 2023-05-15 RX ORDER — DULAGLUTIDE 0.75 MG/.5ML
0.75 INJECTION, SOLUTION SUBCUTANEOUS
Qty: 2 ML | Refills: 3 | Status: SHIPPED | OUTPATIENT
Start: 2023-05-15 | End: 2024-04-30

## 2023-05-15 ASSESSMENT — ENCOUNTER SYMPTOMS
EYE PAIN: 0
COUGH: 0
ARTHRALGIAS: 0
JOINT SWELLING: 0
WEAKNESS: 0
PALPITATIONS: 0
NAUSEA: 0
HEADACHES: 0
FREQUENCY: 0
HEMATOCHEZIA: 0
NERVOUS/ANXIOUS: 0
CONSTIPATION: 0
CHILLS: 0
FEVER: 0
SHORTNESS OF BREATH: 0
PARESTHESIAS: 0
SORE THROAT: 0
DYSURIA: 0
HEARTBURN: 0
ABDOMINAL PAIN: 0
DIZZINESS: 0
MYALGIAS: 0
DIARRHEA: 0
HEMATURIA: 0

## 2023-05-15 NOTE — ASSESSMENT & PLAN NOTE
DMII  borderline control  Hemoglobin A1C   Date Value Ref Range Status   05/15/2023 7.7 (H) 0.0 - 5.6 % Final     Comment:     Normal <5.7%   Prediabetes 5.7-6.4%    Diabetes 6.5% or higher     Note: Adopted from ADA consensus guidelines.      Diabetes meds:  Metformin 1000 twice daily      Checking blood sugars? No  none      Additional diabetes objectives reviewed   Statin medication (>39, 10 year risk > 7.5%) Yes  Blood pressure goal No  BP Readings from Last 6 Encounters:   05/15/23 (!) 149/76   03/02/23 134/74   10/04/22 123/79   05/03/22 106/73   04/16/21 139/89   03/15/20 106/75      Lab Results   Component Value Date    MICROALBUR 1.42 05/03/2022     Ace/ARB if indicated-Yes    Yearly dilated retina evaluation -No: Did not discuss    Plan: Add GLP-1 agonist, Trulicity  Discussed potential side effects including pancreatitis.  Patient does have a history of alcohol abuse but has been abstinent now for 2 months.  Voiced understanding of this risk.  Follow-up: 2 to 3 months       No

## 2023-05-15 NOTE — PROGRESS NOTES
Adult Male Physical  A/P  Type 2 diabetes mellitus without complication, without long-term current use of insulin (H)  DMII  borderline control  Hemoglobin A1C   Date Value Ref Range Status   05/15/2023 7.7 (H) 0.0 - 5.6 % Final     Comment:     Normal <5.7%   Prediabetes 5.7-6.4%    Diabetes 6.5% or higher     Note: Adopted from ADA consensus guidelines.      Diabetes meds:  Metformin 1000 twice daily      Checking blood sugars? No  none      Additional diabetes objectives reviewed   Statin medication (>39, 10 year risk > 7.5%) Yes  Blood pressure goal No  BP Readings from Last 6 Encounters:   05/15/23 (!) 149/76   03/02/23 134/74   10/04/22 123/79   05/03/22 106/73   04/16/21 139/89   03/15/20 106/75      Lab Results   Component Value Date    MICROALBUR 1.42 05/03/2022     Ace/ARB if indicated-Yes    Yearly dilated retina evaluation -No: Did not discuss    Plan: Add GLP-1 agonist, Trulicity  Discussed potential side effects including pancreatitis.  Patient does have a history of alcohol abuse but has been abstinent now for 2 months.  Voiced understanding of this risk.  Follow-up: 2 to 3 months        Hyperlipidemia, unspecified hyperlipidemia type  Atorvastatin 20, lipids checked in March    Healthcare maintenance  Discussed options including colonoscopy.  This was ordered.  Hepatitis B vaccine given    Essential hypertension, benign  Borderline control, increase amlodipine to 10 mg from 5 mg.  Continue hydrochlorothiazide and losartan 100           HPI  Current Concerns/ Questions  Concerned about his elevated A1c today.  Indicates that he is doing what he can with diet, no longer drinking alcohol, thinks that we should add medication.  No other questions.  PFSH:  Current medications reviewed as follows:  amoxicillin-clavulanate (AUGMENTIN) 875-125 MG tablet, Take 1 tablet by mouth 2 times daily  citalopram (CELEXA) 20 MG tablet, [CITALOPRAM (CELEXA) 20 MG TABLET] TAKE 1 TABLET BY MOUTH EVERY DAY  losartan  (COZAAR) 100 MG tablet, Take 1 tablet (100 mg) by mouth daily  metFORMIN (GLUCOPHAGE-XR) 500 MG 24 hr tablet, Take 2 tablets (1,000 mg) by mouth 2 times daily (with meals)  naltrexone (DEPADE/REVIA) 50 MG tablet, Take 1 tablet (50 mg) by mouth daily  sertraline (ZOLOFT) 50 MG tablet, Take 1 tablet (50 mg) by mouth daily  citalopram (CELEXA) 40 MG tablet, Take 1 tablet (40 mg) by mouth daily  fluticasone (FLONASE) 50 MCG/ACT nasal spray, Spray 1 spray into both nostrils daily  fluticasone propionate (FLONASE) 50 mcg/actuation nasal spray, [FLUTICASONE PROPIONATE (FLONASE) 50 MCG/ACTUATION NASAL SPRAY] SPRAY 2 SPRAYS INTO EACH NOSTRIL EVERY DAY  hydroCHLOROthiazide (HYDRODIURIL) 25 MG tablet, [HYDROCHLOROTHIAZIDE (HYDRODIURIL) 25 MG TABLET] Take 1 tablet (25 mg total) by mouth daily.    No current facility-administered medications on file prior to visit.     Patient Active Problem List   Diagnosis     Obesity     Essential hypertension, benign     Eczema     Acanthosis Nigricans     Generalized anxiety disorder     Mild episode of recurrent major depressive disorder (H)     Bereavement, uncomplicated     Type 2 diabetes mellitus without complication, without long-term current use of insulin (H)     Pure hypercholesterolemia     Healthcare maintenance     Chronic rhinitis     Acute sinusitis with symptoms > 10 days     Cough due to ACE inhibitor     Hyperlipidemia, unspecified hyperlipidemia type     Eczema     Hypertension     Moderate alcohol use disorder (H)     Panic disorder without agoraphobia     Past Medical History:   Diagnosis Date     Depressive disorder [id number]     Diabetes (H) 67236574     Hypertension [id number]     Past Surgical History:   Procedure Laterality Date     [facility] OPEN FIXATN PROX END/NECK FEMUR FX      Description: Open Fracture Reduction;  Recorded: 11/21/2008;  Comments: distal radius and ulna     Family History   Problem Relation Age of Onset     Depression Mother      Other Cancer Mother   "    Substance Abuse Father      Diabetes Father      Depression Sister      Diabetes Paternal Grandmother      History   Smoking Status     Never   Smokeless Tobacco     Never       Social History     Social History Narrative     Not on file     Immunization History   Administered Date(s) Administered     COVID-19 Bivalent 12+ (Pfizer) 10/04/2022     COVID-19 MONOVALENT 12+ (Pfizer) 04/20/2021, 05/11/2021     DT (PEDS <7y) 01/05/2006     Influenza Vaccine 50-64 or 18-64 w/egg allergy (Flublok) 10/04/2022     Influenza Vaccine >6 months (Alfuria,Fluzone) 10/13/2015, 10/16/2019     Pneumococcal 20 valent Conjugate (Prevnar 20) 10/04/2022     TD,PF 7+ (Tenivac) 01/27/2017     TDAP (Adacel,Boostrix) 03/29/2018     Td,adult,historic,unspecified 01/05/2006     Body mass index is 30.85 kg/m .        Objective  Physical Exam  Vitals:    05/15/23 1611   BP: (!) 149/76   BP Location: Right arm   Patient Position: Sitting   Cuff Size: Adult Large   Pulse: 85   Resp: 24   Temp: 98.4  F (36.9  C)   SpO2: 94%   Weight: 97.5 kg (215 lb)   Height: 1.778 m (5' 10\")       Gen- alert and oriented x3, no acute distress  HEENT- Normocephalic atraumatic   pupils equal and reactive, EOMI.    TMs visualized and normal, ear canals normal.    Mouth moist with normal mucosa no ulceration, dentition normal or in good repair.  Neck- supple, no adenopathy or thyromegaly  Chest- Normal chest wall apperance, normal inspiration and expiration.  Clear to asculation.  CV- Regular rate and rhythm, normal tones, no murmus, gallops or rubs.  t.    Ext- Atraumatic,  No synovial thickening. Good perfusion, no edema. Periph pulses detected  Skin- warm and dry, no rash  Neuro- Cranial nerves grossly intact.  Normal gait, normal strength  Diagnostics  Results for orders placed or performed in visit on 05/15/23   Hemoglobin A1c     Status: Abnormal   Result Value Ref Range    Hemoglobin A1C 7.7 (H) 0.0 - 5.6 %                     Answers for HPI/ROS submitted " by the patient on 5/15/2023  Frequency of exercise:: 2-3 days/week  Getting at least 3 servings of Calcium per day:: Yes  Diet:: Low salt, Diabetic  Taking medications regularly:: Yes  Medication side effects:: None  Bi-annual eye exam:: Yes  Dental care twice a year:: Yes  Sleep apnea or symptoms of sleep apnea:: None  abdominal pain: No  Blood in stool: No  Blood in urine: No  chest pain: No  chills: No  congestion: No  constipation: No  cough: No  diarrhea: No  dizziness: No  ear pain: No  eye pain: No  nervous/anxious: No  fever: No  frequency: No  genital sores: Yes  headaches: No  hearing loss: No  heartburn: No  arthralgias: No  joint swelling: No  peripheral edema: No  mood changes: Yes  myalgias: No  nausea: No  dysuria: No  palpitations: No  Skin sensation changes: No  sore throat: No  urgency: No  rash: No  shortness of breath: No  visual disturbance: No  weakness: No  impotence: No  penile discharge: No  Additional concerns today:: No  Duration of exercise:: 15-30 minutes

## 2023-05-15 NOTE — ASSESSMENT & PLAN NOTE
Borderline control, increase amlodipine to 10 mg from 5 mg.  Continue hydrochlorothiazide and losartan 100

## 2023-05-17 LAB
CREAT UR-MCNC: 124 MG/DL
MICROALBUMIN UR-MCNC: 16.7 MG/L
MICROALBUMIN/CREAT UR: 13.47 MG/G CR (ref 0–17)

## 2023-06-20 DIAGNOSIS — I10 PRIMARY HYPERTENSION: Primary | ICD-10-CM

## 2023-06-20 DIAGNOSIS — E11.9 TYPE 2 DIABETES MELLITUS WITHOUT COMPLICATION, WITHOUT LONG-TERM CURRENT USE OF INSULIN (H): ICD-10-CM

## 2023-06-21 RX ORDER — METFORMIN HCL 500 MG
TABLET, EXTENDED RELEASE 24 HR ORAL
Qty: 360 TABLET | Refills: 2 | Status: SHIPPED | OUTPATIENT
Start: 2023-06-21 | End: 2024-04-01

## 2023-06-21 RX ORDER — LOSARTAN POTASSIUM 100 MG/1
TABLET ORAL
Qty: 90 TABLET | Refills: 2 | Status: SHIPPED | OUTPATIENT
Start: 2023-06-21 | End: 2024-04-01

## 2023-06-21 NOTE — TELEPHONE ENCOUNTER
"Routing refill request to provider for review/approval because:  A break in medication  B/P out of range    Last Written Prescription Date:    losartan (COZAAR) 100 MG tablet 90 tablet 3 5/3/2022  No     metFORMIN (GLUCOPHAGE-XR) 500 MG 24 hr tablet 360 tablet 3 5/3/2022       Last office visit provider:  5/15/23     Requested Prescriptions   Pending Prescriptions Disp Refills     losartan (COZAAR) 100 MG tablet [Pharmacy Med Name: LOSARTAN POTASSIUM 100 MG TAB] 90 tablet 2     Sig: TAKE 1 TABLET BY MOUTH EVERY DAY       Angiotensin-II Receptors Failed - 6/20/2023 12:39 AM        Failed - Last blood pressure under 140/90 in past 12 months     BP Readings from Last 3 Encounters:   05/15/23 (!) 149/76   03/02/23 134/74   10/04/22 123/79                 Passed - Recent (12 mo) or future (30 days) visit within the authorizing provider's specialty     Patient has had an office visit with the authorizing provider or a provider within the authorizing providers department within the previous 12 mos or has a future within next 30 days. See \"Patient Info\" tab in inbasket, or \"Choose Columns\" in Meds & Orders section of the refill encounter.              Passed - Medication is active on med list        Passed - Patient is age 18 or older        Passed - Normal serum creatinine on file in past 12 months     Recent Labs   Lab Test 03/02/23  1603   CR 0.72       Ok to refill medication if creatinine is low          Passed - Normal serum potassium on file in past 12 months     Recent Labs   Lab Test 03/02/23  1603   POTASSIUM 3.9                       metFORMIN (GLUCOPHAGE XR) 500 MG 24 hr tablet [Pharmacy Med Name: METFORMIN HCL  MG TABLET] 360 tablet 2     Sig: TAKE 2 TABLETS BY MOUTH 2 TIMES DAILY WITH MEALS       Biguanide Agents Passed - 6/20/2023 12:39 AM        Passed - Patient is age 10 or older        Passed - Patient has documented A1c within the specified period of time.     If HgbA1C is 8 or greater, it needs to " "be on file within the past 3 months.  If less than 8, must be on file within the past 6 months.     Recent Labs   Lab Test 05/15/23  1618   A1C 7.7*             Passed - Patient's CR is NOT>1.4 OR Patient's EGFR is NOT<45 within past 12 mos.     Recent Labs   Lab Test 03/02/23  1603 05/03/22  1019 04/16/21  1457   GFRESTIMATED >90   < > >60   GFRESTBLACK  --   --  >60    < > = values in this interval not displayed.       Recent Labs   Lab Test 03/02/23  1603   CR 0.72             Passed - Patient does NOT have a diagnosis of CHF.        Passed - Medication is active on med list        Passed - Recent (6 mo) or future (30 days) visit within the authorizing provider's specialty     Patient had office visit in the last 6 months or has a visit in the next 30 days with authorizing provider or within the authorizing provider's specialty.  See \"Patient Info\" tab in inbasket, or \"Choose Columns\" in Meds & Orders section of the refill encounter.                 More Mata RN 06/20/23 9:58 PM  "

## 2023-11-25 ENCOUNTER — HEALTH MAINTENANCE LETTER (OUTPATIENT)
Age: 53
End: 2023-11-25

## 2024-02-20 DIAGNOSIS — F41.0 PANIC DISORDER WITHOUT AGORAPHOBIA: ICD-10-CM

## 2024-02-20 RX ORDER — CITALOPRAM HYDROBROMIDE 40 MG/1
40 TABLET ORAL DAILY
Qty: 90 TABLET | Refills: 3 | Status: SHIPPED | OUTPATIENT
Start: 2024-02-20

## 2024-03-18 ENCOUNTER — MYC REFILL (OUTPATIENT)
Dept: FAMILY MEDICINE | Facility: CLINIC | Age: 54
End: 2024-03-18
Payer: COMMERCIAL

## 2024-03-18 DIAGNOSIS — J01.90 ACUTE SINUSITIS WITH SYMPTOMS > 10 DAYS: ICD-10-CM

## 2024-03-18 DIAGNOSIS — E78.5 HYPERLIPIDEMIA, UNSPECIFIED HYPERLIPIDEMIA TYPE: ICD-10-CM

## 2024-03-19 RX ORDER — ATORVASTATIN CALCIUM 20 MG/1
TABLET, FILM COATED ORAL
Qty: 90 TABLET | Refills: 3 | Status: SHIPPED | OUTPATIENT
Start: 2024-03-19

## 2024-04-01 DIAGNOSIS — I10 PRIMARY HYPERTENSION: ICD-10-CM

## 2024-04-01 DIAGNOSIS — E11.9 TYPE 2 DIABETES MELLITUS WITHOUT COMPLICATION, WITHOUT LONG-TERM CURRENT USE OF INSULIN (H): ICD-10-CM

## 2024-04-01 RX ORDER — LOSARTAN POTASSIUM 100 MG/1
TABLET ORAL
Qty: 90 TABLET | Refills: 2 | Status: SHIPPED | OUTPATIENT
Start: 2024-04-01

## 2024-04-01 RX ORDER — METFORMIN HCL 500 MG
1000 TABLET, EXTENDED RELEASE 24 HR ORAL 2 TIMES DAILY WITH MEALS
Qty: 360 TABLET | Refills: 2 | Status: SHIPPED | OUTPATIENT
Start: 2024-04-01

## 2024-04-30 ENCOUNTER — OFFICE VISIT (OUTPATIENT)
Dept: FAMILY MEDICINE | Facility: CLINIC | Age: 54
End: 2024-04-30
Payer: COMMERCIAL

## 2024-04-30 ENCOUNTER — MYC REFILL (OUTPATIENT)
Dept: FAMILY MEDICINE | Facility: CLINIC | Age: 54
End: 2024-04-30

## 2024-04-30 VITALS
DIASTOLIC BLOOD PRESSURE: 78 MMHG | OXYGEN SATURATION: 95 % | RESPIRATION RATE: 20 BRPM | HEART RATE: 94 BPM | TEMPERATURE: 98.4 F | WEIGHT: 221 LBS | HEIGHT: 70 IN | SYSTOLIC BLOOD PRESSURE: 146 MMHG | BODY MASS INDEX: 31.64 KG/M2

## 2024-04-30 DIAGNOSIS — F41.0 PANIC DISORDER WITHOUT AGORAPHOBIA: ICD-10-CM

## 2024-04-30 DIAGNOSIS — E11.9 TYPE 2 DIABETES MELLITUS WITHOUT COMPLICATION, WITHOUT LONG-TERM CURRENT USE OF INSULIN (H): ICD-10-CM

## 2024-04-30 DIAGNOSIS — I10 ESSENTIAL HYPERTENSION, BENIGN: ICD-10-CM

## 2024-04-30 DIAGNOSIS — I10 PRIMARY HYPERTENSION: ICD-10-CM

## 2024-04-30 DIAGNOSIS — Z00.00 HEALTHCARE MAINTENANCE: Primary | ICD-10-CM

## 2024-04-30 DIAGNOSIS — E78.00 PURE HYPERCHOLESTEROLEMIA: ICD-10-CM

## 2024-04-30 DIAGNOSIS — F41.1 GENERALIZED ANXIETY DISORDER: ICD-10-CM

## 2024-04-30 DIAGNOSIS — F10.20 MODERATE ALCOHOL USE DISORDER (H): ICD-10-CM

## 2024-04-30 DIAGNOSIS — F33.0 MILD EPISODE OF RECURRENT MAJOR DEPRESSIVE DISORDER (H): ICD-10-CM

## 2024-04-30 DIAGNOSIS — Z12.11 SCREEN FOR COLON CANCER: ICD-10-CM

## 2024-04-30 LAB — HBA1C MFR BLD: 7.6 % (ref 0–5.6)

## 2024-04-30 PROCEDURE — 80048 BASIC METABOLIC PNL TOTAL CA: CPT | Performed by: FAMILY MEDICINE

## 2024-04-30 PROCEDURE — 90480 ADMN SARSCOV2 VAC 1/ONLY CMP: CPT | Performed by: FAMILY MEDICINE

## 2024-04-30 PROCEDURE — 91320 SARSCV2 VAC 30MCG TRS-SUC IM: CPT | Performed by: FAMILY MEDICINE

## 2024-04-30 PROCEDURE — 80061 LIPID PANEL: CPT | Performed by: FAMILY MEDICINE

## 2024-04-30 PROCEDURE — 36415 COLL VENOUS BLD VENIPUNCTURE: CPT | Performed by: FAMILY MEDICINE

## 2024-04-30 PROCEDURE — 84460 ALANINE AMINO (ALT) (SGPT): CPT | Performed by: FAMILY MEDICINE

## 2024-04-30 PROCEDURE — 99396 PREV VISIT EST AGE 40-64: CPT | Mod: 25 | Performed by: FAMILY MEDICINE

## 2024-04-30 PROCEDURE — 99207 PR FOOT EXAM NO CHARGE: CPT | Performed by: FAMILY MEDICINE

## 2024-04-30 PROCEDURE — 90746 HEPB VACCINE 3 DOSE ADULT IM: CPT | Performed by: FAMILY MEDICINE

## 2024-04-30 PROCEDURE — 82043 UR ALBUMIN QUANTITATIVE: CPT | Performed by: FAMILY MEDICINE

## 2024-04-30 PROCEDURE — 90472 IMMUNIZATION ADMIN EACH ADD: CPT | Performed by: FAMILY MEDICINE

## 2024-04-30 PROCEDURE — 99214 OFFICE O/P EST MOD 30 MIN: CPT | Mod: 25 | Performed by: FAMILY MEDICINE

## 2024-04-30 PROCEDURE — 90750 HZV VACC RECOMBINANT IM: CPT | Performed by: FAMILY MEDICINE

## 2024-04-30 PROCEDURE — 82570 ASSAY OF URINE CREATININE: CPT | Performed by: FAMILY MEDICINE

## 2024-04-30 PROCEDURE — 83036 HEMOGLOBIN GLYCOSYLATED A1C: CPT | Performed by: FAMILY MEDICINE

## 2024-04-30 PROCEDURE — 90471 IMMUNIZATION ADMIN: CPT | Performed by: FAMILY MEDICINE

## 2024-04-30 RX ORDER — HYDROCHLOROTHIAZIDE 25 MG/1
25 TABLET ORAL DAILY
Qty: 90 TABLET | Refills: 3 | Status: SHIPPED | OUTPATIENT
Start: 2024-04-30

## 2024-04-30 RX ORDER — AMLODIPINE BESYLATE 10 MG/1
10 TABLET ORAL DAILY
Qty: 90 TABLET | Refills: 3 | Status: SHIPPED | OUTPATIENT
Start: 2024-04-30

## 2024-04-30 RX ORDER — DULAGLUTIDE 0.75 MG/.5ML
0.75 INJECTION, SOLUTION SUBCUTANEOUS
Qty: 6 ML | Refills: 3 | Status: SHIPPED | OUTPATIENT
Start: 2024-04-30

## 2024-04-30 SDOH — HEALTH STABILITY: PHYSICAL HEALTH: ON AVERAGE, HOW MANY DAYS PER WEEK DO YOU ENGAGE IN MODERATE TO STRENUOUS EXERCISE (LIKE A BRISK WALK)?: 3 DAYS

## 2024-04-30 SDOH — HEALTH STABILITY: PHYSICAL HEALTH: ON AVERAGE, HOW MANY MINUTES DO YOU ENGAGE IN EXERCISE AT THIS LEVEL?: 30 MIN

## 2024-04-30 ASSESSMENT — COLUMBIA-SUICIDE SEVERITY RATING SCALE - C-SSRS
6. HAVE YOU EVER DONE ANYTHING, STARTED TO DO ANYTHING, OR PREPARED TO DO ANYTHING TO END YOUR LIFE?: NO
1. WITHIN THE PAST MONTH, HAVE YOU WISHED YOU WERE DEAD OR WISHED YOU COULD GO TO SLEEP AND NOT WAKE UP?: YES
6. HAVE YOU EVER DONE ANYTHING, STARTED TO DO ANYTHING, OR PREPARED TO DO ANYTHING TO END YOUR LIFE?: NO
2. IN THE PAST MONTH, HAVE YOU ACTUALLY HAD ANY THOUGHTS OF KILLING YOURSELF?: NO
2. IN THE PAST MONTH, HAVE YOU ACTUALLY HAD ANY THOUGHTS OF KILLING YOURSELF?: NO
1. WITHIN THE PAST MONTH, HAVE YOU WISHED YOU WERE DEAD OR WISHED YOU COULD GO TO SLEEP AND NOT WAKE UP?: YES

## 2024-04-30 ASSESSMENT — PATIENT HEALTH QUESTIONNAIRE - PHQ9
SUM OF ALL RESPONSES TO PHQ QUESTIONS 1-9: 11
10. IF YOU CHECKED OFF ANY PROBLEMS, HOW DIFFICULT HAVE THESE PROBLEMS MADE IT FOR YOU TO DO YOUR WORK, TAKE CARE OF THINGS AT HOME, OR GET ALONG WITH OTHER PEOPLE: VERY DIFFICULT
SUM OF ALL RESPONSES TO PHQ QUESTIONS 1-9: 11

## 2024-04-30 ASSESSMENT — SOCIAL DETERMINANTS OF HEALTH (SDOH): HOW OFTEN DO YOU GET TOGETHER WITH FRIENDS OR RELATIVES?: THREE TIMES A WEEK

## 2024-04-30 NOTE — PROGRESS NOTES
Adult Male Physical  A/P  Essential hypertension, benign  Asked him to start checking blood pressures at home, borderline elevated today.  Currently on losartan 100, hydrochlorothiazide 25 and amlodipine 10.  Check BMP.  Follow-up 2 months    Generalized anxiety disorder  Referral to therapy, continue citalopram.    Mild episode of recurrent major depressive disorder (H)  Moderate depression now, currently active, some suicidal ideation though passive, no active ideation currently, on citalopram which he thinks is effective  Referral to therapist    Depression Screening Follow Up              4/30/2024     6:27 PM   C-SSRS (Brief [place])   Within the last month, have you wished you were dead or wished you could go to sleep and not wake up? Yes   Within the last month, have you had any actual thoughts of killing yourself? No   Within the last month, have you ever done anything, started to do anything, or prepared to do anything to end your life? No         Follow Up Actions Taken  Crisis resource information provided in the After Visit Summary  Referred patient back to mental health provider    Discussed the following ways the patient can remain in a safe environment:  be around others       Type 2 diabetes mellitus without complication, without long-term current use of insulin (H)  A1c 7.6, start GLP-1.  Had ordered this last year, he never picked up.  Add to metformin 2000 daily, follow-up in 2 months.  Patient will seek ophthalmology follow-up.  On atorvastatin, blood pressure borderline on losartan check microalbumin, BMP lipid today.    Pure hypercholesterolemia  Atorvastatin, check lipid and ALT    Healthcare maintenance  Agreeable to vaccines including COVID, hep B #2 and Shingrix.  Referral for colonoscopy.  No need for STD screening by his account.    Moderate alcohol use disorder (H)  Most recent relapse in March, declines need for naltrexone.  AA meeting once a week, has a sponsor.         HPI  Current  Concerns/ Questions  53-year-old, here for physical exam.  No questions.  Describe recent history as above.  Admits to more problems with mental health lately.  Previously saw therapist, on citalopram which she is sure helped a great deal in the past.  Some passive suicidal ideation.  2 panic attacks per week.    Discussed options, will refer to psychiatry as well as a therapist.    PFSH:  Current medications reviewed as follows:  Current Outpatient Medications   Medication Sig Dispense Refill    amLODIPine (NORVASC) 10 MG tablet Take 1 tablet (10 mg) by mouth daily 90 tablet 3    atorvastatin (LIPITOR) 20 MG tablet TAKE 1 TABLET BY MOUTH EVERY DAY 90 tablet 3    citalopram (CELEXA) 40 MG tablet TAKE 1 TABLET BY MOUTH EVERY DAY 90 tablet 3    dulaglutide (TRULICITY) 0.75 MG/0.5ML pen Inject 0.75 mg Subcutaneous every 7 days 6 mL 3    hydrochlorothiazide (HYDRODIURIL) 25 MG tablet Take 1 tablet (25 mg) by mouth daily 90 tablet 3    losartan (COZAAR) 100 MG tablet TAKE 1 TABLET BY MOUTH EVERY DAY 90 tablet 2    metFORMIN (GLUCOPHAGE XR) 500 MG 24 hr tablet TAKE 2 TABLETS BY MOUTH TWICE A DAY WITH MEALS 360 tablet 2     No current facility-administered medications for this visit.      Patient Active Problem List   Diagnosis    Obesity    Essential hypertension, benign    Eczema    Acanthosis Nigricans    Generalized anxiety disorder    Mild episode of recurrent major depressive disorder (H24)    Bereavement, uncomplicated    Type 2 diabetes mellitus without complication, without long-term current use of insulin (H)    Pure hypercholesterolemia    Healthcare maintenance    Chronic rhinitis    Acute sinusitis with symptoms > 10 days    Cough due to ACE inhibitor    Hyperlipidemia, unspecified hyperlipidemia type    Eczema    Hypertension    Moderate alcohol use disorder (H)    Panic disorder without agoraphobia     Past Medical History:   Diagnosis Date    Depressive disorder [id number]    Diabetes (H) 59820306     "Hypertension [id number]     Past Surgical History:   Procedure Laterality Date    ZZC OPEN FIXATN PROX END/NECK FEMUR FX      Description: Open Fracture Reduction;  Recorded: 11/21/2008;  Comments: distal radius and ulna     Family History   Problem Relation Age of Onset    Depression Mother     Other Cancer Mother     Substance Abuse Father     Diabetes Father     Depression Sister     Diabetes Paternal Grandmother      History   Smoking Status    Never   Smokeless Tobacco    Never       Social History     Social History Narrative    Customer services    Single     Immunization History   Administered Date(s) Administered    COVID-19 12+ (2023-24) (Pfizer) 04/30/2024    COVID-19 Bivalent 12+ (Pfizer) 10/04/2022    COVID-19 MONOVALENT 12+ (Pfizer) 04/20/2021, 05/11/2021    DT (PEDS <7y) 01/05/2006    DTAP (<7y) 01/05/2006    Hepatitis B, Adult 05/15/2023, 04/30/2024    Influenza Vaccine 18-64 (Flublok) 10/04/2022    Influenza Vaccine >6 months,quad, PF 10/13/2015, 10/16/2019    Pneumococcal 20 valent Conjugate (Prevnar 20) 10/04/2022    TD,PF 7+ (Tenivac) 01/27/2017    TDAP (Adacel,Boostrix) 03/29/2018    Td (Adult), Adsorbed 01/05/2006, 01/27/2017    Td,adult,historic,unspecified 01/05/2006    Zoster recombinant adjuvanted (SHINGRIX) 04/30/2024     Body mass index is 31.57 kg/m .        Objective  Physical Exam  Vitals:    04/30/24 1548 04/30/24 1556   BP: (!) 162/78 (!) 146/78   Pulse: 94    Resp: 20    Temp: 98.4  F (36.9  C)    TempSrc: Tympanic    SpO2: 95%    Weight: 100.2 kg (221 lb)    Height: 1.782 m (5' 10.16\")        Gen- alert and oriented x3, no acute distress  HEENT- Normocephalic atraumatic   pupils equal and reactive, EOMI.    TMs visualized and normal, ear canals normal.    Mouth moist with normal mucosa no ulceration, dentition normal or in good repair.  Neck- supple, no adenopathy or thyromegaly  Chest- Normal chest wall apperance, normal inspiration and expiration.  Clear to asculation.  CV- Regular " rate and rhythm, normal tones, no murmus, gallops or rubs.  Abd-  Soft, nodistended, nontender.  Normal bowel sounds, no mass or organ enlargement.  Genitalia-  was not done  BIBI: was not done  Ext- Atraumatic,  No synovial thickening. Good perfusion, no edema. Periph pulses detected  Skin- warm and dry, no rash  Neuro- Cranial nerves grossly intact.  Normal gait, normal strength.  Reflexes symmetric.  Coordination intact.    Diagnostics  Results for orders placed or performed in visit on 04/30/24   Hemoglobin A1c     Status: Abnormal   Result Value Ref Range    Hemoglobin A1C 7.6 (H) 0.0 - 5.6 %

## 2024-04-30 NOTE — LETTER
My Depression Action Plan  Name: Grant Slaughter   Date of Birth 1970  Date: 4/30/2024    My doctor: Clifford Evans   My clinic: [facility] [address] [place] [id number] [phone]            GREEN    ZONE   Good Control    What it looks like:   Things are going generally well. You have normal ups and downs. You may even feel depressed from time to time, but bad moods usually last less than a day.   What you need to do:  Continue to care for yourself (see self care plan)  Check your depression survival kit and update it as needed  Follow your physician s recommendations including any medication.  Do not stop taking medication unless you consult with your physician first.             YELLOW         ZONE Getting Worse    What it looks like:   Depression is starting to interfere with your life.   It may be hard to get out of bed; you may be starting to isolate yourself from others.  Symptoms of depression are starting to last most all day and this has happened for several days.   You may have suicidal thoughts but they are not constant.   What you need to do:     Call your care team. Your response to treatment will improve if you keep your care team informed of your progress. Yellow periods are signs an adjustment may need to be made.     Continue your self-care.  Just get dressed and ready for the day.  Don't give yourself time to talk yourself out of it.    Talk to someone in your support network.    Open up your Depression Self-Care Plan/Wellness Kit.             RED    ZONE Medical Alert - Get Help    What it looks like:   Depression is seriously interfering with your life.   You may experience these or other symptoms: You can t get out of bed most days, can t work or engage in other necessary activities, you have trouble taking care of basic hygiene, or basic responsibilities, thoughts of suicide or death that will not go away, self-injurious behavior.      What you need to do:  Call your care team and request a same-day appointment. If they are not available (weekends or after hours) call your local crisis line, emergency room or 911.          Depression Self-Care Plan / Wellness Kit    Many people find that medication and therapy are helpful treatments for managing depression. In addition, making small changes to your everyday life can help to boost your mood and improve your wellbeing. Below are some tips for you to consider. Be sure to talk with your medical provider and/or behavioral health consultant if your symptoms are worsening or not improving.     Sleep   Sleep hygiene  means all of the habits that support good, restful sleep. It includes maintaining a consistent bedtime and wake time, using your bedroom only for sleeping or sex, and keeping the bedroom dark and free of distractions like a computer, smartphone, or television.     Develop a Healthy Routine  Maintain good hygiene. Get out of bed in the morning, make your bed, brush your teeth, take a shower, and get dressed. Don t spend too much time viewing media that makes you feel stressed. Find time to relax each day.    Exercise  Get some form of exercise every day. This will help reduce pain and release endorphins, the  feel good  chemicals in your brain. It can be as simple as just going for a walk or doing some gardening, anything that will get you moving.      Diet  Strive to eat healthy foods, including fruits and vegetables. Drink plenty of water. Avoid excessive sugar, caffeine, alcohol, and other mood-altering substances.     Stay Connected with Others  Stay in touch with friends and family members.    Manage Your Mood  Try deep breathing, massage therapy, biofeedback, or meditation. Take part in fun activities when you can. Try to find something to smile about each day.     Psychotherapy  Be open to working with a therapist if your provider recommends it.     Medication  Be sure to take your  medication as prescribed. Most anti-depressants need to be taken every day. It usually takes several weeks for medications to work. Not all medicines work for all people. It is important to follow-up with your provider to make sure you have a treatment plan that is working for you. Do not stop your medication abruptly without first discussing it with your provider.    Crisis Resources   These hotlines are for both adults and children. They and are open 24 hours a day, 7 days a week unless noted otherwise.    National Suicide Prevention Lifeline   988 or 4-502-801-AUCL (8795)    Crisis Text Line    www.crisistextline.org  Text HOME to 670240 from anywhere in the [place], anytime, about any type of crisis. A live, trained crisis counselor will receive the text and respond quickly.    [place] Lifeline for LGBTQ Youth  A national crisis intervention and suicide lifeline for LGBTQ youth under 25. Provides a safe place to talk without judgement. Call 1-792.368.3550; text START to 584585 or visit www.[employer].org to talk to a trained counselor.    For Psychiatric hospital crisis numbers, visit the [facility] website at:  https://[place].gov/dhs/people-we-serve/adults/health-care/mental-health/resources/crisis-contacts.jsp

## 2024-04-30 NOTE — ASSESSMENT & PLAN NOTE
A1c 7.6, start GLP-1.  Had ordered this last year, he never picked up.  Add to metformin 2000 daily, follow-up in 2 months.  Patient will seek ophthalmology follow-up.  On atorvastatin, blood pressure borderline on losartan check microalbumin, BMP lipid today.

## 2024-04-30 NOTE — ASSESSMENT & PLAN NOTE
Most recent relapse in March, declines need for naltrexone.  AA meeting once a week, has a sponsor.

## 2024-04-30 NOTE — ASSESSMENT & PLAN NOTE
Asked him to start checking blood pressures at home, borderline elevated today.  Currently on losartan 100, hydrochlorothiazide 25 and amlodipine 10.  Check BMP.  Follow-up 2 months

## 2024-04-30 NOTE — ASSESSMENT & PLAN NOTE
Moderate depression now, currently active, some suicidal ideation though passive, no active ideation currently, on citalopram which he thinks is effective  Referral to therapist    Depression Screening Follow Up              4/30/2024     6:27 PM   C-SSRS (Brief [place])   Within the last month, have you wished you were dead or wished you could go to sleep and not wake up? Yes   Within the last month, have you had any actual thoughts of killing yourself? No   Within the last month, have you ever done anything, started to do anything, or prepared to do anything to end your life? No         Follow Up Actions Taken  Crisis resource information provided in the After Visit Summary  Referred patient back to mental health provider    Discussed the following ways the patient can remain in a safe environment:  be around others

## 2024-04-30 NOTE — ASSESSMENT & PLAN NOTE
Agreeable to vaccines including COVID, hep B #2 and Shingrix.  Referral for colonoscopy.  No need for STD screening by his account.

## 2024-05-01 LAB
ALT SERPL W P-5'-P-CCNC: 42 U/L (ref 0–70)
ANION GAP SERPL CALCULATED.3IONS-SCNC: 12 MMOL/L (ref 7–15)
BUN SERPL-MCNC: 12.1 MG/DL (ref 6–20)
CALCIUM SERPL-MCNC: 9.6 MG/DL (ref 8.6–10)
CHLORIDE SERPL-SCNC: 100 MMOL/L (ref 98–107)
CHOLEST SERPL-MCNC: 130 MG/DL
CREAT SERPL-MCNC: 0.7 MG/DL (ref 0.67–1.17)
CREAT UR-MCNC: 71.9 MG/DL
DEPRECATED HCO3 PLAS-SCNC: 25 MMOL/L (ref 22–29)
EGFRCR SERPLBLD CKD-EPI 2021: >90 ML/MIN/1.73M2
FASTING STATUS PATIENT QL REPORTED: ABNORMAL
GLUCOSE SERPL-MCNC: 127 MG/DL (ref 70–99)
HDLC SERPL-MCNC: 33 MG/DL
LDLC SERPL CALC-MCNC: 64 MG/DL
MICROALBUMIN UR-MCNC: 16.2 MG/L
MICROALBUMIN/CREAT UR: 22.53 MG/G CR (ref 0–17)
NONHDLC SERPL-MCNC: 97 MG/DL
POTASSIUM SERPL-SCNC: 4.4 MMOL/L (ref 3.4–5.3)
SODIUM SERPL-SCNC: 137 MMOL/L (ref 135–145)
TRIGL SERPL-MCNC: 167 MG/DL

## 2024-05-01 RX ORDER — LOSARTAN POTASSIUM 100 MG/1
100 TABLET ORAL DAILY
Qty: 90 TABLET | Refills: 2 | OUTPATIENT
Start: 2024-05-01

## 2024-06-23 ENCOUNTER — MYC REFILL (OUTPATIENT)
Dept: FAMILY MEDICINE | Facility: CLINIC | Age: 54
End: 2024-06-23
Payer: COMMERCIAL

## 2024-06-23 DIAGNOSIS — I10 PRIMARY HYPERTENSION: ICD-10-CM

## 2024-06-24 RX ORDER — LOSARTAN POTASSIUM 100 MG/1
100 TABLET ORAL DAILY
Qty: 90 TABLET | Refills: 2 | OUTPATIENT
Start: 2024-06-24

## 2024-11-05 ASSESSMENT — ANXIETY QUESTIONNAIRES: GAD7 TOTAL SCORE: 11

## 2024-11-09 ENCOUNTER — HEALTH MAINTENANCE LETTER (OUTPATIENT)
Age: 54
End: 2024-11-09

## 2025-03-17 DIAGNOSIS — E78.5 HYPERLIPIDEMIA, UNSPECIFIED HYPERLIPIDEMIA TYPE: ICD-10-CM

## 2025-03-17 DIAGNOSIS — I10 PRIMARY HYPERTENSION: ICD-10-CM

## 2025-03-17 RX ORDER — LOSARTAN POTASSIUM 100 MG/1
TABLET ORAL
Qty: 90 TABLET | Refills: 2 | Status: SHIPPED | OUTPATIENT
Start: 2025-03-17

## 2025-03-17 RX ORDER — ATORVASTATIN CALCIUM 20 MG/1
TABLET, FILM COATED ORAL
Qty: 90 TABLET | Refills: 0 | Status: SHIPPED | OUTPATIENT
Start: 2025-03-17

## 2025-03-18 DIAGNOSIS — F41.0 PANIC DISORDER WITHOUT AGORAPHOBIA: ICD-10-CM

## 2025-03-18 RX ORDER — CITALOPRAM HYDROBROMIDE 40 MG/1
40 TABLET ORAL DAILY
Qty: 90 TABLET | Refills: 3 | Status: SHIPPED | OUTPATIENT
Start: 2025-03-18

## 2025-05-02 DIAGNOSIS — I10 ESSENTIAL HYPERTENSION, BENIGN: ICD-10-CM

## 2025-05-05 RX ORDER — AMLODIPINE BESYLATE 10 MG/1
10 TABLET ORAL DAILY
Qty: 90 TABLET | Refills: 3 | Status: SHIPPED | OUTPATIENT
Start: 2025-05-05

## 2025-05-10 ENCOUNTER — HEALTH MAINTENANCE LETTER (OUTPATIENT)
Age: 55
End: 2025-05-10

## 2025-05-31 ENCOUNTER — HEALTH MAINTENANCE LETTER (OUTPATIENT)
Age: 55
End: 2025-05-31

## 2025-06-13 DIAGNOSIS — E78.5 HYPERLIPIDEMIA, UNSPECIFIED HYPERLIPIDEMIA TYPE: ICD-10-CM

## 2025-06-16 RX ORDER — ATORVASTATIN CALCIUM 20 MG/1
20 TABLET, FILM COATED ORAL DAILY
Qty: 90 TABLET | Refills: 0 | Status: SHIPPED | OUTPATIENT
Start: 2025-06-16